# Patient Record
Sex: FEMALE | Race: WHITE | NOT HISPANIC OR LATINO | Employment: OTHER | URBAN - METROPOLITAN AREA
[De-identification: names, ages, dates, MRNs, and addresses within clinical notes are randomized per-mention and may not be internally consistent; named-entity substitution may affect disease eponyms.]

---

## 2017-01-04 ENCOUNTER — OUTPATIENT INFUSION SERVICES (OUTPATIENT)
Dept: ONCOLOGY | Facility: MEDICAL CENTER | Age: 77
End: 2017-01-04
Attending: INTERNAL MEDICINE
Payer: MEDICARE

## 2017-01-04 VITALS
DIASTOLIC BLOOD PRESSURE: 99 MMHG | OXYGEN SATURATION: 90 % | HEART RATE: 69 BPM | BODY MASS INDEX: 27.6 KG/M2 | WEIGHT: 171.74 LBS | SYSTOLIC BLOOD PRESSURE: 141 MMHG | HEIGHT: 66 IN | RESPIRATION RATE: 18 BRPM | TEMPERATURE: 97.8 F

## 2017-01-04 DIAGNOSIS — M05.742 RHEUMATOID ARTHRITIS INVOLVING BOTH HANDS WITH POSITIVE RHEUMATOID FACTOR (HCC): ICD-10-CM

## 2017-01-04 DIAGNOSIS — M05.741 RHEUMATOID ARTHRITIS INVOLVING BOTH HANDS WITH POSITIVE RHEUMATOID FACTOR (HCC): ICD-10-CM

## 2017-01-04 LAB
ALBUMIN SERPL BCP-MCNC: 4 G/DL (ref 3.2–4.9)
ALBUMIN/GLOB SERPL: 1.7 G/DL
ALP SERPL-CCNC: 130 U/L (ref 30–99)
ALT SERPL-CCNC: 26 U/L (ref 2–50)
ANION GAP SERPL CALC-SCNC: 8 MMOL/L (ref 0–11.9)
AST SERPL-CCNC: 22 U/L (ref 12–45)
BASOPHILS # BLD AUTO: 0.1 % (ref 0–1.8)
BASOPHILS # BLD: 0.01 K/UL (ref 0–0.12)
BILIRUB SERPL-MCNC: 0.5 MG/DL (ref 0.1–1.5)
BUN SERPL-MCNC: 14 MG/DL (ref 8–22)
CALCIUM SERPL-MCNC: 9.3 MG/DL (ref 8.5–10.5)
CHLORIDE SERPL-SCNC: 107 MMOL/L (ref 96–112)
CO2 SERPL-SCNC: 23 MMOL/L (ref 20–33)
CREAT SERPL-MCNC: 0.59 MG/DL (ref 0.5–1.4)
EOSINOPHIL # BLD AUTO: 0.11 K/UL (ref 0–0.51)
EOSINOPHIL NFR BLD: 1.6 % (ref 0–6.9)
ERYTHROCYTE [DISTWIDTH] IN BLOOD BY AUTOMATED COUNT: 49.1 FL (ref 35.9–50)
GFR SERPL CREATININE-BSD FRML MDRD: >60 ML/MIN/1.73 M 2
GLOBULIN SER CALC-MCNC: 2.3 G/DL (ref 1.9–3.5)
GLUCOSE SERPL-MCNC: 103 MG/DL (ref 65–99)
HCT VFR BLD AUTO: 45.6 % (ref 37–47)
HGB BLD-MCNC: 14.9 G/DL (ref 12–16)
LYMPHOCYTES # BLD AUTO: 1.33 K/UL (ref 1–4.8)
LYMPHOCYTES NFR BLD: 19.3 % (ref 22–41)
MCH RBC QN AUTO: 29.7 PG (ref 27–33)
MCHC RBC AUTO-ENTMCNC: 32.7 G/DL (ref 33.6–35)
MCV RBC AUTO: 90.8 FL (ref 81.4–97.8)
MONOCYTES # BLD AUTO: 0.61 K/UL (ref 0–0.85)
MONOCYTES NFR BLD AUTO: 8.8 % (ref 0–13.4)
NEUTROPHILS # BLD AUTO: 4.84 K/UL (ref 2–7.15)
NEUTROPHILS NFR BLD: 70.2 % (ref 44–72)
PLATELET # BLD AUTO: 330 K/UL (ref 164–446)
PMV BLD AUTO: 10.2 FL (ref 9–12.9)
POTASSIUM SERPL-SCNC: 3.9 MMOL/L (ref 3.6–5.5)
PROT SERPL-MCNC: 6.3 G/DL (ref 6–8.2)
RBC # BLD AUTO: 5.02 M/UL (ref 4.2–5.4)
SODIUM SERPL-SCNC: 138 MMOL/L (ref 135–145)
WBC # BLD AUTO: 6.9 K/UL (ref 4.8–10.8)

## 2017-01-04 PROCEDURE — 85025 COMPLETE CBC W/AUTO DIFF WBC: CPT

## 2017-01-04 PROCEDURE — 96413 CHEMO IV INFUSION 1 HR: CPT

## 2017-01-04 PROCEDURE — 700102 HCHG RX REV CODE 250 W/ 637 OVERRIDE(OP): Performed by: INTERNAL MEDICINE

## 2017-01-04 PROCEDURE — 700105 HCHG RX REV CODE 258: Performed by: INTERNAL MEDICINE

## 2017-01-04 PROCEDURE — 80053 COMPREHEN METABOLIC PANEL: CPT

## 2017-01-04 PROCEDURE — 96375 TX/PRO/DX INJ NEW DRUG ADDON: CPT

## 2017-01-04 PROCEDURE — A9270 NON-COVERED ITEM OR SERVICE: HCPCS | Performed by: INTERNAL MEDICINE

## 2017-01-04 PROCEDURE — 96415 CHEMO IV INFUSION ADDL HR: CPT

## 2017-01-04 PROCEDURE — 700111 HCHG RX REV CODE 636 W/ 250 OVERRIDE (IP): Performed by: INTERNAL MEDICINE

## 2017-01-04 RX ORDER — DIPHENHYDRAMINE HCL 25 MG
25 TABLET ORAL ONCE
Status: COMPLETED | OUTPATIENT
Start: 2017-01-04 | End: 2017-01-04

## 2017-01-04 RX ORDER — METHYLPREDNISOLONE SODIUM SUCCINATE 125 MG/2ML
100 INJECTION, POWDER, LYOPHILIZED, FOR SOLUTION INTRAMUSCULAR; INTRAVENOUS ONCE
Status: COMPLETED | OUTPATIENT
Start: 2017-01-04 | End: 2017-01-04

## 2017-01-04 RX ORDER — CHLORAL HYDRATE 500 MG
1000 CAPSULE ORAL
COMMUNITY
End: 2021-12-06

## 2017-01-04 RX ORDER — FOLIC ACID 1 MG/1
1 TABLET ORAL DAILY
COMMUNITY
End: 2017-03-28 | Stop reason: SDUPTHER

## 2017-01-04 RX ORDER — ACETAMINOPHEN 325 MG/1
650 TABLET ORAL ONCE
Status: COMPLETED | OUTPATIENT
Start: 2017-01-04 | End: 2017-01-04

## 2017-01-04 RX ADMIN — ACETAMINOPHEN 650 MG: 325 TABLET, FILM COATED ORAL at 09:14

## 2017-01-04 RX ADMIN — RITUXIMAB 1000 MG: 10 INJECTION, SOLUTION INTRAVENOUS at 09:50

## 2017-01-04 RX ADMIN — DIPHENHYDRAMINE HCL 25 MG: 25 TABLET ORAL at 09:14

## 2017-01-04 RX ADMIN — METHYLPREDNISOLONE SODIUM SUCCINATE 100 MG: 125 INJECTION, POWDER, FOR SOLUTION INTRAMUSCULAR; INTRAVENOUS at 09:14

## 2017-01-04 NOTE — PROGRESS NOTES
Chemotherapy Verification - SECONDARY RN       Height = 166.4 cm  Weight = 77.9 kg  BSA = 1.9 m2       Medication: Rituxan  Dose: set dose for RA  Calculated Dose: 1000 mg                             (In mg/m2, AUC, mg/kg)       I confirm that this process was performed independently.

## 2017-01-04 NOTE — PROGRESS NOTES
"Pharmacy chemotherapy Verification:    Gaye Lizabethzachary Antoine    Protocol: Rituximab     Rituximab 1000 mg IV Days 1 and 15  [id number]  Jag HITCHCOCK, et al. N Engl J Med 2004; 350:5019-7282. Efficacy of B-Cell-Targeted Therapy with Rituximab in Patients with Rheumatoid Arthritis    Dx: RA       Allergies: Sulfa drugs      /99 mmHg  Pulse 69  Temp(Src) 36.6 °C (97.8 °F)  Resp 18  Ht 1.664 m (5' 5.5\")  Wt 77.9 kg (171 lb 11.8 oz)  BMI 28.13 kg/m2  SpO2 90%  LMP 03/01/2005    Body surface area is 1.90 meters squared.    Labs 1/4/17  ANC~ 4840 Plt = 330 k Hgb = 14.9   Labs 12/2/16  SCr = 0.71 mg/dL CrCl ~83 ml/min     LFTs/Tbili = WNL    9/28/16 Quantiferon Gold: negative  12/2/16 Hep B: negative     Drug Order   (Drug name, dose, route, IV Fluid & volume, frequency, number of doses) Cycle: 1      Previous treatment: n/a     Medication = rituximab  Base Dose= 1000mg  Calc Dose: n/a  Final Dose = 1000mg  Route = IV  Fluid & Volume =  mL  Admin Duration = per rate sheet          Fixed dose. No calulation required.  ok to treat with final dose       By my signature below, I confirm this process was performed independently with the BSA and all final chemotherapy dosing calculations congruent. I have reviewed the above chemotherapy order and that my calculation of the final dose and BSA (when applicable) corroborate those calculations of the  pharmacist. Discrepancies of 5% or greater in the written dose have been addressed and documented within the [facility] Progress notes.    Signature: Power Grajeda, AkiraD        "

## 2017-01-04 NOTE — PROGRESS NOTES
Pt arrived to IS ambulatory, here for first time in [facility] for Rituxan for RA. Pt states she has been doing Rituxan for many years both at [place] and [facility] office. IV access established. Labs drawn per protocol. Results reviewed and pt appropriate for Rituxan today. Premeds given and Rituxan infused using slow-rate titration. Pt verbalizing frustration that her Rituxan cannot be infused faster. Educated pt that for safety, IS infuses Rituxan according to the titration sheet provided by the pharmacy. Pt verbalized understanding. Pt stated she was also due for her Prolia today. Informed pt that there were no orders in place. Call placed to [facility] office and requested orders for Prolia. Per [facility] office, they would prefer to see pt there to administer the medication. Faxed lab results for pt's convenience at [facility] office so labs would not have to be redrawn. Rituxan completed without event. Line flushed clear. IV flushed and removed. Printout of next appt provided. Pt states she would prefer to get her Prolia here. Encouraged pt to discuss with her [facility] office as they would be calling her tonight. Pt discharged home under self care in no apparent distress. Returns in 2 weeks.

## 2017-01-04 NOTE — PROGRESS NOTES
Chemotherapy Verification - PRIMARY RN      Height = 166.4 cm  Weight = 77.9 kg  BSA = 1.9 m2       Medication: Rituximab  Dose: 1000 mg SET DOSE  Calculated Dose: 1000 mg SET DOSE FOR RA                             (In mg/m2, AUC, mg/kg)       I confirm this process was performed independently with the BSA and all final chemotherapy dosing calculations congruent.  Any discrepancies of 5% or greater have been addressed with the chemotherapy pharmacist. The resolution of the discrepancy has been documented in the EPIC progress notes.

## 2017-01-04 NOTE — PROGRESS NOTES
"Pharmacy Chemotherapy Verification  Patient Name: Gaye Antoine  Dx: Rheumatoid arthritis  Cycle: 1 @ [facility] (Previous treatment = Rituxan ~ 6 months ago)  Regimen and Dosing Reference  Rituximab 1000 mg IV Days 1 and 15  Q6 months  Jag HITCHCOCK, et al. N Engl J Med 2004; 350:9270-4244. Efficacy of B-Cell-Targeted Therapy with Rituximab in Patients with Rheumatoid Arthritis    Allergies: Sulfa drugs  /99 mmHg  Pulse 69  Temp(Src) 36.6 °C (97.8 °F)  Resp 18  Ht 1.664 m (5' 5.5\")  Wt 77.9 kg (171 lb 11.8 oz)  BMI 28.13 kg/m2  SpO2 90%  LMP 03/01/2005\  Body surface area is 1.90 meters squared.    Labs 1/4/17  ANC ~ 4840     Plt = 330 k    Hgb = 14.9   SCr =  0.59      CrCl ~99.7 ml/min    AST/ALT/AP/TBili = 22/26/130/0.5    Rituximab 1000 mg IV fixed dose   <5% difference, OK to treat with final dose = 1000 mg IV    Renae Hayward, PharmD      "

## 2017-01-05 ENCOUNTER — TELEPHONE (OUTPATIENT)
Dept: HEMATOLOGY ONCOLOGY | Facility: MEDICAL CENTER | Age: 77
End: 2017-01-05

## 2017-01-05 ENCOUNTER — TELEPHONE (OUTPATIENT)
Dept: RHEUMATOLOGY | Facility: PHYSICIAN GROUP | Age: 77
End: 2017-01-05

## 2017-01-05 NOTE — TELEPHONE ENCOUNTER
Pt wants to have the Prolia done on the same day as her Rituxan 1/18/2017. She gets hers done at the infusion center. Please just write an infusion order for Prolia and we will fax it over to infusion center.

## 2017-01-05 NOTE — TELEPHONE ENCOUNTER
Nutrition Services:    Pt receiving infusion for rheumatoid arthritis treatment. Pt with good appetite, BMI WNL and no c/o GI distress at this time. No nutrition intervention warranted at this time. RD available PRN.

## 2017-01-18 ENCOUNTER — OUTPATIENT INFUSION SERVICES (OUTPATIENT)
Dept: ONCOLOGY | Facility: MEDICAL CENTER | Age: 77
End: 2017-01-18
Attending: INTERNAL MEDICINE
Payer: MEDICARE

## 2017-01-18 VITALS
WEIGHT: 174.82 LBS | HEIGHT: 66 IN | OXYGEN SATURATION: 91 % | BODY MASS INDEX: 28.1 KG/M2 | HEART RATE: 75 BPM | TEMPERATURE: 98.6 F | SYSTOLIC BLOOD PRESSURE: 137 MMHG | DIASTOLIC BLOOD PRESSURE: 90 MMHG | RESPIRATION RATE: 18 BRPM

## 2017-01-18 LAB
CA-I BLD ISE-SCNC: 1.14 MMOL/L (ref 1.1–1.3)
CREAT BLD-MCNC: 0.6 MG/DL (ref 0.5–1.4)

## 2017-01-18 PROCEDURE — 82330 ASSAY OF CALCIUM: CPT

## 2017-01-18 PROCEDURE — 700105 HCHG RX REV CODE 258: Performed by: INTERNAL MEDICINE

## 2017-01-18 PROCEDURE — 700111 HCHG RX REV CODE 636 W/ 250 OVERRIDE (IP): Performed by: INTERNAL MEDICINE

## 2017-01-18 PROCEDURE — 82565 ASSAY OF CREATININE: CPT

## 2017-01-18 PROCEDURE — 700102 HCHG RX REV CODE 250 W/ 637 OVERRIDE(OP): Performed by: INTERNAL MEDICINE

## 2017-01-18 PROCEDURE — 96415 CHEMO IV INFUSION ADDL HR: CPT

## 2017-01-18 PROCEDURE — 96375 TX/PRO/DX INJ NEW DRUG ADDON: CPT

## 2017-01-18 PROCEDURE — A9270 NON-COVERED ITEM OR SERVICE: HCPCS | Performed by: INTERNAL MEDICINE

## 2017-01-18 PROCEDURE — 96413 CHEMO IV INFUSION 1 HR: CPT

## 2017-01-18 RX ORDER — DIPHENHYDRAMINE HCL 25 MG
25 TABLET ORAL ONCE
Status: COMPLETED | OUTPATIENT
Start: 2017-01-18 | End: 2017-01-18

## 2017-01-18 RX ORDER — METHYLPREDNISOLONE SODIUM SUCCINATE 125 MG/2ML
100 INJECTION, POWDER, LYOPHILIZED, FOR SOLUTION INTRAMUSCULAR; INTRAVENOUS ONCE
Status: COMPLETED | OUTPATIENT
Start: 2017-01-18 | End: 2017-01-18

## 2017-01-18 RX ORDER — ACETAMINOPHEN 325 MG/1
650 TABLET ORAL ONCE
Status: COMPLETED | OUTPATIENT
Start: 2017-01-18 | End: 2017-01-18

## 2017-01-18 RX ADMIN — DIPHENHYDRAMINE HCL 25 MG: 25 TABLET ORAL at 09:16

## 2017-01-18 RX ADMIN — RITUXIMAB 1000 MG: 10 INJECTION, SOLUTION INTRAVENOUS at 09:35

## 2017-01-18 RX ADMIN — METHYLPREDNISOLONE SODIUM SUCCINATE 100 MG: 125 INJECTION, POWDER, FOR SOLUTION INTRAMUSCULAR; INTRAVENOUS at 09:16

## 2017-01-18 RX ADMIN — DENOSUMAB 60 MG: 60 INJECTION SUBCUTANEOUS at 13:01

## 2017-01-18 RX ADMIN — ACETAMINOPHEN 650 MG: 325 TABLET, FILM COATED ORAL at 09:16

## 2017-01-18 NOTE — PROGRESS NOTES
"Pharmacy chemotherapy Verification:    Gaye Merazley    Protocol: Rituximab     Rituximab 1000 mg IV Days 1 and 15  [id number]  Jag HITCHCOCK, et al. N Engl J Med 2004; 350:4215-0318. Efficacy of B-Cell-Targeted Therapy with Rituximab in Patients with Rheumatoid Arthritis    Dx: RA       Allergies: Sulfa drugs      /90 mmHg  Pulse 75  Temp(Src) 37 °C (98.6 °F)  Resp 18  Ht 1.664 m (5' 5.51\")  Wt 79.3 kg (174 lb 13.2 oz)  BMI 28.64 kg/m2  SpO2 91%  LMP 03/01/2005    Body surface area is 1.91 meters squared.    Labs 1/4/17  ANC~ 4840 Plt = 330 k Hgb = 14.9   SCr = 0.59 mg/dL CrCl ~80 ml/min     LFTs/Tbili = WNL    9/28/16 Quantiferon Gold: negative  12/2/16 Hep B: negative     Drug Order   (Drug name, dose, route, IV Fluid & volume, frequency, number of doses) Cycle:       Previous treatment: 1/4/17     Medication = Rituximab  Base Dose= 1000mg  Calc Dose: n/a  Final Dose = 1000mg  Route = IV  Fluid & Volume =  mL  Admin Duration = per rate sheet          Fixed dose. No calulation required.  ok to treat with final dose       By my signature below, I confirm this process was performed independently with the BSA and all final chemotherapy dosing calculations congruent. I have reviewed the above chemotherapy order and that my calculation of the final dose and BSA (when applicable) corroborate those calculations of the  pharmacist. Discrepancies of 5% or greater in the written dose have been addressed and documented within the [facility] Progress notes.    Signature: Power Grajeda, PharmD        "

## 2017-01-18 NOTE — PROGRESS NOTES
Chemotherapy Verification - SECONDARY RN       Height = 166.4 cm  Weight = 79.3 kg  BSA = 1.91 m2       Medication: Rituxan  Dose: 1,000 mg set dose  Calculated Dose: 1,000 mg set dose                             (In mg/m2, AUC, mg/kg)       I confirm that this process was performed independently.

## 2017-01-18 NOTE — PROGRESS NOTES
Chemotherapy Verification - PRIMARY RN      Height = 166.4 cm  Weight = 79.3 kg  BSA = 1.91 m2       Medication: Rituxan  Dose: 1000 mg (set dose for RA)  Calculated Dose: 1000 mg                             (In mg/m2, AUC, mg/kg)       I confirm this process was performed independently with the BSA and all final chemotherapy dosing calculations congruent.  Any discrepancies of 5% or greater have been addressed with the chemotherapy pharmacist. The resolution of the discrepancy has been documented in the EPIC progress notes.

## 2017-01-18 NOTE — PROGRESS NOTES
Pt arrived to IS, ambulatory, for D15 Rituxan for RA. Pt states that she is also supposed to receive prolia today. Prolia orders obtained. 24g PIV established in the L-FA, positive blood return noted. Pre-medications given with no s/sx of adverse reaction. Rituxan infused with no s/sx of adverse reaction. Prolia injected into the L-upper arm with no s/sx of adverse reaction. Pt left IS with no s/sx of distress. Follow up appointment not scheduled at this time, pt to see MD first.

## 2017-01-18 NOTE — PROGRESS NOTES
"Pharmacy Chemotherapy Verification    Patient Name: Gaye Antoine  Dx: Rheumatoid arthritis  Cycle: 2  Previous treatment = 1/4/17    Regimen and Dosing Reference  Rituximab 1000 mg IV Days 1 and 15  Q6 months  Jag HITCHCOCK, et al. N Engl J Med 2004; 350:2609-1024. Efficacy of B-Cell-Targeted Therapy with Rituximab in Patients with Rheumatoid Arthritis    Allergies: Sulfa drugs  /90 mmHg  Pulse 75  Temp(Src) 37 °C (98.6 °F)  Resp 18  Ht 1.664 m (5' 5.51\")  Wt 79.3 kg (174 lb 13.2 oz)  BMI 28.64 kg/m2  SpO2 91%  LMP 03/01/2005\  Body surface area is 1.91 meters squared.    Labs 1/4/17- no new labs required for D15  ANC ~ 4840     Plt = 330 k    Hgb = 14.9    Calcium 9.3  SCr =  0.59      CrCl ~99.7 ml/min    AST/ALT/AP/TBili = 22/26/130/0.5    Rituximab 1000 mg IV fixed dose   No calculation required   OK to treat with final dose = 1000 mg IV    OK for prolia today as ordered     JENNIFER Sewell, Pharm.D.        "

## 2017-03-21 ENCOUNTER — HOSPITAL ENCOUNTER (OUTPATIENT)
Dept: LAB | Facility: MEDICAL CENTER | Age: 77
End: 2017-03-21
Attending: INTERNAL MEDICINE
Payer: MEDICARE

## 2017-03-21 DIAGNOSIS — M05.741 RHEUMATOID ARTHRITIS INVOLVING BOTH HANDS WITH POSITIVE RHEUMATOID FACTOR (HCC): ICD-10-CM

## 2017-03-21 DIAGNOSIS — M05.742 RHEUMATOID ARTHRITIS INVOLVING BOTH HANDS WITH POSITIVE RHEUMATOID FACTOR (HCC): ICD-10-CM

## 2017-03-21 LAB
ALBUMIN SERPL BCP-MCNC: 4.1 G/DL (ref 3.2–4.9)
ALP SERPL-CCNC: 82 U/L (ref 30–99)
ALT SERPL-CCNC: 24 U/L (ref 2–50)
AST SERPL-CCNC: 18 U/L (ref 12–45)
BASOPHILS # BLD AUTO: 0.02 K/UL (ref 0–0.12)
BASOPHILS NFR BLD AUTO: 0.3 % (ref 0–1.8)
BILIRUB CONJ SERPL-MCNC: <0.1 MG/DL (ref 0.1–0.5)
BILIRUB INDIRECT SERPL-MCNC: NORMAL MG/DL (ref 0–1)
BILIRUB SERPL-MCNC: 0.6 MG/DL (ref 0.1–1.5)
BUN SERPL-MCNC: 15 MG/DL (ref 8–22)
CREAT SERPL-MCNC: 0.74 MG/DL (ref 0.5–1.4)
CRP SERPL HS-MCNC: 0.7 MG/DL (ref 0–0.75)
EOSINOPHIL # BLD: 0.04 K/UL (ref 0–0.51)
EOSINOPHIL NFR BLD AUTO: 0.5 % (ref 0–6.9)
ERYTHROCYTE [DISTWIDTH] IN BLOOD BY AUTOMATED COUNT: 56.7 FL (ref 35.9–50)
ERYTHROCYTE [SEDIMENTATION RATE] IN BLOOD BY WESTERGREN METHOD: 16 MM/HOUR (ref 0–30)
HCT VFR BLD AUTO: 48.4 % (ref 37–47)
HGB BLD-MCNC: 15.4 G/DL (ref 12–16)
IMM GRANULOCYTES # BLD AUTO: 0.05 K/UL (ref 0–0.11)
IMM GRANULOCYTES NFR BLD AUTO: 0.7 % (ref 0–0.9)
LYMPHOCYTES # BLD: 1.52 K/UL (ref 1–4.8)
LYMPHOCYTES NFR BLD AUTO: 19.8 % (ref 22–41)
MCH RBC QN AUTO: 29.4 PG (ref 27–33)
MCHC RBC AUTO-ENTMCNC: 31.8 G/DL (ref 33.6–35)
MCV RBC AUTO: 92.5 FL (ref 81.4–97.8)
MONOCYTES # BLD: 0.7 K/UL (ref 0–0.85)
MONOCYTES NFR BLD AUTO: 9.1 % (ref 0–13.4)
NEUTROPHILS # BLD: 5.33 K/UL (ref 2–7.15)
NEUTROPHILS NFR BLD AUTO: 69.6 % (ref 44–72)
NRBC # BLD AUTO: 0 K/UL
NRBC BLD-RTO: 0 /100 WBC
PLATELET # BLD AUTO: 247 K/UL (ref 164–446)
PMV BLD AUTO: 10.8 FL (ref 9–12.9)
PROT SERPL-MCNC: 6.9 G/DL (ref 6–8.2)
RBC # BLD AUTO: 5.23 M/UL (ref 4.2–5.4)
WBC # BLD AUTO: 7.7 K/UL (ref 4.8–10.8)

## 2017-03-21 PROCEDURE — 36415 COLL VENOUS BLD VENIPUNCTURE: CPT

## 2017-03-21 PROCEDURE — 84520 ASSAY OF UREA NITROGEN: CPT

## 2017-03-21 PROCEDURE — 82565 ASSAY OF CREATININE: CPT

## 2017-03-21 PROCEDURE — 85025 COMPLETE CBC W/AUTO DIFF WBC: CPT

## 2017-03-21 PROCEDURE — 85652 RBC SED RATE AUTOMATED: CPT

## 2017-03-21 PROCEDURE — 86140 C-REACTIVE PROTEIN: CPT

## 2017-03-21 PROCEDURE — 80076 HEPATIC FUNCTION PANEL: CPT

## 2017-03-28 ENCOUNTER — OFFICE VISIT (OUTPATIENT)
Dept: RHEUMATOLOGY | Facility: PHYSICIAN GROUP | Age: 77
End: 2017-03-28
Payer: MEDICARE

## 2017-03-28 VITALS
TEMPERATURE: 99.1 F | DIASTOLIC BLOOD PRESSURE: 90 MMHG | HEART RATE: 68 BPM | WEIGHT: 178 LBS | SYSTOLIC BLOOD PRESSURE: 140 MMHG | BODY MASS INDEX: 29.16 KG/M2 | RESPIRATION RATE: 16 BRPM | OXYGEN SATURATION: 88 %

## 2017-03-28 DIAGNOSIS — M85.80 OSTEOPENIA: ICD-10-CM

## 2017-03-28 DIAGNOSIS — M05.742 RHEUMATOID ARTHRITIS INVOLVING BOTH HANDS WITH POSITIVE RHEUMATOID FACTOR (HCC): ICD-10-CM

## 2017-03-28 DIAGNOSIS — M05.741 RHEUMATOID ARTHRITIS INVOLVING BOTH HANDS WITH POSITIVE RHEUMATOID FACTOR (HCC): ICD-10-CM

## 2017-03-28 DIAGNOSIS — Z79.899 ENCOUNTER FOR LONG-TERM (CURRENT) USE OF HIGH-RISK MEDICATION: ICD-10-CM

## 2017-03-28 PROCEDURE — 3288F FALL RISK ASSESSMENT DOCD: CPT | Mod: 8P | Performed by: INTERNAL MEDICINE

## 2017-03-28 PROCEDURE — 99214 OFFICE O/P EST MOD 30 MIN: CPT | Performed by: INTERNAL MEDICINE

## 2017-03-28 PROCEDURE — G8419 CALC BMI OUT NRM PARAM NOF/U: HCPCS | Performed by: INTERNAL MEDICINE

## 2017-03-28 PROCEDURE — 4040F PNEUMOC VAC/ADMIN/RCVD: CPT | Performed by: INTERNAL MEDICINE

## 2017-03-28 PROCEDURE — G8432 DEP SCR NOT DOC, RNG: HCPCS | Performed by: INTERNAL MEDICINE

## 2017-03-28 PROCEDURE — 4004F PT TOBACCO SCREEN RCVD TLK: CPT | Performed by: INTERNAL MEDICINE

## 2017-03-28 PROCEDURE — G8482 FLU IMMUNIZE ORDER/ADMIN: HCPCS | Performed by: INTERNAL MEDICINE

## 2017-03-28 PROCEDURE — 0518F FALL PLAN OF CARE DOCD: CPT | Mod: 8P | Performed by: INTERNAL MEDICINE

## 2017-03-28 PROCEDURE — 1100F PTFALLS ASSESS-DOCD GE2>/YR: CPT | Performed by: INTERNAL MEDICINE

## 2017-03-28 RX ORDER — FOLIC ACID 1 MG/1
1 TABLET ORAL DAILY
Qty: 30 TAB | Refills: 11 | Status: SHIPPED | OUTPATIENT
Start: 2017-03-28 | End: 2019-01-30 | Stop reason: SDUPTHER

## 2017-03-28 ASSESSMENT — ENCOUNTER SYMPTOMS
BACK PAIN: 0
CHILLS: 0
FALLS: 0
FEVER: 0

## 2017-03-28 NOTE — PROGRESS NOTES
Subjective:   Date of Consultation:3/28/2017  1:23 PM  Primary care physician:Sada Guillory M.D.      Reason for Consultation:  Ms. Antoine  is a pleasant 76 y.o. year old female who presented with  Follow-up for Rheumatoid Arthritis    Rheumatoid Arthritis  She reports that she has been controlled since being on rituximab. She denies any joint swelling morning stiffness or flares. She is tolerating her medications well with no hair loss or mouth ulcers.    Current Medications  Methotrexate 6 tabs weekly q Wednesday  Folic acid  rituxan infusion 1/4/2017 and 1/18/2017  Prednisone 1 mg daily      RHEUM HISTORY  She was diagnosed in 1984 with presenting symptoms of burning feet.  At the time she denied rash.  EMG was not completed.  She states the burning of her feet were the only symptoms.  She tried GOLD and then prednisone.  Ms. Gaye Antoine reports she has tried several medications and only rituxan works.  She reports being on methotrexate x 30 years. She cannot recall the names of the medications    Osteopenia  On prolia  Last injection was 1/2017  DEXA 10/23/2012 showed osteopenia with FRAX scores that are elevated > 20% and > 3 % for 10 year risk of major fracture and hip fracture respectively  Taking calcium C and vitmain D ( takes 3 pills daily)        Past Medical History   Diagnosis Date   • Hypothyroidism, postablative 1986   • CVA (cerebral infarction) 2008     L hemiparesis / ? 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 ORS   • Hip cannulated screw  6/15/2009     Performed by DANIELLE LANG at SURGERY [facility] ORS   • Other  2008     submaxillary gland infection   • Appendectomy  age 7   • Cholecystectomy     • Parathyroid exploration  2/22/2012     [facility] CURTIS DUPONT [facility]   • Rhytidectomy     • Mammoplasty reduction  2006     complicated by abscess needing I&D     Allergies   Allergen Reactions   • Sulfa Drugs      Not sure     Outpatient Encounter Prescriptions as of 3/28/2017   Medication Sig Dispense Refill   • folic acid (FOLVITE) 1 MG Tab Take 1 Tab by mouth every day. 30 Tab 11   • methotrexate 2.5 MG Tab Take 6 Tabs by mouth every 7 days. 6 tabs once a week 24 Tab 2   • Calcium Citrate-Vitamin D (CALCIUM + D PO) Take  by mouth.     • Omega-3 Fatty Acids (FISH OIL) 1000 MG Cap capsule Take 1,000 mg by mouth 3 times a day, with meals.     • rosuvastatin (CRESTOR) 10 MG Tab TAKE 1 TABLET BY MOUTH DAILY 90 Tab 2   • SYNTHROID 175 MCG Tab Take 1 Tab by mouth Every morning on an empty stomach. Synthroid brand only 90 Tab 2   • citalopram (CELEXA) 10 MG tablet Take 1 Tab by mouth every day. 90 Tab 2   • PREDNISONE 1 MG PO TABS Take 1 mg by mouth every day.     • [DISCONTINUED] folic acid (FOLVITE) 1 MG Tab Take 1 mg by mouth every day.     • Denosumab (PROLIA SC) Inject  as instructed every 6 months.     • riTUXimab (RITUXAN) 10 MG/ML CONC 1,000 mg by Intravenous route.         No facility-administered encounter medications on file as of 3/28/2017.     Social History     Social History   • Marital Status:      Spouse Name: N/A   • Number of Children: 1   • Years of Education: some colle     Occupational History   • Ret [employer]      Social History Main Topics   • Smoking status: Current Some Day  Smoker -- 0.50 packs/day for 50 years     Types: Cigarettes   • Smokeless tobacco: Never Used      Comment: Previous heavy 1 ppd smoker.    • Alcohol Use: 0.0 oz/week     0 Standard drinks or equivalent per week      Comment: rare   • Drug Use: No   • Sexual Activity: Not Currently      Comment:       Other Topics Concern   • Not on file     Social History Narrative    Retired  for [employer].         49 yo son.    No grandchildren.    Sister with PCKD may have been 1/2 sister (family secret)      History   Smoking status   • Current Some Day Smoker -- 0.50 packs/day for 50 years   • Types: Cigarettes   Smokeless tobacco   • Never Used     Comment: Previous heavy 1 ppd smoker.      History   Alcohol Use   • 0.0 oz/week   • 0 Standard drinks or equivalent per week     Comment: rare     History   Drug Use No      OB History    Para Term  AB SAB TAB Ectopic Multiple Living   1 1              # Outcome Date GA Lbr Khanh/2nd Weight Sex Delivery Anes PTL Lv   1 Para                  Patient's last menstrual period was 2005.    G P A L     Family History   Problem Relation Age of Onset   • Genitourinary () Sister      polycystic kidneys   • Cancer Mother      breast   • Lung Disease Mother      TB   • Heart Disease Father      aneurysm       Review of Systems   Constitutional: Negative for fever and chills.   Musculoskeletal: Negative for back pain, joint pain and falls.        Objective:   /90 mmHg  Pulse 68  Temp(Src) 37.3 °C (99.1 °F)  Resp 16  Wt 80.74 kg (178 lb)  SpO2 88%  LMP 2005  Breastfeeding? No    Physical Exam   Constitutional: She is oriented to person, place, and time. She appears well-developed and well-nourished. No distress.   HENT:   Head: Normocephalic and atraumatic.   Right Ear: External ear normal.   Left Ear: External ear normal.   Eyes: Conjunctivae are normal. Right eye exhibits no discharge. Left eye exhibits no  discharge. No scleral icterus.   Pulmonary/Chest: No stridor. No respiratory distress.   Abdominal: Soft. She exhibits no distension.   No hepatosplenomegaly   Musculoskeletal: She exhibits no edema.   Left ankle swelling which she states is chronic. No synovitis appreciated in MCPs, PIPs or DIPs. No synovitis she and the wrists bilateral.   Neurological: She is alert and oriented to person, place, and time.   Skin: She is not diaphoretic.   Over the area of the right press there is an mild area of erythema with no fluctuation or induration appreciated   Psychiatric: She has a normal mood and affect. Her behavior is normal. Judgment and thought content normal.       Assessment:     1. Encounter for long-term (current) use of high-risk medication  folic acid (FOLVITE) 1 MG Tab   2. Rheumatoid arthritis involving both hands with positive rheumatoid factor (CMS-HCC)     3. Osteopenia       Labs:  Scooby last injection  Lab Results   Component Value Date/Time    QUANTIFERON TB GOLD Negative 09/28/2016 12:19 PM     Lab Results   Component Value Date/Time    HEPATITIS B CCORE AB, TOTAL Negative 12/02/2016 01:48 PM    HEPATITIS B SURFACE ANTIGEN Negative 12/02/2016 01:48 PM     Lab Results   Component Value Date/Time    HEPATITIS C ANTIBODY Negative 12/02/2016 01:48 PM     Lab Results   Component Value Date/Time    SODIUM 138 01/04/2017 08:48 AM    POTASSIUM 3.9 01/04/2017 08:48 AM    CHLORIDE 107 01/04/2017 08:48 AM    CO2 23 01/04/2017 08:48 AM    GLUCOSE 103* 01/04/2017 08:48 AM    BUN 15 03/21/2017 01:06 PM    CREATININE 0.74 03/21/2017 01:06 PM      Lab Results   Component Value Date/Time    WBC 7.7 03/21/2017 01:06 PM    RBC 5.23 03/21/2017 01:06 PM    HEMOGLOBIN 15.4 03/21/2017 01:06 PM    HEMATOCRIT 48.4* 03/21/2017 01:06 PM    MCV 92.5 03/21/2017 01:06 PM    MCH 29.4 03/21/2017 01:06 PM    MCHC 31.8* 03/21/2017 01:06 PM    MPV 10.8 03/21/2017 01:06 PM    NEUTROPHILS-POLYS 69.60 03/21/2017 01:06 PM    LYMPHOCYTES  19.80* 03/21/2017 01:06 PM    MONOCYTES 9.10 03/21/2017 01:06 PM    EOSINOPHILS 0.50 03/21/2017 01:06 PM    BASOPHILS 0.30 03/21/2017 01:06 PM    HYPOCHROMIA 1+ 05/09/2014 12:01 PM    ANISOCYTOSIS 1+ 06/10/2008 05:00 AM      Lab Results   Component Value Date/Time    CALCIUM 9.3 01/04/2017 08:48 AM    AST(SGOT) 18 03/21/2017 01:06 PM    ALT(SGPT) 24 03/21/2017 01:06 PM    ALKALINE PHOSPHATASE 82 03/21/2017 01:06 PM    TOTAL BILIRUBIN 0.6 03/21/2017 01:06 PM    ALBUMIN 4.1 03/21/2017 01:06 PM    TOTAL PROTEIN 6.9 03/21/2017 01:06 PM     Lab Results   Component Value Date/Time    RHEUMATOID FACTOR -NEPH- 46804.0* 05/08/2006 01:28 PM    CCP ANTIBODIES 106* 09/28/2016 12:19 PM    ANTINUCLEAR ANTIBODY NONE DET 06/12/2008 06:15 AM     Lab Results   Component Value Date/Time    SED RATE WESTERGREN 16 03/21/2017 01:06 PM    STAT C-REACTIVE PROTEIN 0.70 03/21/2017 01:06 PM     No results found for: RUSSELVIPER, DRVVTINTERP  No results found for: T0DMWHEHBJB, Z7BZOXGEOCZ, IGGCARDIOLI, IGMCARDIOLI, IGACARDIOLI, CRYOGLOBULIN  Lab Results   Component Value Date/Time    ANTI-DNA -DS None Detected 09/28/2016 12:19 PM    RNP ANTIBODIES 0 09/28/2016 12:19 PM    SMITH ANTIBODIES 0 09/28/2016 12:19 PM    ANTI-SCL-70 0 09/28/2016 12:19 PM    ANTI-CENTROMERE B AB 2 09/28/2016 12:19 PM     Lab Results   Component Value Date/Time    ANTI-DNA -DS None Detected 09/28/2016 12:19 PM    RNP ANTIBODIES 0 09/28/2016 12:19 PM    SJOGREN'S ANTI-SS-B 0 09/28/2016 12:19 PM     Lab Results   Component Value Date/Time    COLOR Lt. Yellow 05/19/2015 02:30 PM    SPECIFIC GRAVITY 1.015 05/19/2015 02:30 PM    PH 5.5 05/19/2015 02:30 PM    GLUCOSE Negative 05/19/2015 02:30 PM    KETONES Negative 05/19/2015 02:30 PM    PROTEIN Negative 05/19/2015 02:30 PM     Lab Results   Component Value Date/Time    TOTAL VOLUME 1500 12/21/2011 12:00 AM     Lab Results   Component Value Date/Time    SSA 60 (R0)(DENILSON) AB, IGG 1 09/28/2016 12:19 PM    SSA 52 (R0)(DENILSON) AB,  IGG 4 09/28/2016 12:19 PM     Lab Results   Component Value Date/Time    GLYCOHEMOGLOBIN 5.5 05/09/2014 11:59 AM     Lab Results   Component Value Date/Time    CPK TOTAL 69 09/28/2016 12:19 PM     No results found for: G6PD  No results found for: [id number]  No results found for: ACESERUM  Lab Results   Component Value Date/Time    25-HYDROXY   VITAMIN D 25 25* 05/09/2014 11:59 AM     No results found for: TSH, FREEDIR  Lab Results   Component Value Date/Time    TSH 1.630 03/11/2016 03:07 PM    FREE T-4 1.60* 03/11/2016 03:07 PM     No results found for: MICROSOMALA, ANTITHYROGL  No results found for: IGGLYMEABS  No results found for: ANTIMITOCHO, FACTIN  No results found for: IGA, TTRANSIGA, ENDOIGA  No results found for: FLTYPE, CRYSTALSBDF  Lab Results   Component Value Date/Time    ISTAT IONIZED CALCIUM 1.14 01/18/2017 09:14 AM     Lab Results   Component Value Date/Time    ISTAT CREATININE 0.6 01/18/2017 09:14 AM     No results found for: CTELOPEP  No results found for: GBMABG  Lab Results   Component Value Date/Time    PTH, INTACT 24.6 08/06/2013 04:16 PM         Medical Decision Making:  Today's Assessment / Status / Plan:     Rheumatoid Arthritis  *Controlled on methotrexate, rituximab, and prednisone 1 mg. We will continue this regimen. I did discuss with the patient today the side effects of methotrexate including but not limited to hepatotoxicity and bone marrow suppression. I also discussed the importance of close monitoring of her labs every 3 months.    Osteopenia  Continue probably injections.  Consider DEXA next visit    1. Encounter for long-term (current) use of high-risk medication  folic acid (FOLVITE) 1 MG Tab   2. Rheumatoid arthritis involving both hands with positive rheumatoid factor (CMS-HCC)     3. Osteopenia         Return in about 3 months (around 6/28/2017).      I have spent greater than 50% of this 30 minute visit in counseling/coordination of care with the patient regarding her  symptoms as well as importance of close drug monitoring with regards to her methotrexate as well as the side effects.    She agreed and verbalized her agreement and understanding with the current plan. I answered all questions and concerns she has at this time and advised her to call at any time in there interim with questions or concerns in regards to her care.    Thank you for allowing me to participate in her care, I will continue to follow closely.     Please note that this dictation was created using voice recognition software. I have made every reasonable attempt to correct obvious errors, but I expect that there are errors of grammar and possibly content that I did not discover before finalizing the note.

## 2017-03-28 NOTE — Clinical Note
[facility]   [address], [address]  [place] [address]  Phone: [phone]  Fax: [phone]              Encounter Date: 3/28/2017    Dear Dr. Guillory,    It was a pleasure seeing your patient, Gaye Antoine, on 3/28/2017. Diagnoses of Encounter for long-term (current) use of high-risk medication, Rheumatoid arthritis involving both hands with positive rheumatoid factor (CMS-[facility]), and Osteopenia were pertinent to this visit.     Please find attached progress note which includes the history I obtained from Ms. Antoine, my physical examination findings, my impression and recommendations.      Once again, it was a pleasure participating in your patient's care.  Please feel free to contact me if you have any questions or if I can be of any further assistance to your patients.      Sincerely,    Sophie Islas M.D.  Electronically Signed          PROGRESS NOTE:  Subjective:   Date of Consultation:3/28/2017  1:23 PM  Primary care physician:Sada Guillory M.D.      Reason for Consultation:  Ms. Antoine  is a pleasant 76 y.o. year old female who presented with  Follow-up for Rheumatoid Arthritis    Rheumatoid Arthritis  She reports that she has been controlled since being on rituximab. She denies any joint swelling morning stiffness or flares. She is tolerating her medications well with no hair loss or mouth ulcers.    Current Medications  Methotrexate 6 tabs weekly q Wednesday  Folic acid  rituxan infusion 1/4/2017 and 1/18/2017  Prednisone 1 mg daily      RHEUM HISTORY  She was diagnosed in 1984 with presenting symptoms of burning feet.  At the time she denied rash.  EMG was not completed.  She states the burning of her feet were the only symptoms.  She tried GOLD and then prednisone.  Ms. Gaye Antoine reports she has tried several medications and only rituxan works.  She reports being on methotrexate x 30 years. She cannot recall the names of the medications    Osteopenia  On  prolia  Last injection was 1/2017  DEXA 10/23/2012 showed osteopenia with FRAX scores that are elevated > 20% and > 3 % for 10 year risk of major fracture and hip fracture respectively  Taking calcium C and vitmain D ( takes 3 pills daily)        Past Medical History   Diagnosis Date   • Hypothyroidism, postablative 1986   • CVA (cerebral infarction) 2008     L hemiparesis / ? 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, late effect of cerebrovascular disease (CMS-HCC) 1/19/2013   • Chronic constipation      Past Surgical History   Procedure Laterality Date   • Ankle fusion  1/8/2009     Performed by WM GONSALO HERRERA at SURGERY [facility]S   • Hip cannulated screw  6/15/2009     Performed by DANIELLE LANG at SURGERY [facility]S   • Other  2008     submaxillary gland infection   • Appendectomy  age 7   • Cholecystectomy     • Parathyroid exploration  2/22/2012     Performed by CURTIS DUPONT at SURGERY SAME DAY [facility] ORS   • Rhytidectomy     • Mammoplasty reduction  2006     complicated by abscess needing I&D     Allergies   Allergen Reactions   • Sulfa Drugs      Not sure     Outpatient Encounter Prescriptions as of 3/28/2017   Medication Sig Dispense Refill   • folic acid (FOLVITE) 1 MG Tab Take 1 Tab by mouth every day. 30 Tab 11   • methotrexate 2.5 MG Tab Take 6 Tabs by mouth every 7 days. 6 tabs once a week 24 Tab 2   • Calcium Citrate-Vitamin D (CALCIUM + D PO) Take  by mouth.     • Omega-3 Fatty Acids (FISH OIL) 1000 MG Cap capsule Take 1,000 mg by mouth 3 times a day, with meals.      • rosuvastatin (CRESTOR) 10 MG Tab TAKE 1 TABLET BY MOUTH DAILY 90 Tab 2   • SYNTHROID 175 MCG Tab Take 1 Tab by mouth Every morning on an empty stomach. Synthroid brand only 90 Tab 2   • citalopram (CELEXA) 10 MG tablet Take 1 Tab by mouth every day. 90 Tab 2   • PREDNISONE 1 MG PO TABS Take 1 mg by mouth every day.     • [DISCONTINUED] folic acid (FOLVITE) 1 MG Tab Take 1 mg by mouth every day.     • Denosumab (PROLIA SC) Inject  as instructed every 6 months.     • riTUXimab (RITUXAN) 10 MG/ML CONC 1,000 mg by Intravenous route.         No facility-administered encounter medications on file as of 3/28/2017.     Social History     Social History   • Marital Status:      Spouse Name: N/A   • Number of Children: 1   • Years of Education: some colle     Occupational History   • [employer]      Social History Main Topics   • Smoking status: Current Some Day Smoker -- 0.50 packs/day for 50 years     Types: Cigarettes   • Smokeless tobacco: Never Used      Comment: Previous heavy 1 ppd smoker.    • Alcohol Use: 0.0 oz/week     0 Standard drinks or equivalent per week      Comment: rare   • Drug Use: No   • Sexual Activity: Not Currently      Comment:       Other Topics Concern   • Not on file     Social History Narrative    Retired  for [employer].         49 yo son.    No grandchildren.    Sister with PCKD may have been 1/2 sister (family secret)      History   Smoking status   • Current Some Day Smoker -- 0.50 packs/day for 50 years   • Types: Cigarettes   Smokeless tobacco   • Never Used     Comment: Previous heavy 1 ppd smoker.      History   Alcohol Use   • 0.0 oz/week   • 0 Standard drinks or equivalent per week     Comment: rare     History   Drug Use No      OB History    Para Term  AB SAB TAB Ectopic Multiple Living   1 1              # Outcome Date GA Lbr Khanh/2nd Weight Sex Delivery Anes PTL Lv   1 Para                     Patient's last menstrual period was 03/01/2005.    NIYA P A DARION     Family History   Problem Relation Age of Onset   • Genitourinary () Sister      polycystic kidneys   • Cancer Mother      breast   • Lung Disease Mother      TB   • Heart Disease Father      aneurysm       Review of Systems   Constitutional: Negative for fever and chills.   Musculoskeletal: Negative for back pain, joint pain and falls.        Objective:   /90 mmHg  Pulse 68  Temp(Src) 37.3 °C (99.1 °F)  Resp 16  Wt 80.74 kg (178 lb)  SpO2 88%  LMP 03/01/2005  Breastfeeding? No    Physical Exam   Constitutional: She is oriented to person, place, and time. She appears well-developed and well-nourished. No distress.   HENT:   Head: Normocephalic and atraumatic.   Right Ear: External ear normal.   Left Ear: External ear normal.   Eyes: Conjunctivae are normal. Right eye exhibits no discharge. Left eye exhibits no discharge. No scleral icterus.   Pulmonary/Chest: No stridor. No respiratory distress.   Abdominal: Soft. She exhibits no distension.   No hepatosplenomegaly   Musculoskeletal: She exhibits no edema.   Left ankle swelling which she states is chronic. No synovitis appreciated in MCPs, PIPs or DIPs. No synovitis she and the wrists bilateral.   Neurological: She is alert and oriented to person, place, and time.   Skin: She is not diaphoretic.   Over the area of the right press there is an mild area of erythema with no fluctuation or induration appreciated   Psychiatric: She has a normal mood and affect. Her behavior is normal. Judgment and thought content normal.       Assessment:     1. Encounter for long-term (current) use of high-risk medication  folic acid (FOLVITE) 1 MG Tab   2. Rheumatoid arthritis involving both hands with positive rheumatoid factor (CMS-HCC)     3. Osteopenia       Labs:  Scooby last injection  Lab Results   Component Value Date/Time    QUANTIFERON TB GOLD Negative 09/28/2016 12:19 PM     Lab Results   Component  Value Date/Time    HEPATITIS B CCORE AB, TOTAL Negative 12/02/2016 01:48 PM    HEPATITIS B SURFACE ANTIGEN Negative 12/02/2016 01:48 PM     Lab Results   Component Value Date/Time    HEPATITIS C ANTIBODY Negative 12/02/2016 01:48 PM     Lab Results   Component Value Date/Time    SODIUM 138 01/04/2017 08:48 AM    POTASSIUM 3.9 01/04/2017 08:48 AM    CHLORIDE 107 01/04/2017 08:48 AM    CO2 23 01/04/2017 08:48 AM    GLUCOSE 103* 01/04/2017 08:48 AM    BUN 15 03/21/2017 01:06 PM    CREATININE 0.74 03/21/2017 01:06 PM      Lab Results   Component Value Date/Time    WBC 7.7 03/21/2017 01:06 PM    RBC 5.23 03/21/2017 01:06 PM    HEMOGLOBIN 15.4 03/21/2017 01:06 PM    HEMATOCRIT 48.4* 03/21/2017 01:06 PM    MCV 92.5 03/21/2017 01:06 PM    MCH 29.4 03/21/2017 01:06 PM    MCHC 31.8* 03/21/2017 01:06 PM    MPV 10.8 03/21/2017 01:06 PM    NEUTROPHILS-POLYS 69.60 03/21/2017 01:06 PM    LYMPHOCYTES 19.80* 03/21/2017 01:06 PM    MONOCYTES 9.10 03/21/2017 01:06 PM    EOSINOPHILS 0.50 03/21/2017 01:06 PM    BASOPHILS 0.30 03/21/2017 01:06 PM    HYPOCHROMIA 1+ 05/09/2014 12:01 PM    ANISOCYTOSIS 1+ 06/10/2008 05:00 AM      Lab Results   Component Value Date/Time    CALCIUM 9.3 01/04/2017 08:48 AM    AST(SGOT) 18 03/21/2017 01:06 PM    ALT(SGPT) 24 03/21/2017 01:06 PM    ALKALINE PHOSPHATASE 82 03/21/2017 01:06 PM    TOTAL BILIRUBIN 0.6 03/21/2017 01:06 PM    ALBUMIN 4.1 03/21/2017 01:06 PM    TOTAL PROTEIN 6.9 03/21/2017 01:06 PM     Lab Results   Component Value Date/Time    RHEUMATOID FACTOR -NEPH- 64986.0* 05/08/2006 01:28 PM    CCP ANTIBODIES 106* 09/28/2016 12:19 PM    ANTINUCLEAR ANTIBODY NONE DET 06/12/2008 06:15 AM     Lab Results   Component Value Date/Time    SED RATE ALEJANDRA 16 03/21/2017 01:06 PM    STAT C-REACTIVE PROTEIN 0.70 03/21/2017 01:06 PM     No results found for: RUSSELVIPER, DRVVTINTERP  No results found for: U7SCKYMOSML, N6VMYTKYLAR, IGGCARDIOLI, IGMCARDIOLI, IGACARDIOLI, CRYOGLOBULIN  Lab Results      Component Value Date/Time    ANTI-DNA -DS None Detected 09/28/2016 12:19 PM    RNP ANTIBODIES 0 09/28/2016 12:19 PM    SMITH ANTIBODIES 0 09/28/2016 12:19 PM    ANTI-SCL-70 0 09/28/2016 12:19 PM    ANTI-CENTROMERE B AB 2 09/28/2016 12:19 PM     Lab Results   Component Value Date/Time    ANTI-DNA -DS None Detected 09/28/2016 12:19 PM    RNP ANTIBODIES 0 09/28/2016 12:19 PM    SJOGREN'S ANTI-SS-B 0 09/28/2016 12:19 PM     Lab Results   Component Value Date/Time    COLOR Lt. Yellow 05/19/2015 02:30 PM    SPECIFIC GRAVITY 1.015 05/19/2015 02:30 PM    PH 5.5 05/19/2015 02:30 PM    GLUCOSE Negative 05/19/2015 02:30 PM    KETONES Negative 05/19/2015 02:30 PM    PROTEIN Negative 05/19/2015 02:30 PM     Lab Results   Component Value Date/Time    TOTAL VOLUME 1500 12/21/2011 12:00 AM     Lab Results   Component Value Date/Time    SSA 60 (R0)(DENILSON) AB, IGG 1 09/28/2016 12:19 PM    SSA 52 (R0)(DENILSON) AB, IGG 4 09/28/2016 12:19 PM     Lab Results   Component Value Date/Time    GLYCOHEMOGLOBIN 5.5 05/09/2014 11:59 AM     Lab Results   Component Value Date/Time    CPK TOTAL 69 09/28/2016 12:19 PM     No results found for: G6PD  No results found for: CLSA09AUYW  No results found for: ACESERUM  Lab Results   Component Value Date/Time    25-HYDROXY   VITAMIN D 25 25* 05/09/2014 11:59 AM     No results found for: TSH, FREEDIR  Lab Results   Component Value Date/Time    TSH 1.630 03/11/2016 03:07 PM    FREE T-4 1.60* 03/11/2016 03:07 PM     No results found for: MICROSOMALA, ANTITHYROGL  No results found for: IGGLYMEABS  No results found for: ANTIMITOCHO, FACTIN  No results found for: IGA, TTRANSIGA, ENDOIGA  No results found for: FLTYPE, CRYSTALSBDF  Lab Results   Component Value Date/Time    ISTAT IONIZED CALCIUM 1.14 01/18/2017 09:14 AM     Lab Results   Component Value Date/Time    ISTAT CREATININE 0.6 01/18/2017 09:14 AM     No results found for: CTELOPEP  No results found for: GBMABG  Lab Results   Component Value Date/Time     PTH, INTACT 24.6 08/06/2013 04:16 PM         Medical Decision Making:  Today's Assessment / Status / Plan:     Rheumatoid Arthritis  *Controlled on methotrexate, rituximab, and prednisone 1 mg. We will continue this regimen. I did discuss with the patient today the side effects of methotrexate including but not limited to hepatotoxicity and bone marrow suppression. I also discussed the importance of close monitoring of her labs every 3 months.    Osteopenia  Continue probably injections.  Consider DEXA next visit    1. Encounter for long-term (current) use of high-risk medication  folic acid (FOLVITE) 1 MG Tab   2. Rheumatoid arthritis involving both hands with positive rheumatoid factor (CMS-[facility])     3. Osteopenia         Return in about 3 months (around 6/28/2017).      I have spent greater than 50% of this 30 minute visit in counseling/coordination of care with the patient regarding her symptoms as well as importance of close drug monitoring with regards to her methotrexate as well as the side effects.    She agreed and verbalized her agreement and understanding with the current plan. I answered all questions and concerns she has at this time and advised her to call at any time in there interim with questions or concerns in regards to her care.    Thank you for allowing me to participate in her care, I will continue to follow closely.     Please note that this dictation was created using voice recognition software. I have made every reasonable attempt to correct obvious errors, but I expect that there are errors of grammar and possibly content that I did not discover before finalizing the note.

## 2017-03-28 NOTE — MR AVS SNAPSHOT
Gaye Antoine   3/28/2017 1:30 PM   Office Visit   MRN: [id number]    Department:  [facility]   Dept Phone:  [phone]    Description:  Female : 1940   Provider:  Sophie Islas M.D.           Reason for Visit     Follow-Up follow up      Allergies as of 3/28/2017     Allergen Noted Reactions    Sulfa Drugs 2008       Not sure      You were diagnosed with     Encounter for long-term (current) use of high-risk medication   [[id number]]         Vital Signs     Blood Pressure Pulse Temperature Respirations Weight Oxygen Saturation    140/90 mmHg 68 37.3 °C (99.1 °F) 16 80.74 kg (178 lb) 88%    Last Menstrual Period Breastfeeding? Smoking Status             2005 No Current Some Day Smoker         Basic Information     Date Of Birth Sex Race Ethnicity Preferred Language    1940 Female White Non- English      Your appointments     May 19, 2017  2:50 PM   Established Patient with Sada Guillory M.D.   [address] [id number] [phone]           You will be receiving a confirmation call a few days before your appointment from our automated call confirmation system.            2017  9:30 AM   Est Chemo 4 with RN 3   Infusion Services ([facility])    [address] [place] [id number] [phone]              Problem List              ICD-10-CM Priority Class Noted - Resolved    Hypothyroidism, postablative E89.0   Unknown - Present    Renal cyst N28.1   Unknown - Present    Rheumatoid arthritis (CMS-HCC) M06.9   Unknown - Present    Hyperparathyroidism, primary (CMS-HCC) E21.0   2012 - Present    S/P parathyroidectomy (CMS-HCC) E89.2   Unknown - Present    Post-menopausal osteoporosis M81.0   1/10/2013 - Present    Chronic depressive disorder F32.9   2013 - Present    Tobacco use Z72.0   2013 - Present    Chronic use of steroids [id number]   2013 - Present    Cerebrovascular  disease I67.9   Unknown - Present    Hyperlipemia E78.5   2/18/2016 - Present      Health Maintenance        Date Due Completion Dates    IMM DTaP/Tdap/Td Vaccine (1 - Tdap) 5/11/1959 ---    IMM ZOSTER VACCINE 5/11/2000 ---    MAMMOGRAM 1/5/2011 1/5/2010 (Done), 4/4/2005, 2/13/2004    Override on 1/5/2010: Done    BONE DENSITY 10/23/2017 10/23/2012 (Done)    Override on 10/23/2012: Done    IMM PNEUMOCOCCAL 65+ (ADULT) LOW/MEDIUM RISK SERIES (2 of 2 - PPSV23) 11/18/2017 11/18/2016    COLONOSCOPY 5/30/2022 5/30/2012            Current Immunizations     13-VALENT PCV PREVNAR 11/18/2016    Influenza TIV (IM) 2/22/2012 10:28 PM    Influenza Vaccine Adult HD 11/18/2016, 11/6/2015  3:19 PM    Influenza Vaccine Quad Inj (Pf) 10/9/2014, 11/16/2010    Influenza Vaccine Quad Inj (Preserved) 9/19/2012, 2/22/2012    Pneumococcal Vaccine (UF)Historical Data 2/23/2007      Below and/or attached are the medications your provider expects you to take. Review all of your home medications and newly ordered medications with your provider and/or pharmacist. Follow medication instructions as directed by your provider and/or pharmacist. Please keep your medication list with you and share with your provider. Update the information when medications are discontinued, doses are changed, or new medications (including over-the-counter products) are added; and carry medication information at all times in the event of emergency situations     Allergies:  SULFA DRUGS - (reactions not documented)               Medications  Valid as of: March 28, 2017 -  2:19 PM    Generic Name Brand Name Tablet Size Instructions for use    Calcium Citrate-Vitamin D   Take  by mouth.        Citalopram Hydrobromide (Tab) CELEXA 10 MG Take 1 Tab by mouth every day.        Denosumab   Inject  as instructed every 6 months.        Folic Acid (Tab) FOLVITE 1 MG Take 1 Tab by mouth every day.        Levothyroxine Sodium (Tab) SYNTHROID 175 MCG Take 1 Tab by mouth Every  morning on an empty stomach. Synthroid brand only        Methotrexate Sodium (Tab) methotrexate 2.5 MG Take 6 Tabs by mouth every 7 days. 6 tabs once a week        Omega-3 Fatty Acids (Cap) fish oil 1000 MG Take 1,000 mg by mouth 3 times a day, with meals.        PredniSONE (Tab) DELTASONE 1 MG Take 1 mg by mouth every day.        RiTUXimab (Conc) RITUXAN 10 MG/ML 1,000 mg by Intravenous route.          Rosuvastatin Calcium (Tab) CRESTOR 10 MG TAKE 1 TABLET BY MOUTH DAILY        .                 Medicines prescribed today were sent to:     [facility] - [address] [facility] [address]    Phone: [phone] Fax: [phone]    Open 24 Hours?: No      Medication refill instructions:       If your prescription bottle indicates you have medication refills left, it is not necessary to call your provider’s office. Please contact your pharmacy and they will refill your medication.    If your prescription bottle indicates you do not have any refills left, you may request refills at any time through one of the following ways: The online [employer] system (except Urgent Care), by calling your provider’s office, or by asking your pharmacy to contact your provider’s office with a refill request. Medication refills are processed only during regular business hours and may not be available until the next business day. Your provider may request additional information or to have a follow-up visit with you prior to refilling your medication.   *Please Note: Medication refills are assigned a new Rx number when refilled electronically. Your pharmacy may indicate that no refills were authorized even though a new prescription for the same medication is available at the pharmacy. Please request the medicine by name with the pharmacy before contacting your provider for a refill.           MyChart Status: Patient Declined        Quit Tobacco Information     Do you want to  quit using tobacco?    Quitting tobacco decreases risks of cancer, heart and lung disease, increases life expectancy, improves sense of taste and smell, and increases spending money, among other benefits.    If you are thinking about quitting, we can help.  • [facility] Quit Tobacco Program: [phone]  o Program occurs weekly for four weeks and includes pharmacist consultation on products to support quitting smoking or chewing tobacco. A provider referral is needed for pharmacist consultation.  • Tobacco Users Help Hotline: 6-807-QUIT-NOW (583-0208) or https://[place].quitlogix.org/  o Free, confidential telephone and online coaching for [place] residents. Sessions are designed on a schedule that is convenient for you. Eligible clients receive free nicotine replacement therapy.  • Nationally: www.smokefree.gov  o Information and professional assistance to support both immediate and long-term needs as you become, and remain, a non-smoker. Smokefree.gov allows you to choose the help that best fits your needs.

## 2017-05-04 ENCOUNTER — PATIENT OUTREACH (OUTPATIENT)
Dept: HEALTH INFORMATION MANAGEMENT | Facility: OTHER | Age: 77
End: 2017-05-04

## 2017-05-04 NOTE — PROGRESS NOTES
5/4/17  -  Attempt #:1    WebIZ Checked & Epic Updated: yes  HealthConnect Verified: no (MCR)  Verify PCP: yes    Communication Preference Obtained: yes     Review Care Team: yes    Annual Wellness Visit Scheduling  1. Scheduling Status:Scheduled     Care Gap Scheduling (Attempt to Schedule EACH Overdue Care Gap!)     Health Maintenance Due   Topic Date Due   • Annual Wellness Visit  Declined   • IMM DTaP/Tdap/Td Vaccine (1 - Tdap) Declined Immunizations   • IMM ZOSTER VACCINE     • MAMMOGRAM  Scheduled         MyChart Activation: declined

## 2017-05-24 ENCOUNTER — HOSPITAL ENCOUNTER (OUTPATIENT)
Dept: RADIOLOGY | Facility: MEDICAL CENTER | Age: 77
End: 2017-05-24
Attending: FAMILY MEDICINE
Payer: MEDICARE

## 2017-05-24 DIAGNOSIS — Z12.31 SCREENING MAMMOGRAM, ENCOUNTER FOR: ICD-10-CM

## 2017-05-24 PROCEDURE — 77063 BREAST TOMOSYNTHESIS BI: CPT

## 2017-05-25 ENCOUNTER — TELEPHONE (OUTPATIENT)
Dept: MEDICAL GROUP | Facility: PHYSICIAN GROUP | Age: 77
End: 2017-05-25

## 2017-05-25 NOTE — TELEPHONE ENCOUNTER
----- Message from Christine Escobedo D.O. sent at 5/24/2017  6:01 PM PDT -----  Please call pt to inform her Mammogram shows dense breasts without signs of cancer or mass. Recommend routine follow-up in one year.      -Dr. Escobedo

## 2017-06-19 ENCOUNTER — OUTPATIENT INFUSION SERVICES (OUTPATIENT)
Dept: ONCOLOGY | Facility: MEDICAL CENTER | Age: 77
End: 2017-06-19
Attending: INTERNAL MEDICINE
Payer: MEDICARE

## 2017-06-19 DIAGNOSIS — M06.9 RHEUMATOID ARTHRITIS, UNSPECIFIED (HCC): ICD-10-CM

## 2017-06-19 NOTE — PROGRESS NOTES
Patient's appointment cancelled today - too early for her treatment. Will return in two weeks when her Rituximab is due per MD Pop.

## 2017-07-03 ENCOUNTER — OUTPATIENT INFUSION SERVICES (OUTPATIENT)
Dept: ONCOLOGY | Facility: MEDICAL CENTER | Age: 77
End: 2017-07-03
Attending: INTERNAL MEDICINE
Payer: MEDICARE

## 2017-07-03 VITALS
BODY MASS INDEX: 29.61 KG/M2 | WEIGHT: 177.69 LBS | HEIGHT: 65 IN | OXYGEN SATURATION: 91 % | TEMPERATURE: 98.8 F | HEART RATE: 76 BPM | SYSTOLIC BLOOD PRESSURE: 147 MMHG | RESPIRATION RATE: 18 BRPM | DIASTOLIC BLOOD PRESSURE: 96 MMHG

## 2017-07-03 DIAGNOSIS — M06.9 RHEUMATOID ARTHRITIS, INVOLVING UNSPECIFIED SITE, UNSPECIFIED RHEUMATOID FACTOR PRESENCE: ICD-10-CM

## 2017-07-03 LAB
ALBUMIN SERPL BCP-MCNC: 4 G/DL (ref 3.2–4.9)
ALBUMIN/GLOB SERPL: 1.7 G/DL
ALP SERPL-CCNC: 80 U/L (ref 30–99)
ALT SERPL-CCNC: 27 U/L (ref 2–50)
ANION GAP SERPL CALC-SCNC: 8 MMOL/L (ref 0–11.9)
AST SERPL-CCNC: 19 U/L (ref 12–45)
BASOPHILS # BLD AUTO: 0.3 % (ref 0–1.8)
BASOPHILS # BLD: 0.02 K/UL (ref 0–0.12)
BILIRUB SERPL-MCNC: 0.7 MG/DL (ref 0.1–1.5)
BUN SERPL-MCNC: 14 MG/DL (ref 8–22)
CALCIUM SERPL-MCNC: 8.7 MG/DL (ref 8.5–10.5)
CHLORIDE SERPL-SCNC: 109 MMOL/L (ref 96–112)
CO2 SERPL-SCNC: 23 MMOL/L (ref 20–33)
CREAT SERPL-MCNC: 0.61 MG/DL (ref 0.5–1.4)
EOSINOPHIL # BLD AUTO: 0.07 K/UL (ref 0–0.51)
EOSINOPHIL NFR BLD: 0.9 % (ref 0–6.9)
ERYTHROCYTE [DISTWIDTH] IN BLOOD BY AUTOMATED COUNT: 51.4 FL (ref 35.9–50)
GFR SERPL CREATININE-BSD FRML MDRD: >60 ML/MIN/1.73 M 2
GLOBULIN SER CALC-MCNC: 2.4 G/DL (ref 1.9–3.5)
GLUCOSE SERPL-MCNC: 104 MG/DL (ref 65–99)
HCT VFR BLD AUTO: 47.5 % (ref 37–47)
HGB BLD-MCNC: 15.6 G/DL (ref 12–16)
IMM GRANULOCYTES # BLD AUTO: 0.02 K/UL (ref 0–0.11)
IMM GRANULOCYTES NFR BLD AUTO: 0.3 % (ref 0–0.9)
LYMPHOCYTES # BLD AUTO: 1.07 K/UL (ref 1–4.8)
LYMPHOCYTES NFR BLD: 13.5 % (ref 22–41)
MCH RBC QN AUTO: 30.4 PG (ref 27–33)
MCHC RBC AUTO-ENTMCNC: 32.8 G/DL (ref 33.6–35)
MCV RBC AUTO: 92.4 FL (ref 81.4–97.8)
MONOCYTES # BLD AUTO: 0.55 K/UL (ref 0–0.85)
MONOCYTES NFR BLD AUTO: 6.9 % (ref 0–13.4)
NEUTROPHILS # BLD AUTO: 6.22 K/UL (ref 2–7.15)
NEUTROPHILS NFR BLD: 78.1 % (ref 44–72)
NRBC # BLD AUTO: 0 K/UL
NRBC BLD AUTO-RTO: 0 /100 WBC
PLATELET # BLD AUTO: 239 K/UL (ref 164–446)
PMV BLD AUTO: 10.2 FL (ref 9–12.9)
POTASSIUM SERPL-SCNC: 4 MMOL/L (ref 3.6–5.5)
PROT SERPL-MCNC: 6.4 G/DL (ref 6–8.2)
RBC # BLD AUTO: 5.14 M/UL (ref 4.2–5.4)
SODIUM SERPL-SCNC: 140 MMOL/L (ref 135–145)
WBC # BLD AUTO: 8 K/UL (ref 4.8–10.8)

## 2017-07-03 PROCEDURE — 96415 CHEMO IV INFUSION ADDL HR: CPT

## 2017-07-03 PROCEDURE — 80053 COMPREHEN METABOLIC PANEL: CPT

## 2017-07-03 PROCEDURE — A9270 NON-COVERED ITEM OR SERVICE: HCPCS | Performed by: INTERNAL MEDICINE

## 2017-07-03 PROCEDURE — 700105 HCHG RX REV CODE 258: Performed by: INTERNAL MEDICINE

## 2017-07-03 PROCEDURE — 700102 HCHG RX REV CODE 250 W/ 637 OVERRIDE(OP): Performed by: INTERNAL MEDICINE

## 2017-07-03 PROCEDURE — 96375 TX/PRO/DX INJ NEW DRUG ADDON: CPT

## 2017-07-03 PROCEDURE — 700111 HCHG RX REV CODE 636 W/ 250 OVERRIDE (IP): Performed by: INTERNAL MEDICINE

## 2017-07-03 PROCEDURE — 96413 CHEMO IV INFUSION 1 HR: CPT

## 2017-07-03 PROCEDURE — 85025 COMPLETE CBC W/AUTO DIFF WBC: CPT

## 2017-07-03 RX ORDER — METHYLPREDNISOLONE SODIUM SUCCINATE 125 MG/2ML
100 INJECTION, POWDER, LYOPHILIZED, FOR SOLUTION INTRAMUSCULAR; INTRAVENOUS ONCE
Status: COMPLETED | OUTPATIENT
Start: 2017-07-03 | End: 2017-07-03

## 2017-07-03 RX ORDER — ACETAMINOPHEN 325 MG/1
650 TABLET ORAL ONCE
Status: COMPLETED | OUTPATIENT
Start: 2017-07-03 | End: 2017-07-03

## 2017-07-03 RX ORDER — DIPHENHYDRAMINE HCL 25 MG
25 TABLET ORAL ONCE
Status: COMPLETED | OUTPATIENT
Start: 2017-07-03 | End: 2017-07-03

## 2017-07-03 RX ADMIN — ACETAMINOPHEN 650 MG: 325 TABLET, FILM COATED ORAL at 10:52

## 2017-07-03 RX ADMIN — METHYLPREDNISOLONE SODIUM SUCCINATE 100 MG: 125 INJECTION, POWDER, FOR SOLUTION INTRAMUSCULAR; INTRAVENOUS at 10:53

## 2017-07-03 RX ADMIN — RITUXIMAB 1000 MG: 10 INJECTION, SOLUTION INTRAVENOUS at 11:34

## 2017-07-03 RX ADMIN — DIPHENHYDRAMINE HCL 25 MG: 25 TABLET ORAL at 10:52

## 2017-07-03 ASSESSMENT — PAIN SCALES - GENERAL: PAINLEVEL: NO PAIN

## 2017-07-03 NOTE — PROGRESS NOTES
Pt arrived to [facility] ambulatory, here for D1 Rituxan. IV access established and labs drawn per pharmacy protocol. Lab results reviewed and WNL for chemo today. Premeds given and Rituxan infused with subsequent rate administration. Pt ophelia well. Pt knows to return in 2 weeks. Plans are for pt to receive Prolia next infusion as well. Will need orders. IV flushed and removed. Pressure dressing applied. Pt knows to return in 2 weeks.

## 2017-07-03 NOTE — PROGRESS NOTES
"Pharmacy chemotherapy Verification:    Gaye Antoine    Protocol: Rituximab     Rituximab 1000 mg IV Days 1 and 15  [id number]  Jag HITCHCOCK, et al. N Engl J Med 2004; [id number]. Efficacy of B-Cell-Targeted Therapy with Rituximab in Patients with Rheumatoid Arthritis    Dx: RA       Allergies: Sulfa drugs      /96 mmHg  Pulse 76  Temp(Src) 37.1 °C (98.8 °F)  Resp 18  Ht 1.66 m (5' 5.35\")  Wt 80.6 kg (177 lb 11.1 oz)  BMI 29.25 kg/m2  SpO2 91%  LMP 03/01/2005    Body surface area is 1.93 meters squared.    Labs 7/3/17:  ANC~ 6220 Plt = 239k   Hgb = 15.6   SCr = 0.61 mg/dL CrCl ~ 86 mL/min (min Scr 0.7 used) LFT's = WNL TBili = 0.7   K+ = 4    9/28/16 Quantiferon Gold: negative  12/2/16 Hep B: negative     Drug Order   (Drug name, dose, route, IV Fluid & volume, frequency, number of doses) Cycle:  2  Day 1   (at [facility])  Previous treatment: C1D15 = 1/18/17      Medication = Rituximab  Base Dose= 1000mg  Calc Dose: n/a  Final Dose = 1000mg  Route = IV  Fluid & Volume =  mL  Admin Duration = per rate sheet          Fixed dose. No calulation required.  ok to treat with final dose       By my signature below, I confirm this process was performed independently with the BSA and all final chemotherapy dosing calculations congruent. I have reviewed the above chemotherapy order and that my calculation of the final dose and BSA (when applicable) corroborate those calculations of the  pharmacist. Discrepancies of 5% or greater in the written dose have been addressed and documented within the [facility] Progress notes.    Signature: Milagors Kaplan, PharmD           "

## 2017-07-03 NOTE — PROGRESS NOTES
"Pharmacy Chemotherapy Verification    Patient Name: Gaye Antoine  Dx: Rheumatoid arthritis  Cycle: 2 at [facility], Day 1   Previous treatment = 1/18/17 = C1D15 at [facility]    Regimen and Dosing Reference  Rituximab 1000 mg IV Days 1 and 15  Q6 months  Jag HITCHCOCK, et al. N Engl J Med 2004; 350:9470-7528. Efficacy of B-Cell-Targeted Therapy with Rituximab in Patients with Rheumatoid Arthritis    Allergies: Sulfa drugs  /96 mmHg  Pulse 76  Temp(Src) 37.1 °C (98.8 °F)  Resp 18  Ht 1.66 m (5' 5.35\")  Wt 80.6 kg (177 lb 11.1 oz)  BMI 29.25 kg/m2  SpO2 91%  LMP 03/01/2005\  Body surface area is 1.93 meters squared.    Labs 7/3/17   ANC ~ 6220     Plt = 239 k    Hgb = 15.6      SCr =  0.61      CrCl ~86 ml/min (min Cr 0.7)    AST/ALT/AP/TBili = WNL/0.7    Rituximab 1000 mg IV fixed dose   No calculation required   OK to treat with final dose = 1000 mg IV    Donaldo Phillips, PharmD    "

## 2017-07-10 RX ORDER — LEVOTHYROXINE SODIUM 175 MCG
TABLET ORAL
Qty: 10 TAB | Refills: 0 | Status: SHIPPED | OUTPATIENT
Start: 2017-07-10 | End: 2018-06-25 | Stop reason: SDUPTHER

## 2017-07-10 NOTE — TELEPHONE ENCOUNTER
Call patient, she needs to have labs done as ordered by PCP in November.   #10 sent to pharmacy      Requested Prescriptions     Signed Prescriptions Disp Refills   • SYNTHROID 175 MCG Tab 10 Tab 0     Sig: TAKE 1 TABLET BY MOUTH  EVERY MORNING ON AN EMPTY  STOMACH.     Authorizing Provider: JOE RODNEY     Ordering User: FARA HIEN     RX sent to pharmacy.  JOSEPH Bingham covering for Joe Rodney M.D.

## 2017-07-17 ENCOUNTER — OUTPATIENT INFUSION SERVICES (OUTPATIENT)
Dept: ONCOLOGY | Facility: MEDICAL CENTER | Age: 77
End: 2017-07-17
Attending: INTERNAL MEDICINE
Payer: MEDICARE

## 2017-07-17 VITALS
TEMPERATURE: 98.6 F | BODY MASS INDEX: 29.46 KG/M2 | WEIGHT: 176.81 LBS | DIASTOLIC BLOOD PRESSURE: 86 MMHG | SYSTOLIC BLOOD PRESSURE: 133 MMHG | HEIGHT: 65 IN | RESPIRATION RATE: 16 BRPM | HEART RATE: 86 BPM | OXYGEN SATURATION: 98 %

## 2017-07-17 PROCEDURE — 96401 CHEMO ANTI-NEOPL SQ/IM: CPT

## 2017-07-17 PROCEDURE — 96413 CHEMO IV INFUSION 1 HR: CPT

## 2017-07-17 PROCEDURE — 700111 HCHG RX REV CODE 636 W/ 250 OVERRIDE (IP): Performed by: INTERNAL MEDICINE

## 2017-07-17 PROCEDURE — A9270 NON-COVERED ITEM OR SERVICE: HCPCS | Performed by: INTERNAL MEDICINE

## 2017-07-17 PROCEDURE — 700102 HCHG RX REV CODE 250 W/ 637 OVERRIDE(OP): Performed by: INTERNAL MEDICINE

## 2017-07-17 PROCEDURE — 96415 CHEMO IV INFUSION ADDL HR: CPT

## 2017-07-17 PROCEDURE — 96375 TX/PRO/DX INJ NEW DRUG ADDON: CPT

## 2017-07-17 PROCEDURE — 700105 HCHG RX REV CODE 258: Performed by: INTERNAL MEDICINE

## 2017-07-17 RX ORDER — METHYLPREDNISOLONE SODIUM SUCCINATE 125 MG/2ML
100 INJECTION, POWDER, LYOPHILIZED, FOR SOLUTION INTRAMUSCULAR; INTRAVENOUS ONCE
Status: COMPLETED | OUTPATIENT
Start: 2017-07-17 | End: 2017-07-17

## 2017-07-17 RX ORDER — DIPHENHYDRAMINE HCL 25 MG
25 TABLET ORAL ONCE
Status: COMPLETED | OUTPATIENT
Start: 2017-07-17 | End: 2017-07-17

## 2017-07-17 RX ORDER — ACETAMINOPHEN 325 MG/1
650 TABLET ORAL ONCE
Status: COMPLETED | OUTPATIENT
Start: 2017-07-17 | End: 2017-07-17

## 2017-07-17 RX ADMIN — METHYLPREDNISOLONE SODIUM SUCCINATE 100 MG: 125 INJECTION, POWDER, FOR SOLUTION INTRAMUSCULAR; INTRAVENOUS at 10:43

## 2017-07-17 RX ADMIN — DIPHENHYDRAMINE HCL 25 MG: 25 TABLET ORAL at 10:40

## 2017-07-17 RX ADMIN — RITUXIMAB 1000 MG: 10 INJECTION, SOLUTION INTRAVENOUS at 11:16

## 2017-07-17 RX ADMIN — ACETAMINOPHEN 650 MG: 325 TABLET, FILM COATED ORAL at 10:40

## 2017-07-17 RX ADMIN — DENOSUMAB 60 MG: 60 INJECTION SUBCUTANEOUS at 14:48

## 2017-07-17 ASSESSMENT — PAIN SCALES - GENERAL: PAINLEVEL: 1=MINIMAL PAIN

## 2017-07-17 NOTE — PROGRESS NOTES
"Pharmacy Chemotherapy Verification    Gaye Antoine  Dx: RA        Protocol: Rituximab     Rituximab 1000 mg IV Days 1 and 15  C0lokrwn  Jag HITCHCOCK, et al. N Engl J Med 2004; 350:9764-9732. Efficacy of B-Cell-Targeted Therapy with Rituximab in Patients with Rheumatoid Arthritis       Allergies: Sulfa drugs      /98 mmHg  Pulse 86  Temp(Src) 37 °C (98.6 °F)  Resp 18  Ht 1.66 m (5' 5.35\")  Wt 80.2 kg (176 lb 12.9 oz)  BMI 29.10 kg/m2  SpO2 91%  LMP 03/01/2005    Body surface area is 1.92 meters squared.    Labs 7/3/17  ANC~ 6220 Plt = 239k   Hgb = 15.6   SCr = 0.61 mg/dL CrCl ~ 86 mL/min (min Scr 0.7 used) LFT's = WNL TBili = 0.7   K+ = 4    9/28/16 Quantiferon Gold: negative  12/2/16 Hep B: negative     Drug Order   (Drug name, dose, route, IV Fluid & volume, frequency, number of doses) Cycle:  2  Day 15   (at [facility])  Previous treatment: C2D1 = 7/3/17      Medication = Rituximab  Base Dose= 1000 mg  Calc Dose: n/a  Final Dose = 1000 mg  Route = IV  Fluid & Volume =  mL  Admin Duration = per rate sheet          Fixed dose. No calulation required.  OK to treat with final dose.       By my signature below, I confirm this process was performed independently with the BSA and all final chemotherapy dosing calculations congruent. I have reviewed the above chemotherapy order and that my calculation of the final dose and BSA (when applicable) corroborate those calculations of the  pharmacist. Discrepancies of 5% or greater in the written dose have been addressed and documented within the [facility] Progress notes.    Power Grajeda, PharmD            "

## 2017-07-17 NOTE — PROGRESS NOTES
Chemotherapy Verification - PRIMARY RN      Height = 65.3in  Weight = 80.2kg  BSA = 1.91m2      Medication: Rituxan  Dose: 1000mg set dose  Calculated Dose: 1000mg                             (In mg/m2, AUC, mg/kg)     I confirm this process was performed independently with the BSA and all final chemotherapy dosing calculations congruent.  Any discrepancies of 5% or greater have been addressed with the chemotherapy pharmacist. The resolution of the discrepancy has been documented in the EPIC progress notes.

## 2017-07-17 NOTE — PROGRESS NOTES
Chemotherapy Verification - SECONDARY RN       Height = 65.35in  Weight = 80.2kg  BSA = 1.92m2       Medication: Rituxan  Dose: 1000mg  Calculated Dose: 1000mg SET DOSE for RA                             (In mg/m2, AUC, mg/kg)       TB negative  Hep B negative    I confirm that this process was performed independently.

## 2017-07-17 NOTE — PROGRESS NOTES
"Pharmacy Chemotherapy Verification    Patient Name: Gaye Antoine  Dx: Rheumatoid arthritis  Cycle: 2 at [facility], Day 15   Previous treatment = 7/3/17     Regimen and Dosing Reference  Rituximab 1000 mg IV Days 1 and 15  Q6 months  Jag HITCHCOCK, et al. N Engl J Med 2004; 350:[id number]. Efficacy of B-Cell-Targeted Therapy with Rituximab in Patients with Rheumatoid Arthritis    Allergies: Sulfa drugs  /98 mmHg  Pulse 86  Temp(Src) 37 °C (98.6 °F)  Resp 18  Ht 1.66 m (5' 5.35\")  Wt 80.2 kg (176 lb 12.9 oz)  BMI 29.10 kg/m2  SpO2 91%  LMP 03/01/2005\  Body surface area is 1.92 meters squared.    Labs 7/3/17   ANC ~ 6220     Plt = 239 k    Hgb = 15.6      SCr =  0.61      CrCl ~86 ml/min (min Cr 0.7)    AST/ALT/AP/TBili = WNL/0.7  Calcium = 8.7    Rituximab 1000 mg IV fixed dose   No calculation required   OK to treat with final dose = 1000 mg IV OK for 90 minute \"rapid infusion\"    Prolia  60 mg SQ given today    Akira DonaldD    "

## 2017-07-18 NOTE — PROGRESS NOTES
Patient arrived ambulatory to the [facility] for Rituxan and Prolia. Reviewed vital signs, MD orders, and labs, patient denies s/s of infection. Obtained IV access to left forearm with brisk blood return, administered pre-medications. Administered Rituxan per moderate titration scale, patient tolerated with no adverse reaction. Administered Prolia to left upper back arm , band aid placed. Flushed IV per protocol, removed IV, tip intact, placed gauze dressing with coband. Provided patient hand out of upcoming apt in 6 months. Patient left ambulatory in no signs of distress at 1455.

## 2017-11-28 ENCOUNTER — HOSPITAL ENCOUNTER (OUTPATIENT)
Dept: LAB | Facility: MEDICAL CENTER | Age: 77
End: 2017-11-28
Attending: INTERNAL MEDICINE
Payer: MEDICARE

## 2017-11-28 DIAGNOSIS — M05.742 RHEUMATOID ARTHRITIS INVOLVING BOTH HANDS WITH POSITIVE RHEUMATOID FACTOR (HCC): ICD-10-CM

## 2017-11-28 DIAGNOSIS — M05.741 RHEUMATOID ARTHRITIS INVOLVING BOTH HANDS WITH POSITIVE RHEUMATOID FACTOR (HCC): ICD-10-CM

## 2017-11-28 DIAGNOSIS — M06.9 RHEUMATOID ARTHRITIS INVOLVING MULTIPLE SITES, UNSPECIFIED RHEUMATOID FACTOR PRESENCE: ICD-10-CM

## 2017-11-28 DIAGNOSIS — Z79.899 ENCOUNTER FOR LONG-TERM (CURRENT) USE OF HIGH-RISK MEDICATION: ICD-10-CM

## 2017-11-28 LAB
ALBUMIN SERPL BCP-MCNC: 4.6 G/DL (ref 3.2–4.9)
ALP SERPL-CCNC: 92 U/L (ref 30–99)
ALT SERPL-CCNC: 37 U/L (ref 2–50)
AST SERPL-CCNC: 26 U/L (ref 12–45)
BASOPHILS # BLD AUTO: 0.5 % (ref 0–1.8)
BASOPHILS # BLD: 0.05 K/UL (ref 0–0.12)
BILIRUB CONJ SERPL-MCNC: 0.1 MG/DL (ref 0.1–0.5)
BILIRUB INDIRECT SERPL-MCNC: 0.4 MG/DL (ref 0–1)
BILIRUB SERPL-MCNC: 0.5 MG/DL (ref 0.1–1.5)
BUN SERPL-MCNC: 15 MG/DL (ref 8–22)
CREAT SERPL-MCNC: 0.72 MG/DL (ref 0.5–1.4)
CRP SERPL HS-MCNC: 0.34 MG/DL (ref 0–0.75)
EOSINOPHIL # BLD AUTO: 0.03 K/UL (ref 0–0.51)
EOSINOPHIL NFR BLD: 0.3 % (ref 0–6.9)
ERYTHROCYTE [DISTWIDTH] IN BLOOD BY AUTOMATED COUNT: 54.9 FL (ref 35.9–50)
ERYTHROCYTE [SEDIMENTATION RATE] IN BLOOD BY WESTERGREN METHOD: 6 MM/HOUR (ref 0–30)
GFR SERPL CREATININE-BSD FRML MDRD: >60 ML/MIN/1.73 M 2
HCT VFR BLD AUTO: 50 % (ref 37–47)
HGB BLD-MCNC: 16.3 G/DL (ref 12–16)
IMM GRANULOCYTES # BLD AUTO: 0.05 K/UL (ref 0–0.11)
IMM GRANULOCYTES NFR BLD AUTO: 0.5 % (ref 0–0.9)
LYMPHOCYTES # BLD AUTO: 1.34 K/UL (ref 1–4.8)
LYMPHOCYTES NFR BLD: 14.4 % (ref 22–41)
MCH RBC QN AUTO: 30.2 PG (ref 27–33)
MCHC RBC AUTO-ENTMCNC: 32.6 G/DL (ref 33.6–35)
MCV RBC AUTO: 92.8 FL (ref 81.4–97.8)
MONOCYTES # BLD AUTO: 0.62 K/UL (ref 0–0.85)
MONOCYTES NFR BLD AUTO: 6.6 % (ref 0–13.4)
NEUTROPHILS # BLD AUTO: 7.24 K/UL (ref 2–7.15)
NEUTROPHILS NFR BLD: 77.7 % (ref 44–72)
NRBC # BLD AUTO: 0 K/UL
NRBC BLD AUTO-RTO: 0 /100 WBC
PLATELET # BLD AUTO: 261 K/UL (ref 164–446)
PMV BLD AUTO: 10.8 FL (ref 9–12.9)
PROT SERPL-MCNC: 7 G/DL (ref 6–8.2)
RBC # BLD AUTO: 5.39 M/UL (ref 4.2–5.4)
RHEUMATOID FACT SER IA-ACNC: >360 IU/ML (ref 0–14)
WBC # BLD AUTO: 9.3 K/UL (ref 4.8–10.8)

## 2017-11-28 PROCEDURE — 86431 RHEUMATOID FACTOR QUANT: CPT

## 2017-11-28 PROCEDURE — 86140 C-REACTIVE PROTEIN: CPT

## 2017-11-28 PROCEDURE — 84520 ASSAY OF UREA NITROGEN: CPT

## 2017-11-28 PROCEDURE — 36415 COLL VENOUS BLD VENIPUNCTURE: CPT

## 2017-11-28 PROCEDURE — 85652 RBC SED RATE AUTOMATED: CPT

## 2017-11-28 PROCEDURE — 80076 HEPATIC FUNCTION PANEL: CPT

## 2017-11-28 PROCEDURE — 82565 ASSAY OF CREATININE: CPT

## 2017-11-28 PROCEDURE — 85025 COMPLETE CBC W/AUTO DIFF WBC: CPT

## 2017-11-29 DIAGNOSIS — M05.742 RHEUMATOID ARTHRITIS INVOLVING BOTH HANDS WITH POSITIVE RHEUMATOID FACTOR (HCC): ICD-10-CM

## 2017-11-29 DIAGNOSIS — M05.741 RHEUMATOID ARTHRITIS INVOLVING BOTH HANDS WITH POSITIVE RHEUMATOID FACTOR (HCC): ICD-10-CM

## 2018-01-04 ENCOUNTER — TELEPHONE (OUTPATIENT)
Dept: RHEUMATOLOGY | Facility: PHYSICIAN GROUP | Age: 78
End: 2018-01-04

## 2018-01-04 NOTE — TELEPHONE ENCOUNTER
Sonia from Infusion center called to let you know that pt has appt 1/8/18 for Rituxan, they need a new order sent over ASAP.

## 2018-01-08 ENCOUNTER — OUTPATIENT INFUSION SERVICES (OUTPATIENT)
Dept: ONCOLOGY | Facility: MEDICAL CENTER | Age: 78
End: 2018-01-08
Attending: INTERNAL MEDICINE
Payer: MEDICARE

## 2018-01-08 VITALS
HEART RATE: 87 BPM | RESPIRATION RATE: 18 BRPM | TEMPERATURE: 97.6 F | DIASTOLIC BLOOD PRESSURE: 91 MMHG | BODY MASS INDEX: 29.61 KG/M2 | OXYGEN SATURATION: 90 % | SYSTOLIC BLOOD PRESSURE: 140 MMHG | HEIGHT: 65 IN | WEIGHT: 177.69 LBS

## 2018-01-08 PROCEDURE — 306780 HCHG STAT FOR TRANSFUSION PER CASE

## 2018-01-08 NOTE — PROGRESS NOTES
"Patient presents for Rituxan and Prolia. Patient with active \"cold\" that she \"has had for three weeks\". Patient with cough and large amount of mucus. Call placed to Dr. Islas. Per MD patient deferred until her symptoms are resolved. Informed patient and she verbalizes understanding. Patient re-scheduled for next week and instructed to call if she is still ill. Patient released in no acute distress.  "

## 2018-01-15 ASSESSMENT — ENCOUNTER SYMPTOMS
BACK PAIN: 0
CHILLS: 0
FEVER: 0
FALLS: 0

## 2018-01-15 NOTE — PROGRESS NOTES
Subjective:   Date of Consultation:   Primary care physician:Sada Guillory M.D.      Reason for Consultation:  Ms. Antoine  is a pleasant 76 y.o. year old female who presented with  Follow-up for Rheumatoid Arthritis    Rheumatoid Arthritis  *** At last visit in March 2018 she was doing well. ***She reports that she has been controlled since being on rituximab. She denies any joint swelling morning stiffness or flares. She is tolerating her medications well with no hair loss or mouth ulcers.    Current Medications  Methotrexate 6 tabs weekly q Wednesday  Folic acid  rituxan infusion 1/4/2017 and 1/18/2017  Prednisone 1 mg daily      RHEUM HISTORY  She was diagnosed in 1984 with presenting symptoms of burning feet.  At the time she denied rash.  EMG was not completed.  She states the burning of her feet were the only symptoms.  She tried GOLD and then prednisone.  Ms. Gaye Antoine reports she has tried several medications and only rituxan works.  She reports being on methotrexate x 30 years. She cannot recall the names of the medications    Osteopenia  On prolia  Last injection was 1/2017  DEXA 10/23/2012 showed osteopenia with FRAX scores that are elevated > 20% and > 3 % for 10 year risk of major fracture and hip fracture respectively  Taking calcium C and vitmain D ( takes 3 pills daily)        Past Medical History:   Diagnosis Date   • Cholelithiasis 2010   • Chronic constipation    • Chronic depressive disorder 1/19/2013   • Chronic use of steroids 1/19/2013   • CVA (cerebral infarction) 2008    L hemiparesis / ? 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, late effect of cerebrovascular disease (CMS-[facility]) 1/19/2013   • Renal cyst 2010    R kidney   • Rheumatoid arthritis(714.0)    • S/P parathyroidectomy  (CMS-[facility]) 2012    single adenoma   • Tobacco use 1/19/2013     Past Surgical History:   Procedure Laterality Date   • PARATHYROID EXPLORATION  2/22/2012    Performed by CURTIS DUPONT at [facility]   • HIP CANNULATED SCREW  6/15/2009    Performed by DANIELLE LANG at [facility]   • ANKLE FUSION  1/8/2009    Performed by WM GONSALO HERRERA at [facility]   • OTHER  2008    submaxillary gland infection   • MAMMOPLASTY REDUCTION  2006    complicated by abscess needing I&D   • APPENDECTOMY  age 7   • CHOLECYSTECTOMY     • RHYTIDECTOMY       Allergies   Allergen Reactions   • Sulfa Drugs      Not sure     Outpatient Encounter Prescriptions as of 1/17/2018   Medication Sig Dispense Refill   • methotrexate 2.5 MG Tab Take 6 tablets by mouth  every 7 days lab due before 2/28/2018 for refills 78 Tab 0   • predniSONE (DELTASONE) 1 MG Tab Take 1 Tab by mouth every day. 30 Tab 0   • SYNTHROID 175 MCG Tab TAKE 1 TABLET BY MOUTH  EVERY MORNING ON AN EMPTY  STOMACH. 10 Tab 0   • folic acid (FOLVITE) 1 MG Tab Take 1 Tab by mouth every day. 30 Tab 11   • Calcium Citrate-Vitamin D (CALCIUM + D PO) Take  by mouth.     • Omega-3 Fatty Acids (FISH OIL) 1000 MG Cap capsule Take 1,000 mg by mouth 3 times a day, with meals.     • rosuvastatin (CRESTOR) 10 MG Tab TAKE 1 TABLET BY MOUTH DAILY 90 Tab 2   • citalopram (CELEXA) 10 MG tablet Take 1 Tab by mouth every day. 90 Tab 2   • Denosumab (PROLIA SC) Inject  as instructed every 6 months.     • riTUXimab (RITUXAN) 10 MG/ML CONC 1,000 mg by Intravenous route.         No facility-administered encounter medications on file as of 1/17/2018.      Social History     Social History   • Marital status:      Spouse name: N/A   • Number of children: 1   • Years of education: some colle     Occupational History   • [employer] Retired     Social History Main Topics   • Smoking status: Current Some Day Smoker     Packs/day:  0.50     Years: 50.00     Types: Cigarettes   • Smokeless tobacco: Never Used      Comment: Previous heavy 1 ppd smoker.    • Alcohol use 0.0 oz/week      Comment: rare   • Drug use: No   • Sexual activity: Not Currently      Comment:       Other Topics Concern   • Not on file     Social History Narrative    Retired  for [employer].         51 yo son.    No grandchildren.    Sister with PCKD may have been 1/2 sister (family secret)      History   Smoking Status   • Current Some Day Smoker   • Packs/day: 0.50   • Years: 50.00   • Types: Cigarettes   Smokeless Tobacco   • Never Used     Comment: Previous heavy 1 ppd smoker.      History   Alcohol Use   • 0.0 oz/week     Comment: rare     History   Drug Use No      OB History    Para Term  AB Living   1 1           SAB TAB Ectopic Molar Multiple Live Births                    # Outcome Date GA Lbr Khanh/2nd Weight Sex Delivery Anes PTL Lv   1 Para                  Patient's last menstrual period was 2005.    G P A L     Family History   Problem Relation Age of Onset   • Genitourinary () Sister      polycystic kidneys   • Cancer Mother      breast   • Lung Disease Mother      TB   • Heart Disease Father      aneurysm       Review of Systems   Constitutional: Negative for chills and fever.   Musculoskeletal: Negative for back pain, falls and joint pain.        Objective:   LMP 2005     Physical Exam   Constitutional: She is oriented to person, place, and time. She appears well-developed and well-nourished. No distress.   HENT:   Head: Normocephalic and atraumatic.   Right Ear: External ear normal.   Left Ear: External ear normal.   Eyes: Conjunctivae are normal. Right eye exhibits no discharge. Left eye exhibits no discharge. No scleral icterus.   Pulmonary/Chest: No stridor. No respiratory distress.   Abdominal: Soft. She exhibits no distension.   No hepatosplenomegaly   Musculoskeletal: She  exhibits no edema.   Left ankle swelling which she states is chronic. No synovitis appreciated in MCPs, PIPs or DIPs. No synovitis she and the wrists bilateral.   Neurological: She is alert and oriented to person, place, and time.   Skin: She is not diaphoretic.   Over the area of the right press there is an mild area of erythema with no fluctuation or induration appreciated   Psychiatric: She has a normal mood and affect. Her behavior is normal. Judgment and thought content normal.       Assessment:     No diagnosis found.  Labs:  Scooby last injection  Lab Results   Component Value Date/Time    QNTTBGOLD Negative 09/28/2016 12:19 PM     Lab Results   Component Value Date/Time    HEPBCORTOT Negative 12/02/2016 01:48 PM    HEPBSAG Negative 12/02/2016 01:48 PM     Lab Results   Component Value Date/Time    HEPCAB Negative 12/02/2016 01:48 PM     Lab Results   Component Value Date/Time    SODIUM 140 07/03/2017 10:13 AM    POTASSIUM 4.0 07/03/2017 10:13 AM    CHLORIDE 109 07/03/2017 10:13 AM    CO2 23 07/03/2017 10:13 AM    GLUCOSE 104 (H) 07/03/2017 10:13 AM    BUN 15 11/28/2017 02:25 PM    CREATININE 0.72 11/28/2017 02:25 PM    CREATININE 0.8 12/31/2008 02:01 PM      Lab Results   Component Value Date/Time    WBC 9.3 11/28/2017 02:25 PM    RBC 5.39 11/28/2017 02:25 PM    HEMOGLOBIN 16.3 (H) 11/28/2017 02:25 PM    HEMATOCRIT 50.0 (H) 11/28/2017 02:25 PM    MCV 92.8 11/28/2017 02:25 PM    MCH 30.2 11/28/2017 02:25 PM    MCHC 32.6 (L) 11/28/2017 02:25 PM    MPV 10.8 11/28/2017 02:25 PM    NEUTSPOLYS 77.70 (H) 11/28/2017 02:25 PM    LYMPHOCYTES 14.40 (L) 11/28/2017 02:25 PM    MONOCYTES 6.60 11/28/2017 02:25 PM    EOSINOPHILS 0.30 11/28/2017 02:25 PM    BASOPHILS 0.50 11/28/2017 02:25 PM    HYPOCHROMIA 1+ 05/09/2014 12:01 PM    ANISOCYTOSIS 1+ 06/10/2008 05:00 AM      Lab Results   Component Value Date/Time    CALCIUM 8.7 07/03/2017 10:13 AM    ASTSGOT 26 11/28/2017 02:25 PM    ALTSGPT 37 11/28/2017 02:25 PM     ALKPHOSPHAT 92 11/28/2017 02:25 PM    TBILIRUBIN 0.5 11/28/2017 02:25 PM    ALBUMIN 4.6 11/28/2017 02:25 PM    TOTPROTEIN 7.0 11/28/2017 02:25 PM     Lab Results   Component Value Date/Time    RHEUMFACTN >360 (H) 11/28/2017 02:21 PM    CCPANTIBODY 106 (H) 09/28/2016 12:19 PM    ANTINUCAB NONE DET 06/12/2008 06:15 AM     Lab Results   Component Value Date/Time    SEDRATEWES 6 11/28/2017 02:25 PM    CREACTPROT 0.34 11/28/2017 02:25 PM     No results found for: RUSSELVIPER, DRVVTINTERP  No results found for: A9JHMCAMCTL, N1XCACBPSEZ, IGGCARDIOLI, IGMCARDIOLI, IGACARDIOLI, CRYOGLOBULIN  Lab Results   Component Value Date/Time    ANTIDNADS None Detected 09/28/2016 12:19 PM    RNPAB 0 09/28/2016 12:19 PM    SMITHAB 0 09/28/2016 12:19 PM    DIPMDKP64 0 09/28/2016 12:19 PM    CENTROMBAB 2 09/28/2016 12:19 PM     Lab Results   Component Value Date/Time    ANTIDNADS None Detected 09/28/2016 12:19 PM    RNPAB 0 09/28/2016 12:19 PM    ANTISSBSJ 0 09/28/2016 12:19 PM     Lab Results   Component Value Date/Time    COLORURINE Lt. Yellow 05/19/2015 02:30 PM    SPECGRAVITY 1.015 05/19/2015 02:30 PM    PHURINE 5.5 05/19/2015 02:30 PM    GLUCOSEUR Negative 05/19/2015 02:30 PM    KETONES Negative 05/19/2015 02:30 PM    PROTEINURIN Negative 05/19/2015 02:30 PM     Lab Results   Component Value Date/Time    TOTALVOLUME 1500 12/21/2011 12:00 AM     Lab Results   Component Value Date/Time    SSA60 1 09/28/2016 12:19 PM    SSA52 4 09/28/2016 12:19 PM     Lab Results   Component Value Date/Time    HBA1C 5.5 05/09/2014 11:59 AM     Lab Results   Component Value Date/Time    CPKTOTAL 69 09/28/2016 12:19 PM     No results found for: G6PD  No results found for: AULO43VKRB  No results found for: ACESERUM  Lab Results   Component Value Date/Time    25HYDROXY 25 (L) 05/09/2014 11:59 AM     No results found for: TSH, FREEDIR  Lab Results   Component Value Date/Time    TSHULTRASEN 1.630 03/11/2016 03:07 PM    FREET4 1.60 (H) 03/11/2016 03:07 PM      No results found for: MICROSOMALA, ANTITHYROGL  No results found for: IGGLYMEABS  No results found for: ANTIMITOCHO, FACTIN  No results found for: IGA, TTRANSIGA, ENDOIGA  No results found for: FLTYPE, CRYSTALSBDF  Lab Results   Component Value Date/Time    ISTATICAL 1.14 01/18/2017 09:14 AM     Lab Results   Component Value Date/Time    ISTATCREAT 0.6 01/18/2017 09:14 AM     No results found for: CTELOPEP  No results found for: GBMABG  Lab Results   Component Value Date/Time    PTHINTACT 24.6 08/06/2013 04:16 PM         Medical Decision Making:  Today's Assessment / Status / Plan:     Rheumatoid Arthritis  *Controlled on methotrexate, rituximab, and prednisone 1 mg. We will continue this regimen. I did discuss with the patient today the side effects of methotrexate including but not limited to hepatotoxicity and bone marrow suppression. I also discussed the importance of close monitoring of her labs every 3 months.    Osteopenia  Continue probably injections.  Consider DEXA next visit    No diagnosis found.    No Follow-up on file.      I have spent greater than 50% of this 30 minute visit in counseling/coordination of care with the patient regarding her symptoms as well as importance of close drug monitoring with regards to her methotrexate as well as the side effects.    She agreed and verbalized her agreement and understanding with the current plan. I answered all questions and concerns she has at this time and advised her to call at any time in there interim with questions or concerns in regards to her care.    Thank you for allowing me to participate in her care, I will continue to follow closely.     Please note that this dictation was created using voice recognition software. I have made every reasonable attempt to correct obvious errors, but I expect that there are errors of grammar and possibly content that I did not discover before finalizing the note.

## 2018-01-17 ENCOUNTER — OUTPATIENT INFUSION SERVICES (OUTPATIENT)
Dept: ONCOLOGY | Facility: MEDICAL CENTER | Age: 78
End: 2018-01-17
Attending: INTERNAL MEDICINE
Payer: MEDICARE

## 2018-01-17 ENCOUNTER — APPOINTMENT (OUTPATIENT)
Dept: RHEUMATOLOGY | Facility: PHYSICIAN GROUP | Age: 78
End: 2018-01-17
Payer: MEDICARE

## 2018-01-17 VITALS
HEIGHT: 65 IN | OXYGEN SATURATION: 92 % | BODY MASS INDEX: 29.72 KG/M2 | RESPIRATION RATE: 18 BRPM | DIASTOLIC BLOOD PRESSURE: 102 MMHG | TEMPERATURE: 97.5 F | SYSTOLIC BLOOD PRESSURE: 143 MMHG | HEART RATE: 87 BPM | WEIGHT: 178.35 LBS

## 2018-01-17 DIAGNOSIS — M05.741 RHEUMATOID ARTHRITIS INVOLVING BOTH HANDS WITH POSITIVE RHEUMATOID FACTOR (HCC): ICD-10-CM

## 2018-01-17 DIAGNOSIS — M05.742 RHEUMATOID ARTHRITIS INVOLVING BOTH HANDS WITH POSITIVE RHEUMATOID FACTOR (HCC): ICD-10-CM

## 2018-01-17 LAB
ALBUMIN SERPL BCP-MCNC: 4.1 G/DL (ref 3.2–4.9)
ALBUMIN/GLOB SERPL: 1.8 G/DL
ALP SERPL-CCNC: 84 U/L (ref 30–99)
ALT SERPL-CCNC: 28 U/L (ref 2–50)
ANION GAP SERPL CALC-SCNC: 9 MMOL/L (ref 0–11.9)
AST SERPL-CCNC: 25 U/L (ref 12–45)
BASOPHILS # BLD AUTO: 0.4 % (ref 0–1.8)
BASOPHILS # BLD: 0.02 K/UL (ref 0–0.12)
BILIRUB SERPL-MCNC: 0.7 MG/DL (ref 0.1–1.5)
BUN SERPL-MCNC: 14 MG/DL (ref 8–22)
CALCIUM SERPL-MCNC: 9.3 MG/DL (ref 8.5–10.5)
CHLORIDE SERPL-SCNC: 106 MMOL/L (ref 96–112)
CO2 SERPL-SCNC: 26 MMOL/L (ref 20–33)
CREAT SERPL-MCNC: 0.84 MG/DL (ref 0.5–1.4)
EOSINOPHIL # BLD AUTO: 0.07 K/UL (ref 0–0.51)
EOSINOPHIL NFR BLD: 1.3 % (ref 0–6.9)
ERYTHROCYTE [DISTWIDTH] IN BLOOD BY AUTOMATED COUNT: 50.9 FL (ref 35.9–50)
GLOBULIN SER CALC-MCNC: 2.3 G/DL (ref 1.9–3.5)
GLUCOSE SERPL-MCNC: 104 MG/DL (ref 65–99)
HCT VFR BLD AUTO: 47.4 % (ref 37–47)
HGB BLD-MCNC: 15.8 G/DL (ref 12–16)
IMM GRANULOCYTES # BLD AUTO: 0.04 K/UL (ref 0–0.11)
IMM GRANULOCYTES NFR BLD AUTO: 0.7 % (ref 0–0.9)
LYMPHOCYTES # BLD AUTO: 1.41 K/UL (ref 1–4.8)
LYMPHOCYTES NFR BLD: 26.2 % (ref 22–41)
MCH RBC QN AUTO: 30.5 PG (ref 27–33)
MCHC RBC AUTO-ENTMCNC: 33.3 G/DL (ref 33.6–35)
MCV RBC AUTO: 91.5 FL (ref 81.4–97.8)
MONOCYTES # BLD AUTO: 0.54 K/UL (ref 0–0.85)
MONOCYTES NFR BLD AUTO: 10 % (ref 0–13.4)
NEUTROPHILS # BLD AUTO: 3.31 K/UL (ref 2–7.15)
NEUTROPHILS NFR BLD: 61.4 % (ref 44–72)
NRBC # BLD AUTO: 0 K/UL
NRBC BLD-RTO: 0 /100 WBC
PLATELET # BLD AUTO: 254 K/UL (ref 164–446)
PMV BLD AUTO: 10.8 FL (ref 9–12.9)
POTASSIUM SERPL-SCNC: 3.8 MMOL/L (ref 3.6–5.5)
PROT SERPL-MCNC: 6.4 G/DL (ref 6–8.2)
RBC # BLD AUTO: 5.18 M/UL (ref 4.2–5.4)
SODIUM SERPL-SCNC: 141 MMOL/L (ref 135–145)
WBC # BLD AUTO: 5.4 K/UL (ref 4.8–10.8)

## 2018-01-17 PROCEDURE — 96401 CHEMO ANTI-NEOPL SQ/IM: CPT | Mod: XU

## 2018-01-17 PROCEDURE — 700105 HCHG RX REV CODE 258: Performed by: INTERNAL MEDICINE

## 2018-01-17 PROCEDURE — 96415 CHEMO IV INFUSION ADDL HR: CPT

## 2018-01-17 PROCEDURE — 85025 COMPLETE CBC W/AUTO DIFF WBC: CPT

## 2018-01-17 PROCEDURE — A9270 NON-COVERED ITEM OR SERVICE: HCPCS | Performed by: INTERNAL MEDICINE

## 2018-01-17 PROCEDURE — 96413 CHEMO IV INFUSION 1 HR: CPT

## 2018-01-17 PROCEDURE — 700102 HCHG RX REV CODE 250 W/ 637 OVERRIDE(OP): Performed by: INTERNAL MEDICINE

## 2018-01-17 PROCEDURE — 700111 HCHG RX REV CODE 636 W/ 250 OVERRIDE (IP): Mod: JG | Performed by: INTERNAL MEDICINE

## 2018-01-17 PROCEDURE — 96375 TX/PRO/DX INJ NEW DRUG ADDON: CPT

## 2018-01-17 PROCEDURE — 80053 COMPREHEN METABOLIC PANEL: CPT

## 2018-01-17 RX ORDER — DIPHENHYDRAMINE HCL 25 MG
25 TABLET ORAL ONCE
Status: COMPLETED | OUTPATIENT
Start: 2018-01-17 | End: 2018-01-17

## 2018-01-17 RX ORDER — ACETAMINOPHEN 325 MG/1
650 TABLET ORAL ONCE
Status: COMPLETED | OUTPATIENT
Start: 2018-01-17 | End: 2018-01-17

## 2018-01-17 RX ORDER — METHYLPREDNISOLONE SODIUM SUCCINATE 125 MG/2ML
100 INJECTION, POWDER, LYOPHILIZED, FOR SOLUTION INTRAMUSCULAR; INTRAVENOUS ONCE
Status: COMPLETED | OUTPATIENT
Start: 2018-01-17 | End: 2018-01-17

## 2018-01-17 RX ADMIN — ACETAMINOPHEN 650 MG: 325 TABLET, FILM COATED ORAL at 10:41

## 2018-01-17 RX ADMIN — DIPHENHYDRAMINE HCL 25 MG: 25 TABLET ORAL at 10:41

## 2018-01-17 RX ADMIN — METHYLPREDNISOLONE SODIUM SUCCINATE 100 MG: 125 INJECTION, POWDER, FOR SOLUTION INTRAMUSCULAR; INTRAVENOUS at 11:02

## 2018-01-17 RX ADMIN — DENOSUMAB 60 MG: 60 INJECTION SUBCUTANEOUS at 11:22

## 2018-01-17 RX ADMIN — RITUXIMAB 1000 MG: 10 INJECTION, SOLUTION INTRAVENOUS at 11:13

## 2018-01-17 ASSESSMENT — PAIN SCALES - GENERAL: PAINLEVEL: NO PAIN

## 2018-01-17 NOTE — PROGRESS NOTES
"Pharmacy Chemotherapy Verification    Gaye Antoine  Dx: RA        Protocol: Rituximab     Rituximab 1000 mg IV Days 1 and 15  O1lmbchy  Jag HITCHCOCK, et al. N Engl J Med 2004; 350:0967-3018. Efficacy of B-Cell-Targeted Therapy with Rituximab in Patients with Rheumatoid Arthritis       Allergies: Sulfa drugs      /102   Pulse 87   Temp 36.4 °C (97.5 °F)   Resp 18   Ht 1.651 m (5' 5\")   Wt 80.9 kg (178 lb 5.6 oz)   LMP 03/01/2005   SpO2 92%   BMI 29.68 kg/m²     Body surface area is 1.93 meters squared.    Labs 1/17/18  ANC ~3300 Plt = 254k   Hgb = 15.8     SCr = 0.84 mg/dL CrCl ~72 mL/min   LFT's = WNL       TBili = 0.7   K+ = 3.8    9/28/16 Quantiferon Gold: negative  12/2/16 Hep B: negative     Drug Order   (Drug name, dose, route, IV Fluid & volume, frequency, number of doses) Cycle:  3  Day 1   (at [facility])  --delayed d/t illness 1/8/16  Previous treatment: C2D15 = 7/17/17      Medication = Rituximab  Base Dose= 1000 mg  Calc Dose: n/a  Final Dose = 1000 mg  Route = IV  Fluid & Volume =  mL  Admin Duration = per rate sheet          Fixed dose. No calulation required.  OK to treat with final dose.       By my signature below, I confirm this process was performed independently with the BSA and all final chemotherapy dosing calculations congruent. I have reviewed the above chemotherapy order and that my calculation of the final dose and BSA (when applicable) corroborate those calculations of the  pharmacist. Discrepancies of 5% or greater in the written dose have been addressed and documented within the [facility] Progress notes.    Akira GaxiolaD            "

## 2018-01-17 NOTE — PROGRESS NOTES
Chemotherapy Verification - PRIMARY RN      Height = 165.1 cm  Weight = 80.9 kg  BSA = 1.93 m2      Medication: Rituxan Dose: 1,000 mg (set dose) Calculated Dose: 1,000 mg (set dose)    Hep B  Negative on 12/2/2016                              I confirm this process was performed independently with the BSA and all final chemotherapy dosing calculations congruent.  Any discrepancies of 5% or greater have been addressed with the chemotherapy pharmacist. The resolution of the discrepancy has been documented in the EPIC progress notes.

## 2018-01-17 NOTE — PROGRESS NOTES
"Pt ambulated into department for her Q 6 month infusion of Rituxan for RA. Pt reported finishing her course of antibiotics yesterday, and that her symptoms have resolved, except for having some congestion. Pt stated that she otherwise felt fine. Pt declined having any invasive dental procedures in the last month or scheduled for next month. RN explained POC to Pt, who told nurse that she has an appointment with Dr. Islas today at 14:30. RN explained there is a chance Pt may not be able to make it to her appointment due to how long it takes for Rituxan to infuse. PIV started, blood drawn and sent to lab for analysis. Labs w/n parameters to treat. Pre-medications given as prescribed, RN waited 30 minutes from when Benadryl and Tylenol were given before starting Rituxan. RN explained to patient that she most likely wouldn't be able to make her appointment with Dr. Islas, and gave Pt Dr. Islas's number. Pt spoke with Dr. Islas's nurse about situation, and then Dr. Islas called [facility] nurse back to discuss situation. RN explained to Dr. Islas the timeline for giving Rituxan at [facility], and that Pt has stayed on schedule with this timeline, however she will not be able to make her 14:30 appointment if Rituxan is to be given as prescribed. Dr. Islas instructed RN to continue with Rituxan as ordered, and that patient will need to be rescheduled for her appointment. Dr. Islas asked for patient to go to her office after her visit at [facility] was complete to reschedule this appointment. Conversation nurse had with Dr. Islas explained to patient, who acknowledged understanding, however Pt still seem frustrated with situation. Pt told RN \"everything is just going wrong today, the TV isn't working and they aren't serving lunch here anymore.\" RN apologized for the changes, and offered patient some snacks and blankets/pillows for comfort.    Rituxan given at subsequent rate. Pt continued to appear angry/agitated at nurse. Charge nurse Daun " "reiterated the length of time it takes for this infusion when Pt has to have lab work done. Jerzy suggested that Pt come the day before her next D1 appointment to get her labs drawn  prior. Pt continued to appear frustrated. When RN was preparing to give Pt her Prolia shot patient Pt continued to look angry. RN asked Pt what was bothering her, and patient said: \"nothing, I just never want to have you again.\" RN apologized for her appointment not going as expected. Pt tolerated infusion well and without incident. PIV discontinued after, bleeding controlled with gauze and coban. Pt appeared in better spirits than when she arrived. Told RN that her plan was to go to her MD's office now to reschedule her appointment. Next appointment on 1/31/18 at 10:30 confirmed with Pt prior to departure.  "

## 2018-01-17 NOTE — PROGRESS NOTES
"Pharmacy Chemotherapy Verification    Patient Name: Gaye Antoine  Dx: Rheumatoid arthritis  Cycle: 3 at [facility], Day 1 - delayed 1 week due to a cold  Previous treatment = C2D15 7/17/17     Regimen and Dosing Reference  Rituximab 1000 mg IV Days 1 and 15  Q6 months  Jag HITCHCOCK, et al. N Engl J Med 2004; [id number]. Efficacy of B-Cell-Targeted Therapy with Rituximab in Patients with Rheumatoid Arthritis    Allergies: Sulfa drugs  /102   Pulse 87   Temp 36.4 °C (97.5 °F)   Resp 18   Ht 1.651 m (5' 5\")   Wt 80.9 kg (178 lb 5.6 oz)   LMP 03/01/2005   SpO2 92%   BMI 29.68 kg/m²  \  Body surface area is 1.93 meters squared.    Labs 1/17/17   ANC ~ 3310     Plt = 254 k    Hgb = 15.8      SCr = 0.84      CrCl ~70.8 ml/min (min SCr 0.7)    AST/ALT/AP = 25/28/84 Tbili = 0.7  Calcium = 9.3    Labs 12/2/16 12/2/2016 13:48   Hepatitis B Surface Antigen Negative   Hepatitis B Ccore Ab, Total Negative   Hepatitis C Antibody Negative     Rituximab 1000 mg IV fixed dose   No calculation required   OK to treat with final dose = 1000 mg IV     Prolia 60 mg SQ given today    Renae Hayward, PharmD, BCOP      "

## 2018-01-17 NOTE — PROGRESS NOTES
Chemotherapy Verification - SECONDARY RN       Height = 165.1 cm  Weight = 80.9 kg  BSA = 1.93 m2       Medication: Rituxan  Dose: set dose  Calculated Dose: 1000 mg                             (In mg/m2, AUC, mg/kg)       I confirm that this process was performed independently.

## 2018-01-17 NOTE — LETTER
Infusion Services   [address] [place] [id number]  Phone: [phone]  Fax: [phone]              Dear Dr. Islas ,    Your patient, Gaye Antoine (: 1940), was scheduled at [facility].  Gaye's encounter diagnosis is:  1. Rheumatoid arthritis involving both hands with positive rheumatoid factor (CMS-[facility])  CBC WITH DIFFERENTIAL    CMP    CBC WITH DIFFERENTIAL    CMP     She arrived for her appointment, and  the scheduled treatment was   given. These medications were administered to the patient: We administered diphenhydrAMINE, acetaminophen, denosumab, (riTUXimab (RITUXAN) 1,000 mg in  mL Chemo Infusion), and methylPREDNISolone sod succ..  Gaye tolerated treatment.. In addition, the following labs were drawn    Recent Results (from the past 24 hour(s))   CBC WITH DIFFERENTIAL    Collection Time: 18  9:40 AM   Result Value Ref Range    WBC 5.4 4.8 - 10.8 K/uL    RBC 5.18 4.20 - 5.40 M/uL    Hemoglobin 15.8 12.0 - 16.0 g/dL    Hematocrit 47.4 (H) 37.0 - 47.0 %    MCV 91.5 81.4 - 97.8 fL    MCH 30.5 27.0 - 33.0 pg    MCHC 33.3 (L) 33.6 - 35.0 g/dL    RDW 50.9 (H) 35.9 - 50.0 fL    Platelet Count 254 164 - 446 K/uL    MPV 10.8 9.0 - 12.9 fL    Neutrophils-Polys 61.40 44.00 - 72.00 %    Lymphocytes 26.20 22.00 - 41.00 %    Monocytes 10.00 0.00 - 13.40 %    Eosinophils 1.30 0.00 - 6.90 %    Basophils 0.40 0.00 - 1.80 %    Immature Granulocytes 0.70 0.00 - 0.90 %    Nucleated RBC 0.00 /100 WBC    Neutrophils (Absolute) 3.31 2.00 - 7.15 K/uL    Lymphs (Absolute) 1.41 1.00 - 4.80 K/uL    Monos (Absolute) 0.54 0.00 - 0.85 K/uL    Eos (Absolute) 0.07 0.00 - 0.51 K/uL    Baso (Absolute) 0.02 0.00 - 0.12 K/uL    Immature Granulocytes (abs) 0.04 0.00 - 0.11 K/uL    NRBC (Absolute) 0.00 K/uL   CMP    Collection Time: 18  9:40 AM   Result Value Ref Range    Sodium 141 135 - 145 mmol/L    Potassium 3.8 3.6 - 5.5 mmol/L    Chloride 106 96 - 112 mmol/L    Co2 26 20 -  33 mmol/L    Anion Gap 9.0 0.0 - 11.9    Glucose 104 (H) 65 - 99 mg/dL    Bun 14 8 - 22 mg/dL    Creatinine 0.84 0.50 - 1.40 mg/dL    Calcium 9.3 8.5 - 10.5 mg/dL    AST(SGOT) 25 12 - 45 U/L    ALT(SGPT) 28 2 - 50 U/L    Alkaline Phosphatase 84 30 - 99 U/L    Total Bilirubin 0.7 0.1 - 1.5 mg/dL    Albumin 4.1 3.2 - 4.9 g/dL    Total Protein 6.4 6.0 - 8.2 g/dL    Globulin 2.3 1.9 - 3.5 g/dL    A-G Ratio 1.8 g/dL   ESTIMATED GFR    Collection Time: 01/17/18  9:40 AM   Result Value Ref Range    GFR If African American >60 >60 mL/min/1.73 m 2    GFR If Non African American >60 >60 mL/min/1.73 m 2            Her next appointment is rescheduled for 1/31/2018.    For more information, you may review the nurse's progress notes in chart review under the notes section.       Sincerely,  Ginny Montague R.N.

## 2018-01-20 ASSESSMENT — ENCOUNTER SYMPTOMS
BACK PAIN: 0
FEVER: 0
CHILLS: 0
FALLS: 0

## 2018-01-21 NOTE — PROGRESS NOTES
Subjective:   Date of Consultation:1/22/2018  2:33 PM  Primary care physician:Sada Guillory M.D.      Reason for Consultation:  Ms. Antoine  is a pleasant 76 y.o. year old female who presented with  Follow-up for Rheumatoid Arthritis    Rheumatoid Arthritis   At last visit in March 2018 she was doing well.  Today she states she has been stable tor 30 years.  Her repeat RF was still positive.  She denies morning stiffness, flares, joint pains.    Current Medications  Methotrexate 6 tabs weekly q Wednesday  Folic acid  rituxan infusion 1/4/2017 and 1/18/2017  Prednisone 1 mg daily      RHEUM HISTORY  She was diagnosed in 1984 with presenting symptoms of burning feet.  At the time she denied rash.  EMG was not completed.  She states the burning of her feet were the only symptoms.  She tried GOLD and then prednisone.  Ms. Gaye Antoine reports she has tried several medications and only rituxan works.  She reports being on methotrexate x 30 years. She cannot recall the names of the medications    Osteopenia  On prolia  Last injection was 1/2017  DEXA 10/23/2012 showed osteopenia with FRAX scores that are elevated > 20% and > 3 % for 10 year risk of major fracture and hip fracture respectively  Taking calcium C and vitmain D ( takes 3 pills daily)        Past Medical History:   Diagnosis Date   • Cholelithiasis 2010   • Chronic constipation    • Chronic depressive disorder 1/19/2013   • Chronic use of steroids 1/19/2013   • CVA (cerebral infarction) 2008    L hemiparesis / ? 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, late effect of cerebrovascular disease (CMS-HCC) 1/19/2013   • Renal cyst 2010    R kidney   • Rheumatoid arthritis(714.0)    • S/P parathyroidectomy (CMS-HCC) 2012    single adenoma   • Tobacco use 1/19/2013      Past Surgical History:   Procedure Laterality Date   • PARATHYROID EXPLORATION  2/22/2012    [facility]   • HIP CANNULATED SCREW  6/15/2009    Performed by DANIELLE LANG at SURGERY [facility] ORS   • ANKLE FUSION  1/8/2009    Performed [facility] WM GONSALO HERRERA [facility]   • OTHER  2008    submaxillary gland infection   • MAMMOPLASTY REDUCTION  2006    complicated by abscess needing I&D   • APPENDECTOMY  age 7   • CHOLECYSTECTOMY     • RHYTIDECTOMY       Allergies   Allergen Reactions   • Sulfa Drugs      Not sure     Outpatient Encounter Prescriptions as of 1/22/2018   Medication Sig Dispense Refill   • predniSONE (DELTASONE) 1 MG Tab 1 mg po q day decrease to 1 mg q other day if possible 30 Tab 1   • methotrexate 2.5 MG Tab Take 6 tablets by mouth  every 7 days lab due before 4/16/2018 for refills 72 Tab 0   • SYNTHROID 175 MCG Tab TAKE 1 TABLET BY MOUTH  EVERY MORNING ON AN EMPTY  STOMACH. 10 Tab 0   • folic acid (FOLVITE) 1 MG Tab Take 1 Tab by mouth every day. 30 Tab 11   • Calcium Citrate-Vitamin D (CALCIUM + D PO) Take  by mouth.     • Omega-3 Fatty Acids (FISH OIL) 1000 MG Cap capsule Take 1,000 mg by mouth 3 times a day, with meals.     • rosuvastatin (CRESTOR) 10 MG Tab TAKE 1 TABLET BY MOUTH DAILY 90 Tab 2   • [DISCONTINUED] methotrexate 2.5 MG Tab Take 6 tablets by mouth  every 7 days lab due before 2/28/2018 for refills 78 Tab 0   • [DISCONTINUED] predniSONE (DELTASONE) 1 MG Tab Take 1 Tab by mouth every day. 30 Tab 0   • citalopram (CELEXA) 10 MG tablet Take 1 Tab by mouth every day. 90 Tab 2     No facility-administered encounter medications on file as of 1/22/2018.      Social History     Social History   • Marital status:      Spouse name: N/A   • Number of children: 1   • Years of education: some colle     Occupational History   • [employer] Retired     Social History Main Topics   • Smoking  "status: Current Some Day Smoker     Packs/day: 0.50     Years: 50.00     Types: Cigarettes   • Smokeless tobacco: Never Used      Comment: Previous heavy 1 ppd smoker.    • Alcohol use 0.0 oz/week      Comment: rare   • Drug use: No   • Sexual activity: Not Currently      Comment:       Other Topics Concern   • Not on file     Social History Narrative    Retired  for [employer].         49 yo son.    No grandchildren.    Sister with PCKD may have been 1/2 sister (family secret)      History   Smoking Status   • Current Some Day Smoker   • Packs/day: 0.50   • Years: 50.00   • Types: Cigarettes   Smokeless Tobacco   • Never Used     Comment: Previous heavy 1 ppd smoker.      History   Alcohol Use   • 0.0 oz/week     Comment: rare     History   Drug Use No      OB History    Para Term  AB Living   1 1           SAB TAB Ectopic Molar Multiple Live Births                    # Outcome Date GA Lbr Khanh/2nd Weight Sex Delivery Anes PTL Lv   1 Para                  Patient's last menstrual period was 2005.    G P A L     Family History   Problem Relation Age of Onset   • Genitourinary () Sister      polycystic kidneys   • Cancer Mother      breast   • Lung Disease Mother      TB   • Heart Disease Father      aneurysm       Review of Systems   Constitutional: Negative for chills and fever.   Musculoskeletal: Negative for back pain, falls and joint pain.        Objective:   /74   Pulse 72   Temp 36.3 °C (97.4 °F)   Resp 14   Ht 1.651 m (5' 5\")   Wt 81.2 kg (179 lb)   LMP 2005   SpO2 94%   BMI 29.79 kg/m²     Physical Exam   Constitutional: She is oriented to person, place, and time. She appears well-developed and well-nourished. No distress.   HENT:   Head: Normocephalic and atraumatic.   Right Ear: External ear normal.   Left Ear: External ear normal.   Eyes: Conjunctivae are normal. Right eye exhibits no discharge. Left eye exhibits no " discharge. No scleral icterus.   Cardiovascular: Normal rate, regular rhythm and normal heart sounds.    Pulmonary/Chest: No stridor. No respiratory distress. She has no wheezes.   Abdominal: Soft. She exhibits no distension. There is no tenderness.   No hepatosplenomegaly   Musculoskeletal: She exhibits no edema.   Left ankle swelling which she states is chronic. No synovitis appreciated in MCPs, PIPs or DIPs. No synovitis she and the wrists bilateral.   Neurological: She is alert and oriented to person, place, and time.   Skin: She is not diaphoretic.   Over the area of the right press there is an mild area of erythema with no fluctuation or induration appreciated   Psychiatric: She has a normal mood and affect. Her behavior is normal. Judgment and thought content normal.       Assessment:     1. Rheumatoid arthritis involving both hands with positive rheumatoid factor (CMS-[facility])  methotrexate 2.5 MG Tab    CBC WITH DIFFERENTIAL    COMP METABOLIC PANEL    CRP QUANTITIVE (NON-CARDIAC)    WESTERGREN SED RATE   2. Encounter for long-term (current) use of high-risk medication  CBC WITH DIFFERENTIAL    COMP METABOLIC PANEL    CRP QUANTITIVE (NON-CARDIAC)    WESTERGREN SED RATE     Labs:  Scooby last injection  Lab Results   Component Value Date/Time    QNTTBGOLD Negative 09/28/2016 12:19 PM     Lab Results   Component Value Date/Time    HEPBCORTOT Negative 12/02/2016 01:48 PM    HEPBSAG Negative 12/02/2016 01:48 PM     Lab Results   Component Value Date/Time    HEPCAB Negative 12/02/2016 01:48 PM     Lab Results   Component Value Date/Time    SODIUM 141 01/17/2018 09:40 AM    POTASSIUM 3.8 01/17/2018 09:40 AM    CHLORIDE 106 01/17/2018 09:40 AM    CO2 26 01/17/2018 09:40 AM    GLUCOSE 104 (H) 01/17/2018 09:40 AM    BUN 14 01/17/2018 09:40 AM    CREATININE 0.84 01/17/2018 09:40 AM    CREATININE 0.8 12/31/2008 02:01 PM      Lab Results   Component Value Date/Time    WBC 5.4 01/17/2018 09:40 AM    RBC 5.18 01/17/2018 09:40  AM    HEMOGLOBIN 15.8 01/17/2018 09:40 AM    HEMATOCRIT 47.4 (H) 01/17/2018 09:40 AM    MCV 91.5 01/17/2018 09:40 AM    MCH 30.5 01/17/2018 09:40 AM    MCHC 33.3 (L) 01/17/2018 09:40 AM    MPV 10.8 01/17/2018 09:40 AM    NEUTSPOLYS 61.40 01/17/2018 09:40 AM    LYMPHOCYTES 26.20 01/17/2018 09:40 AM    MONOCYTES 10.00 01/17/2018 09:40 AM    EOSINOPHILS 1.30 01/17/2018 09:40 AM    BASOPHILS 0.40 01/17/2018 09:40 AM    HYPOCHROMIA 1+ 05/09/2014 12:01 PM    ANISOCYTOSIS 1+ 06/10/2008 05:00 AM      Lab Results   Component Value Date/Time    CALCIUM 9.3 01/17/2018 09:40 AM    ASTSGOT 25 01/17/2018 09:40 AM    ALTSGPT 28 01/17/2018 09:40 AM    ALKPHOSPHAT 84 01/17/2018 09:40 AM    TBILIRUBIN 0.7 01/17/2018 09:40 AM    ALBUMIN 4.1 01/17/2018 09:40 AM    TOTPROTEIN 6.4 01/17/2018 09:40 AM     Lab Results   Component Value Date/Time    RHEUMFACTN >360 (H) 11/28/2017 02:21 PM    CCPANTIBODY 106 (H) 09/28/2016 12:19 PM    ANTINUCAB NONE DET 06/12/2008 06:15 AM     Lab Results   Component Value Date/Time    SEDRATEWES 6 11/28/2017 02:25 PM    CREACTPROT 0.34 11/28/2017 02:25 PM     No results found for: RUSSELVIPER, DRVVTINTERP  No results found for: Y9SIDPBRLUY, D5LBWOFKWEG, IGGCARDIOLI, IGMCARDIOLI, IGACARDIOLI, CRYOGLOBULIN  Lab Results   Component Value Date/Time    ANTIDNADS None Detected 09/28/2016 12:19 PM    RNPAB 0 09/28/2016 12:19 PM    SMITHAB 0 09/28/2016 12:19 PM    JBJZBPF04 0 09/28/2016 12:19 PM    CENTROMBAB 2 09/28/2016 12:19 PM     Lab Results   Component Value Date/Time    ANTIDNADS None Detected 09/28/2016 12:19 PM    RNPAB 0 09/28/2016 12:19 PM    ANTISSBSJ 0 09/28/2016 12:19 PM     Lab Results   Component Value Date/Time    COLORURINE Lt. Yellow 05/19/2015 02:30 PM    SPECGRAVITY 1.015 05/19/2015 02:30 PM    PHURINE 5.5 05/19/2015 02:30 PM    GLUCOSEUR Negative 05/19/2015 02:30 PM    KETONES Negative 05/19/2015 02:30 PM    PROTEINURIN Negative 05/19/2015 02:30 PM     Lab Results   Component Value Date/Time     TOTALVOLUME 1500 12/21/2011 12:00 AM     Lab Results   Component Value Date/Time    SSA60 1 09/28/2016 12:19 PM    SSA52 4 09/28/2016 12:19 PM     Lab Results   Component Value Date/Time    HBA1C 5.5 05/09/2014 11:59 AM     Lab Results   Component Value Date/Time    CPKTOTAL 69 09/28/2016 12:19 PM     No results found for: G6PD  No results found for: CUEJ99PIRW  No results found for: ACESERUM  Lab Results   Component Value Date/Time    25HYDROXY 25 (L) 05/09/2014 11:59 AM     No results found for: TSH, FREEDIR  Lab Results   Component Value Date/Time    TSHULTRASEN 1.630 03/11/2016 03:07 PM    FREET4 1.60 (H) 03/11/2016 03:07 PM     No results found for: MICROSOMALA, ANTITHYROGL  No results found for: IGGLYMEABS  No results found for: ANTIMITOCHO, FACTIN  No results found for: IGA, TTRANSIGA, ENDOIGA  No results found for: FLTYPE, CRYSTALSBDF  Lab Results   Component Value Date/Time    ISTATICAL 1.14 01/18/2017 09:14 AM     Lab Results   Component Value Date/Time    ISTATCREAT 0.6 01/18/2017 09:14 AM     No results found for: CTELOPEP  No results found for: GBMABG  Lab Results   Component Value Date/Time    PTHINTACT 24.6 08/06/2013 04:16 PM         Medical Decision Making:  Today's Assessment / Status / Plan:     Rheumatoid Arthritis, seropositive  Controlled on methotrexate, rituximab, and prednisone 1 mg. She has been stable (per patient > 30 years).  We will try to taper prednisone 1 mg q other day to see if she tolerates this . I discussed geovanna tif she does for 3 weeks we will stop prednisone.  For her methotrexate we will try to taper slowly.  First we will try methotrexate 5 tabs and 6 tabs alternating q other week.  If she does not tolerated she has been instructed to increase back to 6 tabs weekly.    Osteopenia  Continue probably injections.  Consider DEXA next visit    1. Rheumatoid arthritis involving both hands with positive rheumatoid factor (CMS-[facility])  methotrexate 2.5 MG Tab    CBC WITH DIFFERENTIAL     COMP METABOLIC PANEL    CRP QUANTITIVE (NON-CARDIAC)    WESTERGREN SED RATE   2. Encounter for long-term (current) use of high-risk medication  CBC WITH DIFFERENTIAL    COMP METABOLIC PANEL    CRP QUANTITIVE (NON-CARDIAC)    WESTERGREN SED RATE       Return in about 3 months (around 4/22/2018).      I have spent greater than 50% of this 30 minute visit in counseling/coordination of care with the patient regarding her therapy plan    She agreed and verbalized her agreement and understanding with the current plan. I answered all questions and concerns she has at this time and advised her to call at any time in there interim with questions or concerns in regards to her care.    Thank you for allowing me to participate in her care, I will continue to follow closely.     Please note that this dictation was created using voice recognition software. I have made every reasonable attempt to correct obvious errors, but I expect that there are errors of grammar and possibly content that I did not discover before finalizing the note.

## 2018-01-22 ENCOUNTER — OFFICE VISIT (OUTPATIENT)
Dept: RHEUMATOLOGY | Facility: PHYSICIAN GROUP | Age: 78
End: 2018-01-22
Payer: MEDICARE

## 2018-01-22 VITALS
HEART RATE: 72 BPM | BODY MASS INDEX: 29.82 KG/M2 | OXYGEN SATURATION: 94 % | HEIGHT: 65 IN | WEIGHT: 179 LBS | DIASTOLIC BLOOD PRESSURE: 74 MMHG | SYSTOLIC BLOOD PRESSURE: 136 MMHG | RESPIRATION RATE: 14 BRPM | TEMPERATURE: 97.4 F

## 2018-01-22 DIAGNOSIS — M05.742 RHEUMATOID ARTHRITIS INVOLVING BOTH HANDS WITH POSITIVE RHEUMATOID FACTOR (HCC): ICD-10-CM

## 2018-01-22 DIAGNOSIS — Z79.899 ENCOUNTER FOR LONG-TERM (CURRENT) USE OF HIGH-RISK MEDICATION: ICD-10-CM

## 2018-01-22 DIAGNOSIS — M05.741 RHEUMATOID ARTHRITIS INVOLVING BOTH HANDS WITH POSITIVE RHEUMATOID FACTOR (HCC): ICD-10-CM

## 2018-01-22 PROCEDURE — 99214 OFFICE O/P EST MOD 30 MIN: CPT | Performed by: INTERNAL MEDICINE

## 2018-01-22 RX ORDER — PREDNISONE 1 MG/1
TABLET ORAL
Qty: 30 TAB | Refills: 1 | Status: SHIPPED | OUTPATIENT
Start: 2018-01-22 | End: 2018-08-01

## 2018-01-22 NOTE — LETTER
[facility]   [address], [address]  [place] [address]  Phone: [phone]  Fax: [phone]              Encounter Date: 1/22/2018    Dear Dr. Guillory,    It was a pleasure seeing your patient, Gaye Antoine, on 1/22/2018. Diagnoses of Rheumatoid arthritis involving both hands with positive rheumatoid factor (CMS-[facility]) and Encounter for long-term (current) use of high-risk medication were pertinent to this visit.     Please find attached progress note which includes the history I obtained from Ms. Antoine, my physical examination findings, my impression and recommendations.      Once again, it was a pleasure participating in your patient's care.  Please feel free to contact me if you have any questions or if I can be of any further assistance to your patients.      Sincerely,    Sophie Islas M.D.  Electronically Signed          PROGRESS NOTE:  Subjective:   Date of Consultation:1/22/2018  2:33 PM  Primary care physician:Sada Guillory M.D.      Reason for Consultation:  Ms. Antoine  is a pleasant 76 y.o. year old female who presented with  Follow-up for Rheumatoid Arthritis    Rheumatoid Arthritis   At last visit in March 2018 she was doing well.  Today she states she has been stable tor 30 years.  Her repeat RF was still positive.  She denies morning stiffness, flares, joint pains.    Current Medications  Methotrexate 6 tabs weekly q Wednesday  Folic acid  rituxan infusion 1/4/2017 and 1/18/2017  Prednisone 1 mg daily      RHEUM HISTORY  She was diagnosed in 1984 with presenting symptoms of burning feet.  At the time she denied rash.  EMG was not completed.  She states the burning of her feet were the only symptoms.  She tried GOLD and then prednisone.  Ms. Gaye Antoine reports she has tried several medications and only rituxan works.  She reports being on methotrexate x 30 years. She cannot recall the names of the medications    Osteopenia  On prolia  Last injection was  1/2017  DEXA 10/23/2012 showed osteopenia with FRAX scores that are elevated > 20% and > 3 % for 10 year risk of major fracture and hip fracture respectively  Taking calcium C and vitmain D ( takes 3 pills daily)        Past Medical History:   Diagnosis Date   • Cholelithiasis 2010   • Chronic constipation    • Chronic depressive disorder 1/19/2013   • Chronic use of steroids 1/19/2013   • CVA (cerebral infarction) 2008    L hemiparesis / ? 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, late effect of cerebrovascular disease (CMS-HCC) 1/19/2013   • Renal cyst 2010    R kidney   • Rheumatoid arthritis(714.0)    • S/P parathyroidectomy (CMS-HCC) 2012    single adenoma   • Tobacco use 1/19/2013     Past Surgical History:   Procedure Laterality Date   • PARATHYROID EXPLORATION  2/22/2012    Performed by CURTIS DUPONT at [facility]   • HIP CANNULATED SCREW  6/15/2009    Performed by DANIELLE LANG at [facility]   • ANKLE FUSION  1/8/2009    Performed by WM GONSALO HERRERA at [facility]   • OTHER  2008    submaxillary gland infection   • MAMMOPLASTY REDUCTION  2006    complicated by abscess needing I&D   • APPENDECTOMY  age 7   • CHOLECYSTECTOMY     • RHYTIDECTOMY       Allergies   Allergen Reactions   • Sulfa Drugs      Not sure     Outpatient Encounter Prescriptions as of 1/22/2018   Medication Sig Dispense Refill   • predniSONE (DELTASONE) 1 MG Tab 1 mg po q day decrease to 1 mg q other day if possible 30 Tab 1   • methotrexate 2.5 MG Tab Take 6 tablets by mouth  every 7 days lab due before 4/16/2018 for refills 72 Tab 0   • SYNTHROID 175 MCG Tab TAKE 1 TABLET BY MOUTH  EVERY MORNING ON AN EMPTY  STOMACH. 10 Tab 0   • folic acid (FOLVITE) 1 MG Tab Take 1 Tab by mouth every day.  30 Tab 11   • Calcium Citrate-Vitamin D (CALCIUM + D PO) Take  by mouth.     • Omega-3 Fatty Acids (FISH OIL) 1000 MG Cap capsule Take 1,000 mg by mouth 3 times a day, with meals.     • rosuvastatin (CRESTOR) 10 MG Tab TAKE 1 TABLET BY MOUTH DAILY 90 Tab 2   • [DISCONTINUED] methotrexate 2.5 MG Tab Take 6 tablets by mouth  every 7 days lab due before 2018 for refills 78 Tab 0   • [DISCONTINUED] predniSONE (DELTASONE) 1 MG Tab Take 1 Tab by mouth every day. 30 Tab 0   • citalopram (CELEXA) 10 MG tablet Take 1 Tab by mouth every day. 90 Tab 2     No facility-administered encounter medications on file as of 2018.      Social History     Social History   • Marital status:      Spouse name: N/A   • Number of children: 1   • Years of education: some colle     Occupational History   • [employer] Retired     Social History Main Topics   • Smoking status: Current Some Day Smoker     Packs/day: 0.50     Years: 50.00     Types: Cigarettes   • Smokeless tobacco: Never Used      Comment: Previous heavy 1 ppd smoker.    • Alcohol use 0.0 oz/week      Comment: rare   • Drug use: No   • Sexual activity: Not Currently      Comment:       Other Topics Concern   • Not on file     Social History Narrative    Retired  for [employer].         51 yo son.    No grandchildren.    Sister with PCKD may have been 1/2 sister (family secret)      History   Smoking Status   • Current Some Day Smoker   • Packs/day: 0.50   • Years: 50.00   • Types: Cigarettes   Smokeless Tobacco   • Never Used     Comment: Previous heavy 1 ppd smoker.      History   Alcohol Use   • 0.0 oz/week     Comment: rare     History   Drug Use No      OB History    Para Term  AB Living   1 1           SAB TAB Ectopic Molar Multiple Live Births                    # Outcome Date GA Lbr Khanh/2nd Weight Sex Delivery Anes PTL Lv   1 Para                  Patient's last menstrual period was  "03/01/2005.    NIYA ANTON DARION     Family History   Problem Relation Age of Onset   • Genitourinary () Sister      polycystic kidneys   • Cancer Mother      breast   • Lung Disease Mother      TB   • Heart Disease Father      aneurysm       Review of Systems   Constitutional: Negative for chills and fever.   Musculoskeletal: Negative for back pain, falls and joint pain.        Objective:   /74   Pulse 72   Temp 36.3 °C (97.4 °F)   Resp 14   Ht 1.651 m (5' 5\")   Wt 81.2 kg (179 lb)   LMP 03/01/2005   SpO2 94%   BMI 29.79 kg/m²      Physical Exam   Constitutional: She is oriented to person, place, and time. She appears well-developed and well-nourished. No distress.   HENT:   Head: Normocephalic and atraumatic.   Right Ear: External ear normal.   Left Ear: External ear normal.   Eyes: Conjunctivae are normal. Right eye exhibits no discharge. Left eye exhibits no discharge. No scleral icterus.   Cardiovascular: Normal rate, regular rhythm and normal heart sounds.    Pulmonary/Chest: No stridor. No respiratory distress. She has no wheezes.   Abdominal: Soft. She exhibits no distension. There is no tenderness.   No hepatosplenomegaly   Musculoskeletal: She exhibits no edema.   Left ankle swelling which she states is chronic. No synovitis appreciated in MCPs, PIPs or DIPs. No synovitis she and the wrists bilateral.   Neurological: She is alert and oriented to person, place, and time.   Skin: She is not diaphoretic.   Over the area of the right press there is an mild area of erythema with no fluctuation or induration appreciated   Psychiatric: She has a normal mood and affect. Her behavior is normal. Judgment and thought content normal.       Assessment:     1. Rheumatoid arthritis involving both hands with positive rheumatoid factor (CMS-[facility])  methotrexate 2.5 MG Tab    CBC WITH DIFFERENTIAL    COMP METABOLIC PANEL    CRP QUANTITIVE (NON-CARDIAC)    WESTERGREN SED RATE   2. Encounter for long-term (current) use of " high-risk medication  CBC WITH DIFFERENTIAL    COMP METABOLIC PANEL    CRP QUANTITIVE (NON-CARDIAC)    WESTERGREN SED RATE     Labs:  Scooby last injection  Lab Results   Component Value Date/Time    QNTTBGOLD Negative 09/28/2016 12:19 PM     Lab Results   Component Value Date/Time    HEPBCORTOT Negative 12/02/2016 01:48 PM    HEPBSAG Negative 12/02/2016 01:48 PM     Lab Results   Component Value Date/Time    HEPCAB Negative 12/02/2016 01:48 PM     Lab Results   Component Value Date/Time    SODIUM 141 01/17/2018 09:40 AM    POTASSIUM 3.8 01/17/2018 09:40 AM    CHLORIDE 106 01/17/2018 09:40 AM    CO2 26 01/17/2018 09:40 AM    GLUCOSE 104 (H) 01/17/2018 09:40 AM    BUN 14 01/17/2018 09:40 AM    CREATININE 0.84 01/17/2018 09:40 AM    CREATININE 0.8 12/31/2008 02:01 PM      Lab Results   Component Value Date/Time    WBC 5.4 01/17/2018 09:40 AM    RBC 5.18 01/17/2018 09:40 AM    HEMOGLOBIN 15.8 01/17/2018 09:40 AM    HEMATOCRIT 47.4 (H) 01/17/2018 09:40 AM    MCV 91.5 01/17/2018 09:40 AM    MCH 30.5 01/17/2018 09:40 AM    MCHC 33.3 (L) 01/17/2018 09:40 AM    MPV 10.8 01/17/2018 09:40 AM    NEUTSPOLYS 61.40 01/17/2018 09:40 AM    LYMPHOCYTES 26.20 01/17/2018 09:40 AM    MONOCYTES 10.00 01/17/2018 09:40 AM    EOSINOPHILS 1.30 01/17/2018 09:40 AM    BASOPHILS 0.40 01/17/2018 09:40 AM    HYPOCHROMIA 1+ 05/09/2014 12:01 PM    ANISOCYTOSIS 1+ 06/10/2008 05:00 AM      Lab Results   Component Value Date/Time    CALCIUM 9.3 01/17/2018 09:40 AM    ASTSGOT 25 01/17/2018 09:40 AM    ALTSGPT 28 01/17/2018 09:40 AM    ALKPHOSPHAT 84 01/17/2018 09:40 AM    TBILIRUBIN 0.7 01/17/2018 09:40 AM    ALBUMIN 4.1 01/17/2018 09:40 AM    TOTPROTEIN 6.4 01/17/2018 09:40 AM     Lab Results   Component Value Date/Time    RHEUMFACTN >360 (H) 11/28/2017 02:21 PM    CCPANTIBODY 106 (H) 09/28/2016 12:19 PM    ANTINUCAB NONE DET 06/12/2008 06:15 AM     Lab Results   Component Value Date/Time    SEDRATEWES 6 11/28/2017 02:25 PM    CREACTPROT 0.34  11/28/2017 02:25 PM     No results found for: RUSSELVIPER, DRVVTINTERP  No results found for: Z3NPKYKAKWK, O6PEZQFZEZF, IGGCARDIOLI, IGMCARDIOLI, IGACARDIOLI, CRYOGLOBULIN  Lab Results   Component Value Date/Time    ANTIDNADS None Detected 09/28/2016 12:19 PM    RNPAB 0 09/28/2016 12:19 PM    SMITHAB 0 09/28/2016 12:19 PM    JSGUCGS36 0 09/28/2016 12:19 PM    CENTROMBAB 2 09/28/2016 12:19 PM     Lab Results   Component Value Date/Time    ANTIDNADS None Detected 09/28/2016 12:19 PM    RNPAB 0 09/28/2016 12:19 PM    ANTISSBSJ 0 09/28/2016 12:19 PM     Lab Results   Component Value Date/Time    COLORURINE Lt. Yellow 05/19/2015 02:30 PM    SPECGRAVITY 1.015 05/19/2015 02:30 PM    PHURINE 5.5 05/19/2015 02:30 PM    GLUCOSEUR Negative 05/19/2015 02:30 PM    KETONES Negative 05/19/2015 02:30 PM    PROTEINURIN Negative 05/19/2015 02:30 PM     Lab Results   Component Value Date/Time    TOTALVOLUME 1500 12/21/2011 12:00 AM     Lab Results   Component Value Date/Time    SSA60 1 09/28/2016 12:19 PM    SSA52 4 09/28/2016 12:19 PM     Lab Results   Component Value Date/Time    HBA1C 5.5 05/09/2014 11:59 AM     Lab Results   Component Value Date/Time    CPKTOTAL 69 09/28/2016 12:19 PM     No results found for: G6PD  No results found for: [id number]  No results found for: ACESERUM  Lab Results   Component Value Date/Time    25HYDROXY 25 (L) 05/09/2014 11:59 AM     No results found for: TSH, FREEDIR  Lab Results   Component Value Date/Time    TSHULTRASEN 1.630 03/11/2016 03:07 PM    FREET4 1.60 (H) 03/11/2016 03:07 PM     No results found for: MICROSOMALA, ANTITHYROGL  No results found for: IGGLYMEABS  No results found for: ANTIMITOCHO, FACTIN  No results found for: IGA, TTRANSIGA, ENDOIGA  No results found for: FLTYPE, CRYSTALSBDF  Lab Results   Component Value Date/Time    ISTATICAL 1.14 01/18/2017 09:14 AM     Lab Results   Component Value Date/Time    ISTATCREAT 0.6 01/18/2017 09:14 AM     No results found for: CTELOPEP  No  results found for: GBMABG  Lab Results   Component Value Date/Time    PTHINTACT 24.6 08/06/2013 04:16 PM         Medical Decision Making:  Today's Assessment / Status / Plan:     Rheumatoid Arthritis, seropositive  Controlled on methotrexate, rituximab, and prednisone 1 mg. She has been stable (per patient > 30 years).  We will try to taper prednisone 1 mg q other day to see if she tolerates this . I discussed geovanna tif she does for 3 weeks we will stop prednisone.  For her methotrexate we will try to taper slowly.  First we will try methotrexate 5 tabs and 6 tabs alternating q other week.  If she does not tolerated she has been instructed to increase back to 6 tabs weekly.    Osteopenia  Continue probably injections.  Consider DEXA next visit    1. Rheumatoid arthritis involving both hands with positive rheumatoid factor (CMS-[facility])  methotrexate 2.5 MG Tab    CBC WITH DIFFERENTIAL    COMP METABOLIC PANEL    CRP QUANTITIVE (NON-CARDIAC)    WESTERGREN SED RATE   2. Encounter for long-term (current) use of high-risk medication  CBC WITH DIFFERENTIAL    COMP METABOLIC PANEL    CRP QUANTITIVE (NON-CARDIAC)    WESTERGREN SED RATE       Return in about 3 months (around 4/22/2018).      I have spent greater than 50% of this 30 minute visit in counseling/coordination of care with the patient regarding her therapy plan    She agreed and verbalized her agreement and understanding with the current plan. I answered all questions and concerns she has at this time and advised her to call at any time in there interim with questions or concerns in regards to her care.    Thank you for allowing me to participate in her care, I will continue to follow closely.     Please note that this dictation was created using voice recognition software. I have made every reasonable attempt to correct obvious errors, but I expect that there are errors of grammar and possibly content that I did not discover before finalizing the note.

## 2018-01-23 DIAGNOSIS — E78.2 MIXED HYPERLIPIDEMIA: ICD-10-CM

## 2018-01-26 ENCOUNTER — TELEPHONE (OUTPATIENT)
Dept: MEDICAL GROUP | Age: 78
End: 2018-01-26

## 2018-01-26 NOTE — TELEPHONE ENCOUNTER
ESTABLISHED PATIENT PRE-VISIT PLANNING     Note: Patient will not be contacted if there is no indication to call.     1.  Reviewed notes from the last few office visits within the medical group: Yes    2.  If any orders were placed at last visit or intended to be done for this visit (i.e. 6 mos follow-up), do we have Results/Consult Notes?        •  Labs - Labs were not ordered at last office visit.   Note: If patient appointment is for lab review and patient did not complete labs, check with provider if OK to reschedule patient until labs completed.       •  Imaging - Imaging was not ordered at last office visit.       •  Referrals - No referrals were ordered at last office visit.    3. Is this appointment scheduled as a Hospital Follow-Up? No    4.  Immunizations were updated in Epic using WebIZ?: Epic matches WebIZ       •  Web Iz Recommendations: FLU, PNEUMOVAX (PPSV23), TDAP and ZOSTAVAX (Shingles)    5.  Patient is due for the following Health Maintenance Topics:   Health Maintenance Due   Topic Date Due   • Annual Wellness Visit  1940   • IMM DTaP/Tdap/Td Vaccine (1 - Tdap) 05/11/1959   • IMM ZOSTER VACCINE  05/11/2000   • IMM INFLUENZA (1) 09/01/2017   • BONE DENSITY  10/23/2017   • IMM PNEUMOCOCCAL 65+ (ADULT) LOW/MEDIUM RISK SERIES (2 of 2 - PPSV23) 11/18/2017           6.  Patient was NOT informed to arrive 15 min prior to their scheduled appointment and bring in their medication bottles.

## 2018-01-30 RX ORDER — ROSUVASTATIN CALCIUM 10 MG/1
TABLET, COATED ORAL
Qty: 90 TAB | Refills: 0 | Status: SHIPPED | OUTPATIENT
Start: 2018-01-30 | End: 2021-03-25

## 2018-01-30 NOTE — TELEPHONE ENCOUNTER
Was the patient seen in the last year in this department? Yes     Does patient have an active prescription for medications requested? No     Received Request Via: Pharmacy       Future Appointments       Provider Department [place]           2/6/2018 11:00 AM Kvng Zabala M.D. [facility] [address]

## 2018-01-31 ENCOUNTER — OUTPATIENT INFUSION SERVICES (OUTPATIENT)
Dept: ONCOLOGY | Facility: MEDICAL CENTER | Age: 78
End: 2018-01-31
Attending: INTERNAL MEDICINE
Payer: MEDICARE

## 2018-01-31 VITALS
RESPIRATION RATE: 18 BRPM | OXYGEN SATURATION: 91 % | TEMPERATURE: 98.4 F | HEART RATE: 73 BPM | SYSTOLIC BLOOD PRESSURE: 137 MMHG | WEIGHT: 177.69 LBS | DIASTOLIC BLOOD PRESSURE: 93 MMHG | BODY MASS INDEX: 29.61 KG/M2 | HEIGHT: 65 IN

## 2018-01-31 PROCEDURE — A9270 NON-COVERED ITEM OR SERVICE: HCPCS | Performed by: INTERNAL MEDICINE

## 2018-01-31 PROCEDURE — 700105 HCHG RX REV CODE 258: Performed by: INTERNAL MEDICINE

## 2018-01-31 PROCEDURE — 700102 HCHG RX REV CODE 250 W/ 637 OVERRIDE(OP): Performed by: INTERNAL MEDICINE

## 2018-01-31 PROCEDURE — 700111 HCHG RX REV CODE 636 W/ 250 OVERRIDE (IP): Performed by: INTERNAL MEDICINE

## 2018-01-31 PROCEDURE — 96415 CHEMO IV INFUSION ADDL HR: CPT

## 2018-01-31 PROCEDURE — 96375 TX/PRO/DX INJ NEW DRUG ADDON: CPT

## 2018-01-31 PROCEDURE — 96413 CHEMO IV INFUSION 1 HR: CPT

## 2018-01-31 RX ORDER — METHYLPREDNISOLONE SODIUM SUCCINATE 125 MG/2ML
100 INJECTION, POWDER, LYOPHILIZED, FOR SOLUTION INTRAMUSCULAR; INTRAVENOUS ONCE
Status: COMPLETED | OUTPATIENT
Start: 2018-01-31 | End: 2018-01-31

## 2018-01-31 RX ORDER — ACETAMINOPHEN 325 MG/1
650 TABLET ORAL ONCE
Status: COMPLETED | OUTPATIENT
Start: 2018-01-31 | End: 2018-01-31

## 2018-01-31 RX ORDER — DIPHENHYDRAMINE HCL 25 MG
25 TABLET ORAL ONCE
Status: COMPLETED | OUTPATIENT
Start: 2018-01-31 | End: 2018-01-31

## 2018-01-31 RX ADMIN — METHYLPREDNISOLONE SODIUM SUCCINATE 100 MG: 125 INJECTION, POWDER, FOR SOLUTION INTRAMUSCULAR; INTRAVENOUS at 11:22

## 2018-01-31 RX ADMIN — RITUXIMAB 1000 MG: 10 INJECTION, SOLUTION INTRAVENOUS at 11:58

## 2018-01-31 RX ADMIN — ACETAMINOPHEN 650 MG: 325 TABLET, FILM COATED ORAL at 11:21

## 2018-01-31 RX ADMIN — DIPHENHYDRAMINE HCL 25 MG: 25 TABLET ORAL at 11:21

## 2018-01-31 ASSESSMENT — PAIN SCALES - GENERAL: PAINLEVEL: NO PAIN

## 2018-01-31 NOTE — PROGRESS NOTES
"Pharmacy Chemotherapy Verification    Gaye Antoine  Dx: RA        Protocol: Rituximab     Rituximab 1000 mg IV Days 1 and 15  [id number]  Jag HITCHCOCK, et al. N Engl J Med 2004; [id number]. Efficacy of B-Cell-Targeted Therapy with Rituximab in Patients with Rheumatoid Arthritis       Allergies: Sulfa drugs      /93   Pulse 73   Temp 36.9 °C (98.4 °F)   Resp 18   Ht 1.651 m (5' 5\")   Wt 80.6 kg (177 lb 11.1 oz)   LMP 03/01/2005   SpO2 91%   BMI 29.57 kg/m²     Body surface area is 1.92 meters squared.    LABS 1/17/18  ANC ~3300 Plt = 254k   Hgb = 15.8     SCr = 0.84 mg/dL CrCl ~72 mL/min   LFT's = WNL       TBili = 0.7   K+ = 3.8    9/28/16 Quantiferon Gold: negative  12/2/16 Hep B: negative     Drug Order   (Drug name, dose, route, IV Fluid & volume, frequency, number of doses) Cycle:  3  Day 15       Previous treatment: C3D1 = 1/17/2018     Medication = Rituximab  Base Dose= 1000 mg  Calc Dose: n/a  Final Dose = 1000 mg  Route = IV  Fluid & Volume =  mL  Admin Duration = per rate sheet          Fixed dose. No calulation required.  OK to treat with final dose.       By my signature below, I confirm this process was performed independently with the BSA and all final chemotherapy dosing calculations congruent. I have reviewed the above chemotherapy order and that my calculation of the final dose and BSA (when applicable) corroborate those calculations of the  pharmacist. Discrepancies of 5% or greater in the written dose have been addressed and documented within the [facility] Progress notes.    BROOKS Fernandez, PharmD            "

## 2018-01-31 NOTE — PROGRESS NOTES
[facility] Day [facility]/ Cycle 3 of Rituxan for RA. Pt denied having any new or acute complaints today, reports tolerating past treatments well. PIV started, had + blood return flushed briskly.   Premeds given and math calculated for proper dosing.  Pt given Rituxan as prescribed, tolerated well, denied having any complaints during or after infusion. PIV discontinued, bleeding controlled with gauze and coban.  Patient left ambulatory, scheduled to return on 7/18/18.

## 2018-01-31 NOTE — PROGRESS NOTES
Chemotherapy Verification - SECONDARY RN       Height = 65in  Weight = 80.6kg  BSA = 1.92m2       Medication: Rituxan  Dose: 1000mg  Calculated Dose: 1000mg SET DOSE for RA                             (In mg/m2, AUC, mg/kg)       12/2/17 Hep B negative      I confirm that this process was performed independently.

## 2018-01-31 NOTE — PROGRESS NOTES
"Pharmacy Chemotherapy Verification    Patient Name: Gaye Antoine  Dx: Rheumatoid arthritis  Cycle: 3 at [facility], Day 15   Previous treatment = C3D1 1/17/18    Regimen and Dosing Reference  Rituximab 1000 mg IV Days 1 and 15  Q6 months  Jag HITCHCOCK, et al. N Engl J Med 2004; [id number]. Efficacy of B-Cell-Targeted Therapy with Rituximab in Patients with Rheumatoid Arthritis    Allergies: Sulfa drugs  /93   Pulse 73   Temp 36.9 °C (98.4 °F)   Resp 18   Ht 1.651 m (5' 5\")   Wt 80.6 kg (177 lb 11.1 oz)   LMP 03/01/2005   SpO2 91%   BMI 29.57 kg/m²   Body surface area is 1.92 meters squared.    Labs 1/17/17   ANC ~ 3310     Plt = 254 k    Hgb = 15.8      SCr = 0.84      CrCl ~70.8 ml/min (min SCr 0.7)    AST/ALT/AP = 25/28/84 Tbili = 0.7  Calcium = 9.3    Labs 12/2/16 12/2/2016 13:48   Hepatitis B Surface Antigen Negative   Hepatitis B Ccore Ab, Total Negative   Hepatitis C Antibody Negative     Rituximab 1000 mg IV fixed dose   No calculation required   OK to treat with final dose = 1000 mg IV     Prolia 60 mg SQ given 1/17/18    Renae Hayward, PharmD, BCOP      "

## 2018-01-31 NOTE — PROGRESS NOTES
Chemotherapy Verification - PRIMARY RN      Height = 65 inches  Weight = 80.6 kg  BSA = 1.92 m2       Medication: Rituxan  Dose: 1000 mg set dose  Calculated Dose: 1000 mg                             (In mg/m2, AUC, mg/kg)       I confirm this process was performed independently with the BSA and all final chemotherapy dosing calculations congruent.  Any discrepancies of 5% or greater have been addressed with the chemotherapy pharmacist. The resolution of the discrepancy has been documented in the EPIC progress notes.

## 2018-02-06 ENCOUNTER — OFFICE VISIT (OUTPATIENT)
Dept: MEDICAL GROUP | Age: 78
End: 2018-02-06
Payer: MEDICARE

## 2018-02-06 VITALS
OXYGEN SATURATION: 95 % | DIASTOLIC BLOOD PRESSURE: 78 MMHG | TEMPERATURE: 98.2 F | SYSTOLIC BLOOD PRESSURE: 130 MMHG | WEIGHT: 179 LBS | HEART RATE: 64 BPM | BODY MASS INDEX: 29.82 KG/M2 | HEIGHT: 65 IN

## 2018-02-06 DIAGNOSIS — Z00.00 PREVENTATIVE HEALTH CARE: ICD-10-CM

## 2018-02-06 DIAGNOSIS — I67.9 CEREBROVASCULAR DISEASE: ICD-10-CM

## 2018-02-06 DIAGNOSIS — R73.01 IFG (IMPAIRED FASTING GLUCOSE): ICD-10-CM

## 2018-02-06 DIAGNOSIS — Z23 NEED FOR VACCINATION: ICD-10-CM

## 2018-02-06 DIAGNOSIS — F32.A CHRONIC DEPRESSIVE DISORDER: ICD-10-CM

## 2018-02-06 DIAGNOSIS — M05.711 RHEUMATOID ARTHRITIS INVOLVING RIGHT SHOULDER WITH POSITIVE RHEUMATOID FACTOR (HCC): ICD-10-CM

## 2018-02-06 DIAGNOSIS — E89.0 HYPOTHYROIDISM, POSTABLATIVE: ICD-10-CM

## 2018-02-06 DIAGNOSIS — E78.00 PURE HYPERCHOLESTEROLEMIA: ICD-10-CM

## 2018-02-06 PROCEDURE — 99215 OFFICE O/P EST HI 40 MIN: CPT | Performed by: FAMILY MEDICINE

## 2018-02-06 NOTE — ASSESSMENT & PLAN NOTE
The patient is a 77-year-old female who presents to clinic to reestablish care. She has a significant past medical history of hypothyroidism, rheumatoid arthritis, depression, cerebrovascular disease, hyperparathyroidism.   The patient denied any chest pain, no sob, no nguyen, no  pnd, no orthopnea, no headache, no changes in vision, no numbness or tingling, no nausea, no diarrhea, no abdominal pain, no fevers, no chills, no bright red blood per rectum, no  difficulty urinating, no burning during micturition, no depressed mood, no other concerns.

## 2018-02-06 NOTE — ASSESSMENT & PLAN NOTE
Synthroid 175 µg by mouth daily.    denies any new fatigue, cold/heat intolerance, weight gain/weight loss, diarrhea/constipation, dry skin, myalgia, depressed mood, palpitations, tremmor, hair loss, and no goiter.

## 2018-02-06 NOTE — ASSESSMENT & PLAN NOTE
The patient denies  any polydipsia, polyuria, polyphagia, weight loss, no numbness or tingling anywhere, no changes in vision, no ulcerations or poor healing wounds.  Results for ELIZABETH MYERS (MRN [id number]) as of 2/6/2018 11:21   Ref. Range 7/3/2017 10:13 11/28/2017 14:21 11/28/2017 14:25 1/17/2018 09:40   Glucose Latest Ref Range: 65 - 99 mg/dL 104 (H)   104 (H)

## 2018-02-06 NOTE — PROGRESS NOTES
This medical record contains text that has been entered with the assistance of computer voice recognition and dictation software.  Therefore, it may contain unintended errors in text, spelling, punctuation, or grammar    Chief Complaint   Patient presents with   • Other     see reason for visit       Elizabeth Myers is a 77 y.o. female here evaluation and management of: Establish care history of cerebrovascular disease, chronic depression, hyperlipidemia, prediabetes, hypothyroidism, rheumatoid arthritis      HPI:     Cerebrovascular disease  Taking Crestor for secondary prevention  No residual symptoms    Chronic depressive disorder  She was taking 1/2 tab of 10 mg tablet and feeling less mean to everyone. Overall she feels better. She lives alone, but has family nearby. This started when she became a  in 2015, overall doing better. She denies any suicidal or homicidal ideations. She denies any depressed mood.    Hyperlipemia  Rosuvastatin 10 mg by mouth daily    This was added for secondary prevention due to history of TIA.Patient has been stable with current management  We will make no changes for now.    IFG (impaired fasting glucose)    The patient denies  any polydipsia, polyuria, polyphagia, weight loss, no numbness or tingling anywhere, no changes in vision, no ulcerations or poor healing wounds.  Results for ELIZABETH MYERS (MRN [id number]) as of 2/6/2018 11:21   Ref. Range 7/3/2017 10:13 11/28/2017 14:21 11/28/2017 14:25 1/17/2018 09:40   Glucose Latest Ref Range: 65 - 99 mg/dL 104 (H)   104 (H)       Rheumatoid arthritis  The patient has been on chronic steroids, this is managed by Dr. Islas, her rheumatologist. She is also on methotrexate 2.5 mg    Hypothyroidism, postablative  Synthroid 175 µg by mouth daily.    denies any new fatigue, cold/heat intolerance, weight gain/weight loss, diarrhea/constipation, dry skin, myalgia, depressed mood, palpitations, tremmor, hair loss, and no  goiter.      Preventative health care  The patient is a 77-year-old female who presents to clinic to reestablish care. She has a significant past medical history of hypothyroidism, rheumatoid arthritis, depression, cerebrovascular disease, hyperparathyroidism.   The patient denied any chest pain, no sob, no nguyen, no  pnd, no orthopnea, no headache, no changes in vision, no numbness or tingling, no nausea, no diarrhea, no abdominal pain, no fevers, no chills, no bright red blood per rectum, no  difficulty urinating, no burning during micturition, no depressed mood, no other concerns.      Current medicines (including changes today)  Current Outpatient Prescriptions   Medication Sig Dispense Refill   • rosuvastatin (CRESTOR) 10 MG Tab TAKE 1 TABLET BY MOUTH  DAILY 90 Tab 0   • predniSONE (DELTASONE) 1 MG Tab 1 mg po q day decrease to 1 mg q other day if possible 30 Tab 1   • methotrexate 2.5 MG Tab Take 6 tablets by mouth  every 7 days lab due before 4/16/2018 for refills 72 Tab 0   • SYNTHROID 175 MCG Tab TAKE 1 TABLET BY MOUTH  EVERY MORNING ON AN EMPTY  STOMACH. 10 Tab 0   • folic acid (FOLVITE) 1 MG Tab Take 1 Tab by mouth every day. 30 Tab 11   • Calcium Citrate-Vitamin D (CALCIUM + D PO) Take  by mouth.     • Omega-3 Fatty Acids (FISH OIL) 1000 MG Cap capsule Take 1,000 mg by mouth 3 times a day, with meals.     • citalopram (CELEXA) 10 MG tablet Take 1 Tab by mouth every day. 90 Tab 2     No current facility-administered medications for this visit.      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  She  has a past surgical history that includes ankle fusion (2009); hip cannulated screw (6/15/2009); other (); appendectomy (age 7); cholecystectomy; parathyroid exploration (2012); rhytidectomy; and mammoplasty reduction ().  Social History   Substance Use Topics   • Smoking status: Current Some Day Smoker     Packs/day: 0.50     Years: 50.00     Types: Cigarettes   • Smokeless tobacco: Never Used      Comment: Previous heavy 1 ppd smoker.    • Alcohol use 0.0 oz/week      Comment: rare     Social History     Social History Narrative    Retired  for [employer].         51 yo son.    No grandchildren.    Sister with PCKD may have been 1/2 sister (family secret)     Family History   Problem Relation Age of Onset   • Genitourinary () Sister      polycystic kidneys   • Cancer Mother      breast   • Lung Disease Mother      TB   • Heart Disease Father      aneurysm     Family Status   Relation Status   • Mother     CHF, chronic lung dz   • Father     abd aneurysm   • Sister     polycystic kidney dz (1/2 sister)   • Sister Alive         ROS    Please see hpi     All other systems reviewed and are negative     Objective:     Blood pressure 130/78, pulse 64, temperature 36.8 °C (98.2 °F), height 1.651 m (5' 5\"), weight 81.2 kg (179 lb), last menstrual period 2005, SpO2 95 %. Body mass index is 29.79 kg/m².  Physical Exam:    Constitutional: Alert, no distress.  Skin: Warm, dry, good turgor, no rashes in visible areas.  Eye: Equal, round and reactive, conjunctiva clear, lids normal.  ENMT: Lips without lesions, good dentition, oropharynx clear.  Neck: Trachea midline, no masses, no thyromegaly. No cervical or supraclavicular lymphadenopathy.  Respiratory: Unlabored respiratory effort, lungs clear to auscultation, no wheezes, no ronchi.  Cardiovascular: Normal S1, S2, no " murmur, no edema.  Abdomen: Soft, non-tender, no masses, no hepatosplenomegaly.  Psych: Alert and oriented x3, normal affect and mood.          Assessment and Plan:   The following treatment plan was discussed      1. Hypothyroidism, postablative  Patient has been stable with current management  We will make no changes for now      2. Pure hypercholesterolemia  Due for repeat labs    3. Cerebrovascular disease  Continue blood pressure control  Continue moderate intensity statin therapy  We should consider placing the patient on a daily prophylactic aspirin as she has no history of peptic ulcer disease nor is she at risk of gastrointestinal bleed based on history    4. Chronic depressive disorder  Patient has been stable with current management  We will make no changes for now      5. Need for vaccination  Will confirm with Rhematology  Given h/o chronic steroids and other imunosuppressants    6. IFG (impaired fasting glucose)      Discussed the following with patient  IFG= A1C of 5.7-6.4   Impaired glucose tolerance (IGT) - OGTT (75 g oral glucose load) results in a two-hour plasma glucose of 140 to 199 mg/dL (7.8 to 11.0 mmol/L).  Impaired fasting glucose (IFG) -  to 125 mg/dL (5.6 to 6.9 mmol/L).    I recommend  aggressive lifestyle modification (weight loss, and increase physical activity)    There is a  risk of developing Diabetes mellitus--and the further increased risk in patients who are obese and have a positive family history    The goals of intervention in individuals  at risk for developing DM include the prevention or delay of DM and the associated increased risk of Microvascular complications and cardiovascular disease (CVD)      If lifestyle interventions fail, then we shall begin Metformin for Diabetes prevention, this is based on the Diabetes prevention  program (DPP)        7. Rheumatoid arthritis involving right shoulder with positive rheumatoid factor (CMS-HCC)  Patient has been stable with  current management  We will make no changes for now      8. Preventative health care  Care has been established  We need baseline labs to establish a clinical profile  We will then consider daily prophylactic aspirin   In order to weigh  the benefits vs risk of GI bleed    We reviewed USPSTF guidelines      The current evidence is insufficient to assess the balance of benefits and harms of initiating aspirin use for the primary prevention of CVD and CRC in adults aged 70 years or older.         Requested Medical records to be sent to us  Denies intimate partner viloence      Over 40 minutes spent with patient face to face, greater than 50% time spent with plan/cordination of care as above in my A/P.          HEALTH MAINTENANCE:    Instructed to Follow up in clinic or ER for worsening symptoms, difficulty breathing, lack of expected recovery, or should new symptoms or problems arise.    Followup: Return in about 3 months (around 5/6/2018) for Reevaluation.       Once again this medical record contains text that has been entered with the assistance of computer voice recognition and dictation software.  Therefore, it may contain unintended errors in text, spelling, punctuation, or grammar

## 2018-02-06 NOTE — ASSESSMENT & PLAN NOTE
Rosuvastatin 10 mg by mouth daily    This was added for secondary prevention due to history of TIA.Patient has been stable with current management  We will make no changes for now.

## 2018-04-25 ENCOUNTER — OFFICE VISIT (OUTPATIENT)
Dept: RHEUMATOLOGY | Facility: PHYSICIAN GROUP | Age: 78
End: 2018-04-25
Payer: MEDICARE

## 2018-04-25 VITALS
SYSTOLIC BLOOD PRESSURE: 120 MMHG | OXYGEN SATURATION: 90 % | WEIGHT: 181.4 LBS | DIASTOLIC BLOOD PRESSURE: 70 MMHG | BODY MASS INDEX: 30.19 KG/M2 | TEMPERATURE: 98.6 F | RESPIRATION RATE: 12 BRPM | HEART RATE: 74 BPM

## 2018-04-25 DIAGNOSIS — M85.80 OSTEOPENIA, UNSPECIFIED LOCATION: ICD-10-CM

## 2018-04-25 DIAGNOSIS — M05.9 SEROPOSITIVE RHEUMATOID ARTHRITIS (HCC): ICD-10-CM

## 2018-04-25 DIAGNOSIS — Z79.899 ENCOUNTER FOR LONG-TERM (CURRENT) USE OF HIGH-RISK MEDICATION: ICD-10-CM

## 2018-04-25 PROCEDURE — 99214 OFFICE O/P EST MOD 30 MIN: CPT | Performed by: INTERNAL MEDICINE

## 2018-04-25 ASSESSMENT — ENCOUNTER SYMPTOMS
FALLS: 0
FEVER: 0
BLURRED VISION: 0
PALPITATIONS: 0
BACK PAIN: 0
CHILLS: 0

## 2018-04-25 ASSESSMENT — PAIN SCALES - GENERAL: PAINLEVEL: NO PAIN

## 2018-04-25 NOTE — LETTER
[facility]   [address], [address]  [place] [address]  Phone: [phone]  Fax: [phone]              Encounter Date: 4/25/2018    Dear Dr. Browning,      It was a pleasure seeing your patient, Gaye Antoine, on 4/25/2018. Diagnoses of Osteopenia, unspecified location, Seropositive rheumatoid arthritis (CMS-[facility]), and Encounter for long-term (current) use of high-risk medication were pertinent to this visit.     Please find attached progress note which includes the history I obtained from Ms. Antoine, my physical examination findings, my impression and recommendations.      Once again, it was a pleasure participating in your patient's care.  Please feel free to contact me if you have any questions or if I can be of any further assistance to your patients.      Sincerely,    Sophie Islas M.D.  Electronically Signed          PROGRESS NOTE:  Subjective:   Date of Consultation:4/25/2018  1:00 PM  Primary care physician:Sada Guillory M.D.      Reason for Consultation:  Ms. Antoine  is a pleasant 76 y.o. year old female who presented with  Follow-up for Rheumatoid Arthritis    Rheumatoid Arthritis   Our patient has been stable.  At our last visit we discussed tapering prednione to 1 mg q other day and evenutally off.  SHe has been on methotrexate 5 tabs q Wednesday and prednisone 1 mg q day.  No morning stiffness no swelling.     Current Medications  Methotrexate 5 tabs weekly q Wednesday  Folic acid  rituxan infusion 1/4/2017 and 1/18/2017 and 1/31/2018 and 1/17/2018  Prednisone 1 mg daily      RHEUM HISTORY  She was diagnosed in 1984 with presenting symptoms of burning feet.  At the time she denied rash.  EMG was not completed.  She states the burning of her feet were the only symptoms.  She tried GOLD and then prednisone.  Ms. Gaye Antoine reports she has tried several medications and only rituxan works.  She reports being on methotrexate x 30 years. She cannot recall  the names of the medications    Osteopenia in spine and right femur  On prolia  Last injection was 1/17/2018  DEXA 10/23/2012 showed osteopenia with FRAX scores that are elevated > 20% and > 3 % for 10 year risk of major fracture and hip fracture respectively  Taking calcium C and vitmain D ( takes 3 pills daily)        Past Medical History:   Diagnosis Date   • Cholelithiasis 2010   • Chronic constipation    • Chronic depressive disorder 1/19/2013   • Chronic use of steroids 1/19/2013   • CVA (cerebral infarction) 2008    L hemiparesis / ? 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, late effect of cerebrovascular disease (CMS-HCC) 1/19/2013   • Renal cyst 2010    R kidney   • Rheumatoid arthritis(714.0)    • S/P parathyroidectomy (CMS-HCC) 2012    single adenoma   • Tobacco use 1/19/2013     Past Surgical History:   Procedure Laterality Date   • PARATHYROID EXPLORATION  2/22/2012    Performed by CURTIS DUPONT at SURGERY SAME DAY [facility] ORS   • HIP CANNULATED SCREW  6/15/2009    Performed by DANIELLE LANG at SURGERY [facility] ORS   • ANKLE FUSION  1/8/2009    Performed by WM GONSALO HERRERA at SURGERY SAME DAY [facility] ORS   • OTHER  2008    submaxillary gland infection   • MAMMOPLASTY REDUCTION  2006    complicated by abscess needing I&D   • APPENDECTOMY  age 7   • CHOLECYSTECTOMY     • RHYTIDECTOMY       Allergies   Allergen Reactions   • Sulfa Drugs      Not sure     Outpatient Encounter Prescriptions as of 4/25/2018   Medication Sig Dispense Refill   • methotrexate 2.5 MG Tab Take 6 tablets by mouth  every 7 days lab due before 4/16/2018 for refills 72 Tab 0   • folic acid (FOLVITE) 1 MG Tab Take 1 Tab by mouth every day. 30 Tab 11   • rosuvastatin (CRESTOR) 10 MG Tab TAKE 1 TABLET BY MOUTH  DAILY 90 Tab 0   •  predniSONE (DELTASONE) 1 MG Tab 1 mg po q day decrease to 1 mg q other day if possible 30 Tab 1   • SYNTHROID 175 MCG Tab TAKE 1 TABLET BY MOUTH  EVERY MORNING ON AN EMPTY  STOMACH. 10 Tab 0   • Calcium Citrate-Vitamin D (CALCIUM + D PO) Take  by mouth.     • Omega-3 Fatty Acids (FISH OIL) 1000 MG Cap capsule Take 1,000 mg by mouth 3 times a day, with meals.     • citalopram (CELEXA) 10 MG tablet Take 1 Tab by mouth every day. 90 Tab 2     No facility-administered encounter medications on file as of 2018.      Social History     Social History   • Marital status:      Spouse name: N/A   • Number of children: 1   • Years of education: some colle     Occupational History   • [employer] Retired     Social History Main Topics   • Smoking status: Current Some Day Smoker     Packs/day: 0.50     Years: 50.00     Types: Cigarettes   • Smokeless tobacco: Never Used      Comment: Previous heavy 1 ppd smoker.    • Alcohol use 0.0 oz/week      Comment: rare   • Drug use: No   • Sexual activity: Not Currently      Comment:       Other Topics Concern   • Not on file     Social History Narrative    Retired  for [employer].         49 yo son.    No grandchildren.    Sister with PCKD may have been 1/2 sister (family secret)      History   Smoking Status   • Current Some Day Smoker   • Packs/day: 0.50   • Years: 50.00   • Types: Cigarettes   Smokeless Tobacco   • Never Used     Comment: Previous heavy 1 ppd smoker.      History   Alcohol Use   • 0.0 oz/week     Comment: rare     History   Drug Use No      OB History    Para Term  AB Living   1 1           SAB TAB Ectopic Molar Multiple Live Births                    # Outcome Date GA Lbr Khanh/2nd Weight Sex Delivery Anes PTL Lv   1 Para                  Patient's last menstrual period was 2005.    G P A L     Family History   Problem Relation Age of Onset   • Genitourinary () Sister       polycystic kidneys   • Cancer Mother      breast   • Lung Disease Mother      TB   • Heart Disease Father      aneurysm       Review of Systems   Constitutional: Negative for chills and fever.   Eyes: Negative for blurred vision.   Cardiovascular: Negative for chest pain and palpitations.   Musculoskeletal: Negative for back pain, falls and joint pain.        Objective:   /70   Pulse 74   Temp 37 °C (98.6 °F)   Resp 12   Wt 82.3 kg (181 lb 6.4 oz)   LMP 03/01/2005   SpO2 90%   BMI 30.19 kg/m²      Physical Exam   Constitutional: She is oriented to person, place, and time. She appears well-developed and well-nourished. No distress.   HENT:   Head: Normocephalic and atraumatic.   Right Ear: External ear normal.   Left Ear: External ear normal.   Eyes: Conjunctivae are normal. Right eye exhibits no discharge. Left eye exhibits no discharge. No scleral icterus.   Cardiovascular: Normal rate, regular rhythm and normal heart sounds.    Pulmonary/Chest: No stridor. No respiratory distress. She has no wheezes.   Abdominal: Soft. She exhibits no distension. There is no tenderness.   No hepatosplenomegaly   Musculoskeletal: She exhibits no edema.   No synovitis appreciated in MCPs, PIPs or DIPs. No synovitis she and the wrists bilateral.  No MTP tenderness bilateral but there is ulnar or lateral deviation of the 2nd and 3rd  Digits on the right foot.   Neurological: She is alert and oriented to person, place, and time.   Skin: She is not diaphoretic.   Over the area of the right press there is an mild area of erythema with no fluctuation or induration appreciated   Psychiatric: She has a normal mood and affect. Her behavior is normal. Judgment and thought content normal.       Assessment:     1. Osteopenia, unspecified location  DS-BONE DENSITY STUDY (DEXA)    CBC WITH DIFFERENTIAL    COMP METABOLIC PANEL    CRP QUANTITIVE (NON-CARDIAC)    WESTERGREN SED RATE   2. Seropositive rheumatoid arthritis (CMS-HCC)   DS-BONE DENSITY STUDY (DEXA)    CBC WITH DIFFERENTIAL    COMP METABOLIC PANEL    CRP QUANTITIVE (NON-CARDIAC)    WESTERGREN SED RATE   3. Encounter for long-term (current) use of high-risk medication       Labs:  Scooby last injection  Lab Results   Component Value Date/Time    QNTTBGOLD Negative 09/28/2016 12:19 PM     Lab Results   Component Value Date/Time    HEPBCORTOT Negative 12/02/2016 01:48 PM    HEPBSAG Negative 12/02/2016 01:48 PM     Lab Results   Component Value Date/Time    HEPCAB Negative 12/02/2016 01:48 PM     Lab Results   Component Value Date/Time    SODIUM 141 01/17/2018 09:40 AM    POTASSIUM 3.8 01/17/2018 09:40 AM    CHLORIDE 106 01/17/2018 09:40 AM    CO2 26 01/17/2018 09:40 AM    GLUCOSE 104 (H) 01/17/2018 09:40 AM    BUN 14 01/17/2018 09:40 AM    CREATININE 0.84 01/17/2018 09:40 AM    CREATININE 0.8 12/31/2008 02:01 PM      Lab Results   Component Value Date/Time    WBC 5.4 01/17/2018 09:40 AM    RBC 5.18 01/17/2018 09:40 AM    HEMOGLOBIN 15.8 01/17/2018 09:40 AM    HEMATOCRIT 47.4 (H) 01/17/2018 09:40 AM    MCV 91.5 01/17/2018 09:40 AM    MCH 30.5 01/17/2018 09:40 AM    MCHC 33.3 (L) 01/17/2018 09:40 AM    MPV 10.8 01/17/2018 09:40 AM    NEUTSPOLYS 61.40 01/17/2018 09:40 AM    LYMPHOCYTES 26.20 01/17/2018 09:40 AM    MONOCYTES 10.00 01/17/2018 09:40 AM    EOSINOPHILS 1.30 01/17/2018 09:40 AM    BASOPHILS 0.40 01/17/2018 09:40 AM    HYPOCHROMIA 1+ 05/09/2014 12:01 PM    ANISOCYTOSIS 1+ 06/10/2008 05:00 AM      Lab Results   Component Value Date/Time    CALCIUM 9.3 01/17/2018 09:40 AM    ASTSGOT 25 01/17/2018 09:40 AM    ALTSGPT 28 01/17/2018 09:40 AM    ALKPHOSPHAT 84 01/17/2018 09:40 AM    TBILIRUBIN 0.7 01/17/2018 09:40 AM    ALBUMIN 4.1 01/17/2018 09:40 AM    TOTPROTEIN 6.4 01/17/2018 09:40 AM     Lab Results   Component Value Date/Time    RHEUMFACTN >360 (H) 11/28/2017 02:21 PM    CCPANTIBODY 106 (H) 09/28/2016 12:19 PM    ANTINUCAB NONE DET 06/12/2008 06:15 AM     Lab Results   Component  Value Date/Time    SEDRATEWES 6 11/28/2017 02:25 PM    CREACTPROT 0.34 11/28/2017 02:25 PM     No results found for: RUSSELVIPER, DRVVTINTERP  No results found for: P0YUECPLZCU, X3VQSUXGDDP, IGGCARDIOLI, IGMCARDIOLI, IGACARDIOLI, CRYOGLOBULIN  Lab Results   Component Value Date/Time    ANTIDNADS None Detected 09/28/2016 12:19 PM    RNPAB 0 09/28/2016 12:19 PM    SMITHAB 0 09/28/2016 12:19 PM    OKXPGXA42 0 09/28/2016 12:19 PM    CENTROMBAB 2 09/28/2016 12:19 PM     Lab Results   Component Value Date/Time    ANTIDNADS None Detected 09/28/2016 12:19 PM    RNPAB 0 09/28/2016 12:19 PM    ANTISSBSJ 0 09/28/2016 12:19 PM     Lab Results   Component Value Date/Time    COLORURINE Lt. Yellow 05/19/2015 02:30 PM    SPECGRAVITY 1.015 05/19/2015 02:30 PM    PHURINE 5.5 05/19/2015 02:30 PM    GLUCOSEUR Negative 05/19/2015 02:30 PM    KETONES Negative 05/19/2015 02:30 PM    PROTEINURIN Negative 05/19/2015 02:30 PM     Lab Results   Component Value Date/Time    TOTALVOLUME 1500 12/21/2011 12:00 AM     Lab Results   Component Value Date/Time    SSA60 1 09/28/2016 12:19 PM    SSA52 4 09/28/2016 12:19 PM     Lab Results   Component Value Date/Time    HBA1C 5.5 05/09/2014 11:59 AM     Lab Results   Component Value Date/Time    CPKTOTAL 69 09/28/2016 12:19 PM     No results found for: G6PD  No results found for: NEKR25EMYY  No results found for: ACESERUM  Lab Results   Component Value Date/Time    25HYDROXY 25 (L) 05/09/2014 11:59 AM     No results found for: TSH, FREEDIR  Lab Results   Component Value Date/Time    TSHULTRASEN 1.630 03/11/2016 03:07 PM    FREET4 1.60 (H) 03/11/2016 03:07 PM     No results found for: MICROSOMALA, ANTITHYROGL  No results found for: IGGLYMEABS  No results found for: ANTIMITOCHO, FACTIN  No results found for: IGA, TTRANSIGA, ENDOIGA  No results found for: FLTYPE, CRYSTALSBDF  Lab Results   Component Value Date/Time    ISTATICAL 1.14 01/18/2017 09:14 AM     Lab Results   Component Value Date/Time     ISTATCREAT 0.6 01/18/2017 09:14 AM     No results found for: CTELOPEP  No results found for: GBMABG  Lab Results   Component Value Date/Time    PTHINTACT 24.6 08/06/2013 04:16 PM         Medical Decision Making:  Today's Assessment / Status / Plan:     Rheumatoid Arthritis, seropositive  Controlled on methotrexate, rituximab, and prednisone 1 mg. She has been stable (per patient > 30 years).  She did well with the decrease in methotrexate at 5 tabs weekly.  We will continue for another 3 months and if stable decrease to 4 tabs weekly.  Will try again to taper prednisone to 1mg q other day and then stop    Osteopenia  Continue injections.  DEXA ordered today    1. Osteopenia, unspecified location  DS-BONE DENSITY STUDY (DEXA)    CBC WITH DIFFERENTIAL    COMP METABOLIC PANEL    CRP QUANTITIVE (NON-CARDIAC)    WESTERGREN SED RATE   2. Seropositive rheumatoid arthritis (CMS-[facility])  DS-BONE DENSITY STUDY (DEXA)    CBC WITH DIFFERENTIAL    COMP METABOLIC PANEL    CRP QUANTITIVE (NON-CARDIAC)    WESTERGREN SED RATE   3. Encounter for long-term (current) use of high-risk medication         Return in about 3 months (around 7/25/2018).      I have spent greater than 50% of this 30 minute visit in counseling/coordination of care with the patient regarding her therapy plan    She agreed and verbalized her agreement and understanding with the current plan. I answered all questions and concerns she has at this time and advised her to call at any time in there interim with questions or concerns in regards to her care.    Thank you for allowing me to participate in her care, I will continue to follow closely.     Please note that this dictation was created using voice recognition software. I have made every reasonable attempt to correct obvious errors, but I expect that there are errors of grammar and possibly content that I did not discover before finalizing the note.

## 2018-04-25 NOTE — PROGRESS NOTES
Subjective:   Date of Consultation:4/25/2018  1:00 PM  Primary care physician:Sada Guillory M.D.      Reason for Consultation:  Ms. Antoine  is a pleasant 76 y.o. year old female who presented with  Follow-up for Rheumatoid Arthritis    Rheumatoid Arthritis   Our patient has been stable.  At our last visit we discussed tapering prednione to 1 mg q other day and evenutally off.  SHe has been on methotrexate 5 tabs q Wednesday and prednisone 1 mg q day.  No morning stiffness no swelling.     Current Medications  Methotrexate 5 tabs weekly q Wednesday  Folic acid  rituxan infusion 1/4/2017 and 1/18/2017 and 1/31/2018 and 1/17/2018  Prednisone 1 mg daily      RHEUM HISTORY  She was diagnosed in 1984 with presenting symptoms of burning feet.  At the time she denied rash.  EMG was not completed.  She states the burning of her feet were the only symptoms.  She tried GOLD and then prednisone.  Ms. Gaye Antoine reports she has tried several medications and only rituxan works.  She reports being on methotrexate x 30 years. She cannot recall the names of the medications    Osteopenia in spine and right femur  On prolia  Last injection was 1/17/2018  DEXA 10/23/2012 showed osteopenia with FRAX scores that are elevated > 20% and > 3 % for 10 year risk of major fracture and hip fracture respectively  Taking calcium C and vitmain D ( takes 3 pills daily)        Past Medical History:   Diagnosis Date   • Cholelithiasis 2010   • Chronic constipation    • Chronic depressive disorder 1/19/2013   • Chronic use of steroids 1/19/2013   • CVA (cerebral infarction) 2008    L hemiparesis / ? 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, late effect of cerebrovascular disease (CMS-[facility]) 1/19/2013   • Renal cyst 2010    R kidney   • Rheumatoid  arthritis(714.0)    • S/P parathyroidectomy (CMS-[facility]) 2012    single adenoma   • Tobacco use 1/19/2013     Past Surgical History:   Procedure Laterality Date   • PARATHYROID EXPLORATION  2/22/2012    Performed by CURTIS DUPONT at SURGERY SAME DAY [facility] ORS   • HIP CANNULATED SCREW  6/15/2009    Performed by DANIELLE LANG at SURGERY [facility] ORS   • ANKLE FUSION  1/8/2009    Performed by WM GONSALO HERRERA at [facility]ERY [facility]   • OTHER  2008    submaxillary gland infection   • MAMMOPLASTY REDUCTION  2006    complicated by abscess needing I&D   • APPENDECTOMY  age 7   • CHOLECYSTECTOMY     • RHYTIDECTOMY       Allergies   Allergen Reactions   • Sulfa Drugs      Not sure     Outpatient Encounter Prescriptions as of 4/25/2018   Medication Sig Dispense Refill   • methotrexate 2.5 MG Tab Take 6 tablets by mouth  every 7 days lab due before 4/16/2018 for refills 72 Tab 0   • folic acid (FOLVITE) 1 MG Tab Take 1 Tab by mouth every day. 30 Tab 11   • rosuvastatin (CRESTOR) 10 MG Tab TAKE 1 TABLET BY MOUTH  DAILY 90 Tab 0   • predniSONE (DELTASONE) 1 MG Tab 1 mg po q day decrease to 1 mg q other day if possible 30 Tab 1   • SYNTHROID 175 MCG Tab TAKE 1 TABLET BY MOUTH  EVERY MORNING ON AN EMPTY  STOMACH. 10 Tab 0   • Calcium Citrate-Vitamin D (CALCIUM + D PO) Take  by mouth.     • Omega-3 Fatty Acids (FISH OIL) 1000 MG Cap capsule Take 1,000 mg by mouth 3 times a day, with meals.     • citalopram (CELEXA) 10 MG tablet Take 1 Tab by mouth every day. 90 Tab 2     No facility-administered encounter medications on file as of 4/25/2018.      Social History     Social History   • Marital status:      Spouse name: N/A   • Number of children: 1   • Years of education: some colle     Occupational History   • [employer] Retired     Social History Main Topics   • Smoking status: Current Some Day Smoker     Packs/day: 0.50     Years: 50.00     Types: Cigarettes   • Smokeless tobacco:  Never Used      Comment: Previous heavy 1 ppd smoker.    • Alcohol use 0.0 oz/week      Comment: rare   • Drug use: No   • Sexual activity: Not Currently      Comment:       Other Topics Concern   • Not on file     Social History Narrative    Retired  for [employer].     2005    49 yo son.    No grandchildren.    Sister with PCKD may have been 1/2 sister (family secret)      History   Smoking Status   • Current Some Day Smoker   • Packs/day: 0.50   • Years: 50.00   • Types: Cigarettes   Smokeless Tobacco   • Never Used     Comment: Previous heavy 1 ppd smoker.      History   Alcohol Use   • 0.0 oz/week     Comment: rare     History   Drug Use No      OB History    Para Term  AB Living   1 1           SAB TAB Ectopic Molar Multiple Live Births                    # Outcome Date GA Lbr Khanh/2nd Weight Sex Delivery Anes PTL Lv   1 Para                  Patient's last menstrual period was 2005.    G P A L     Family History   Problem Relation Age of Onset   • Genitourinary () Sister      polycystic kidneys   • Cancer Mother      breast   • Lung Disease Mother      TB   • Heart Disease Father      aneurysm       Review of Systems   Constitutional: Negative for chills and fever.   Eyes: Negative for blurred vision.   Cardiovascular: Negative for chest pain and palpitations.   Musculoskeletal: Negative for back pain, falls and joint pain.        Objective:   /70   Pulse 74   Temp 37 °C (98.6 °F)   Resp 12   Wt 82.3 kg (181 lb 6.4 oz)   LMP 2005   SpO2 90%   BMI 30.19 kg/m²     Physical Exam   Constitutional: She is oriented to person, place, and time. She appears well-developed and well-nourished. No distress.   HENT:   Head: Normocephalic and atraumatic.   Right Ear: External ear normal.   Left Ear: External ear normal.   Eyes: Conjunctivae are normal. Right eye exhibits no discharge. Left eye exhibits no discharge. No scleral icterus.    Cardiovascular: Normal rate, regular rhythm and normal heart sounds.    Pulmonary/Chest: No stridor. No respiratory distress. She has no wheezes.   Abdominal: Soft. She exhibits no distension. There is no tenderness.   No hepatosplenomegaly   Musculoskeletal: She exhibits no edema.   No synovitis appreciated in MCPs, PIPs or DIPs. No synovitis she and the wrists bilateral.  No MTP tenderness bilateral but there is ulnar or lateral deviation of the 2nd and 3rd  Digits on the right foot.   Neurological: She is alert and oriented to person, place, and time.   Skin: She is not diaphoretic.   Over the area of the right press there is an mild area of erythema with no fluctuation or induration appreciated   Psychiatric: She has a normal mood and affect. Her behavior is normal. Judgment and thought content normal.       Assessment:     1. Osteopenia, unspecified location  DS-BONE DENSITY STUDY (DEXA)    CBC WITH DIFFERENTIAL    COMP METABOLIC PANEL    CRP QUANTITIVE (NON-CARDIAC)    WESTERGREN SED RATE   2. Seropositive rheumatoid arthritis (CMS-HCC)  DS-BONE DENSITY STUDY (DEXA)    CBC WITH DIFFERENTIAL    COMP METABOLIC PANEL    CRP QUANTITIVE (NON-CARDIAC)    WESTERGREN SED RATE   3. Encounter for long-term (current) use of high-risk medication       Labs:  Scooby last injection  Lab Results   Component Value Date/Time    QNTTBGOLD Negative 09/28/2016 12:19 PM     Lab Results   Component Value Date/Time    HEPBCORTOT Negative 12/02/2016 01:48 PM    HEPBSAG Negative 12/02/2016 01:48 PM     Lab Results   Component Value Date/Time    HEPCAB Negative 12/02/2016 01:48 PM     Lab Results   Component Value Date/Time    SODIUM 141 01/17/2018 09:40 AM    POTASSIUM 3.8 01/17/2018 09:40 AM    CHLORIDE 106 01/17/2018 09:40 AM    CO2 26 01/17/2018 09:40 AM    GLUCOSE 104 (H) 01/17/2018 09:40 AM    BUN 14 01/17/2018 09:40 AM    CREATININE 0.84 01/17/2018 09:40 AM    CREATININE 0.8 12/31/2008 02:01 PM      Lab Results   Component  Value Date/Time    WBC 5.4 01/17/2018 09:40 AM    RBC 5.18 01/17/2018 09:40 AM    HEMOGLOBIN 15.8 01/17/2018 09:40 AM    HEMATOCRIT 47.4 (H) 01/17/2018 09:40 AM    MCV 91.5 01/17/2018 09:40 AM    MCH 30.5 01/17/2018 09:40 AM    MCHC 33.3 (L) 01/17/2018 09:40 AM    MPV 10.8 01/17/2018 09:40 AM    NEUTSPOLYS 61.40 01/17/2018 09:40 AM    LYMPHOCYTES 26.20 01/17/2018 09:40 AM    MONOCYTES 10.00 01/17/2018 09:40 AM    EOSINOPHILS 1.30 01/17/2018 09:40 AM    BASOPHILS 0.40 01/17/2018 09:40 AM    HYPOCHROMIA 1+ 05/09/2014 12:01 PM    ANISOCYTOSIS 1+ 06/10/2008 05:00 AM      Lab Results   Component Value Date/Time    CALCIUM 9.3 01/17/2018 09:40 AM    ASTSGOT 25 01/17/2018 09:40 AM    ALTSGPT 28 01/17/2018 09:40 AM    ALKPHOSPHAT 84 01/17/2018 09:40 AM    TBILIRUBIN 0.7 01/17/2018 09:40 AM    ALBUMIN 4.1 01/17/2018 09:40 AM    TOTPROTEIN 6.4 01/17/2018 09:40 AM     Lab Results   Component Value Date/Time    RHEUMFACTN >360 (H) 11/28/2017 02:21 PM    CCPANTIBODY 106 (H) 09/28/2016 12:19 PM    ANTINUCAB NONE DET 06/12/2008 06:15 AM     Lab Results   Component Value Date/Time    SEDRATEWES 6 11/28/2017 02:25 PM    CREACTPROT 0.34 11/28/2017 02:25 PM     No results found for: RUSSELVIPER, DRVVTINTERP  No results found for: X7VFSBFWQBH, S6VKRVXMBNR, IGGCARDIOLI, IGMCARDIOLI, IGACARDIOLI, CRYOGLOBULIN  Lab Results   Component Value Date/Time    ANTIDNADS None Detected 09/28/2016 12:19 PM    RNPAB 0 09/28/2016 12:19 PM    SMITHAB 0 09/28/2016 12:19 PM    RISQXLQ96 0 09/28/2016 12:19 PM    CENTROMBAB 2 09/28/2016 12:19 PM     Lab Results   Component Value Date/Time    ANTIDNADS None Detected 09/28/2016 12:19 PM    RNPAB 0 09/28/2016 12:19 PM    ANTISSBSJ 0 09/28/2016 12:19 PM     Lab Results   Component Value Date/Time    COLORURINE Lt. Yellow 05/19/2015 02:30 PM    SPECGRAVITY 1.015 05/19/2015 02:30 PM    PHURINE 5.5 05/19/2015 02:30 PM    GLUCOSEUR Negative 05/19/2015 02:30 PM    KETONES Negative 05/19/2015 02:30 PM     PROTEINURIN Negative 05/19/2015 02:30 PM     Lab Results   Component Value Date/Time    TOTALVOLUME 1500 12/21/2011 12:00 AM     Lab Results   Component Value Date/Time    SSA60 1 09/28/2016 12:19 PM    SSA52 4 09/28/2016 12:19 PM     Lab Results   Component Value Date/Time    HBA1C 5.5 05/09/2014 11:59 AM     Lab Results   Component Value Date/Time    CPKTOTAL 69 09/28/2016 12:19 PM     No results found for: G6PD  No results found for: XQMY60QESD  No results found for: ACESERUM  Lab Results   Component Value Date/Time    25HYDROXY 25 (L) 05/09/2014 11:59 AM     No results found for: TSH, FREEDIR  Lab Results   Component Value Date/Time    TSHULTRASEN 1.630 03/11/2016 03:07 PM    FREET4 1.60 (H) 03/11/2016 03:07 PM     No results found for: MICROSOMALA, ANTITHYROGL  No results found for: IGGLYMEABS  No results found for: ANTIMITOCHO, FACTIN  No results found for: IGA, TTRANSIGA, ENDOIGA  No results found for: FLTYPE, CRYSTALSBDF  Lab Results   Component Value Date/Time    ISTATICAL 1.14 01/18/2017 09:14 AM     Lab Results   Component Value Date/Time    ISTATCREAT 0.6 01/18/2017 09:14 AM     No results found for: CTELOPEP  No results found for: GBMABG  Lab Results   Component Value Date/Time    PTHINTACT 24.6 08/06/2013 04:16 PM         Medical Decision Making:  Today's Assessment / Status / Plan:     Rheumatoid Arthritis, seropositive  Controlled on methotrexate, rituximab, and prednisone 1 mg. She has been stable (per patient > 30 years).  She did well with the decrease in methotrexate at 5 tabs weekly.  We will continue for another 3 months and if stable decrease to 4 tabs weekly.  Will try again to taper prednisone to 1mg q other day and then stop    Osteopenia  Continue injections.  DEXA ordered today    1. Osteopenia, unspecified location  DS-BONE DENSITY STUDY (DEXA)    CBC WITH DIFFERENTIAL    COMP METABOLIC PANEL    CRP QUANTITIVE (NON-CARDIAC)    WESTERGREN SED RATE   2. Seropositive rheumatoid arthritis  (CMS-[facility])  DS-BONE DENSITY STUDY (DEXA)    CBC WITH DIFFERENTIAL    COMP METABOLIC PANEL    CRP QUANTITIVE (NON-CARDIAC)    WESTERGREN SED RATE   3. Encounter for long-term (current) use of high-risk medication         Return in about 3 months (around 7/25/2018).      I have spent greater than 50% of this 30 minute visit in counseling/coordination of care with the patient regarding her therapy plan    She agreed and verbalized her agreement and understanding with the current plan. I answered all questions and concerns she has at this time and advised her to call at any time in there interim with questions or concerns in regards to her care.    Thank you for allowing me to participate in her care, I will continue to follow closely.     Please note that this dictation was created using voice recognition software. I have made every reasonable attempt to correct obvious errors, but I expect that there are errors of grammar and possibly content that I did not discover before finalizing the note.

## 2018-05-21 ENCOUNTER — HOSPITAL ENCOUNTER (OUTPATIENT)
Dept: LAB | Facility: MEDICAL CENTER | Age: 78
End: 2018-05-21
Attending: INTERNAL MEDICINE
Payer: MEDICARE

## 2018-05-21 DIAGNOSIS — M05.9 SEROPOSITIVE RHEUMATOID ARTHRITIS (HCC): ICD-10-CM

## 2018-05-21 DIAGNOSIS — M85.80 OSTEOPENIA, UNSPECIFIED LOCATION: ICD-10-CM

## 2018-05-21 LAB
ALBUMIN SERPL BCP-MCNC: 4.2 G/DL (ref 3.2–4.9)
ALBUMIN/GLOB SERPL: 1.7 G/DL
ALP SERPL-CCNC: 69 U/L (ref 30–99)
ALT SERPL-CCNC: 20 U/L (ref 2–50)
ANION GAP SERPL CALC-SCNC: 10 MMOL/L (ref 0–11.9)
AST SERPL-CCNC: 18 U/L (ref 12–45)
BASOPHILS # BLD AUTO: 0.3 % (ref 0–1.8)
BASOPHILS # BLD: 0.02 K/UL (ref 0–0.12)
BILIRUB SERPL-MCNC: 0.6 MG/DL (ref 0.1–1.5)
BUN SERPL-MCNC: 13 MG/DL (ref 8–22)
CALCIUM SERPL-MCNC: 9.2 MG/DL (ref 8.5–10.5)
CHLORIDE SERPL-SCNC: 107 MMOL/L (ref 96–112)
CO2 SERPL-SCNC: 27 MMOL/L (ref 20–33)
CREAT SERPL-MCNC: 0.86 MG/DL (ref 0.5–1.4)
CRP SERPL HS-MCNC: 0.43 MG/DL (ref 0–0.75)
EOSINOPHIL # BLD AUTO: 0.04 K/UL (ref 0–0.51)
EOSINOPHIL NFR BLD: 0.5 % (ref 0–6.9)
ERYTHROCYTE [DISTWIDTH] IN BLOOD BY AUTOMATED COUNT: 51.2 FL (ref 35.9–50)
ERYTHROCYTE [SEDIMENTATION RATE] IN BLOOD BY WESTERGREN METHOD: 4 MM/HOUR (ref 0–30)
GLOBULIN SER CALC-MCNC: 2.5 G/DL (ref 1.9–3.5)
GLUCOSE SERPL-MCNC: 86 MG/DL (ref 65–99)
HCT VFR BLD AUTO: 50.9 % (ref 37–47)
HGB BLD-MCNC: 16 G/DL (ref 12–16)
IMM GRANULOCYTES # BLD AUTO: 0.03 K/UL (ref 0–0.11)
IMM GRANULOCYTES NFR BLD AUTO: 0.4 % (ref 0–0.9)
LYMPHOCYTES # BLD AUTO: 1.37 K/UL (ref 1–4.8)
LYMPHOCYTES NFR BLD: 18.7 % (ref 22–41)
MCH RBC QN AUTO: 30.2 PG (ref 27–33)
MCHC RBC AUTO-ENTMCNC: 31.4 G/DL (ref 33.6–35)
MCV RBC AUTO: 96.2 FL (ref 81.4–97.8)
MONOCYTES # BLD AUTO: 0.56 K/UL (ref 0–0.85)
MONOCYTES NFR BLD AUTO: 7.7 % (ref 0–13.4)
NEUTROPHILS # BLD AUTO: 5.3 K/UL (ref 2–7.15)
NEUTROPHILS NFR BLD: 72.4 % (ref 44–72)
NRBC # BLD AUTO: 0 K/UL
NRBC BLD-RTO: 0 /100 WBC
PLATELET # BLD AUTO: 273 K/UL (ref 164–446)
PMV BLD AUTO: 10.7 FL (ref 9–12.9)
POTASSIUM SERPL-SCNC: 4 MMOL/L (ref 3.6–5.5)
PROT SERPL-MCNC: 6.7 G/DL (ref 6–8.2)
RBC # BLD AUTO: 5.29 M/UL (ref 4.2–5.4)
SODIUM SERPL-SCNC: 144 MMOL/L (ref 135–145)
WBC # BLD AUTO: 7.3 K/UL (ref 4.8–10.8)

## 2018-05-21 PROCEDURE — 80053 COMPREHEN METABOLIC PANEL: CPT

## 2018-05-21 PROCEDURE — 85652 RBC SED RATE AUTOMATED: CPT

## 2018-05-21 PROCEDURE — 85025 COMPLETE CBC W/AUTO DIFF WBC: CPT

## 2018-05-21 PROCEDURE — 86140 C-REACTIVE PROTEIN: CPT

## 2018-05-21 PROCEDURE — 36415 COLL VENOUS BLD VENIPUNCTURE: CPT

## 2018-05-30 ENCOUNTER — APPOINTMENT (RX ONLY)
Dept: URBAN - METROPOLITAN AREA CLINIC 4 | Facility: CLINIC | Age: 78
Setting detail: DERMATOLOGY
End: 2018-05-30

## 2018-05-30 DIAGNOSIS — L82.1 OTHER SEBORRHEIC KERATOSIS: ICD-10-CM

## 2018-05-30 PROBLEM — L57.0 ACTINIC KERATOSIS: Status: ACTIVE | Noted: 2018-05-30

## 2018-05-30 PROCEDURE — 99212 OFFICE O/P EST SF 10 MIN: CPT

## 2018-05-30 ASSESSMENT — LOCATION DETAILED DESCRIPTION DERM: LOCATION DETAILED: PERIUMBILICAL SKIN

## 2018-05-30 ASSESSMENT — LOCATION SIMPLE DESCRIPTION DERM: LOCATION SIMPLE: ABDOMEN

## 2018-05-30 ASSESSMENT — LOCATION ZONE DERM: LOCATION ZONE: TRUNK

## 2018-05-31 DIAGNOSIS — M05.742 RHEUMATOID ARTHRITIS INVOLVING BOTH HANDS WITH POSITIVE RHEUMATOID FACTOR (HCC): ICD-10-CM

## 2018-05-31 DIAGNOSIS — M05.741 RHEUMATOID ARTHRITIS INVOLVING BOTH HANDS WITH POSITIVE RHEUMATOID FACTOR (HCC): ICD-10-CM

## 2018-06-01 NOTE — TELEPHONE ENCOUNTER
Was the patient seen in the last year in this department? Yes 4/25/18 labs 5/21/18    Does patient have an active prescription for medications requested? No     Received Request Via: Pharmacy

## 2018-06-24 DIAGNOSIS — F32.A CHRONIC DEPRESSIVE DISORDER: ICD-10-CM

## 2018-06-25 DIAGNOSIS — F32.A CHRONIC DEPRESSIVE DISORDER: ICD-10-CM

## 2018-06-25 RX ORDER — CITALOPRAM HYDROBROMIDE 10 MG/1
TABLET ORAL
OUTPATIENT
Start: 2018-06-25

## 2018-06-25 RX ORDER — LEVOTHYROXINE SODIUM 175 MCG
TABLET ORAL
OUTPATIENT
Start: 2018-06-25

## 2018-06-26 RX ORDER — CITALOPRAM HYDROBROMIDE 10 MG/1
10 TABLET ORAL
Qty: 90 TAB | Refills: 2 | Status: SHIPPED | OUTPATIENT
Start: 2018-06-26 | End: 2019-07-17 | Stop reason: SDUPTHER

## 2018-06-26 RX ORDER — LEVOTHYROXINE SODIUM 175 MCG
175 TABLET ORAL EVERY MORNING
Qty: 10 TAB | Refills: 0 | Status: SHIPPED | OUTPATIENT
Start: 2018-06-26 | End: 2018-07-25 | Stop reason: SDUPTHER

## 2018-07-18 ENCOUNTER — OUTPATIENT INFUSION SERVICES (OUTPATIENT)
Dept: ONCOLOGY | Facility: MEDICAL CENTER | Age: 78
End: 2018-07-18
Attending: INTERNAL MEDICINE
Payer: MEDICARE

## 2018-07-18 VITALS
WEIGHT: 178.57 LBS | HEIGHT: 65 IN | RESPIRATION RATE: 18 BRPM | SYSTOLIC BLOOD PRESSURE: 107 MMHG | HEART RATE: 72 BPM | OXYGEN SATURATION: 94 % | BODY MASS INDEX: 29.75 KG/M2 | DIASTOLIC BLOOD PRESSURE: 89 MMHG | TEMPERATURE: 97.9 F

## 2018-07-18 DIAGNOSIS — M05.711 RHEUMATOID ARTHRITIS INVOLVING RIGHT SHOULDER WITH POSITIVE RHEUMATOID FACTOR (HCC): ICD-10-CM

## 2018-07-18 LAB
ALBUMIN SERPL BCP-MCNC: 4.1 G/DL (ref 3.2–4.9)
ALBUMIN/GLOB SERPL: 1.9 G/DL
ALP SERPL-CCNC: 76 U/L (ref 30–99)
ALT SERPL-CCNC: 17 U/L (ref 2–50)
ANION GAP SERPL CALC-SCNC: 9 MMOL/L (ref 0–11.9)
AST SERPL-CCNC: 19 U/L (ref 12–45)
BASOPHILS # BLD AUTO: 0.4 % (ref 0–1.8)
BASOPHILS # BLD: 0.03 K/UL (ref 0–0.12)
BILIRUB SERPL-MCNC: 0.6 MG/DL (ref 0.1–1.5)
BUN SERPL-MCNC: 12 MG/DL (ref 8–22)
CALCIUM SERPL-MCNC: 9.2 MG/DL (ref 8.5–10.5)
CHLORIDE SERPL-SCNC: 108 MMOL/L (ref 96–112)
CO2 SERPL-SCNC: 23 MMOL/L (ref 20–33)
CREAT SERPL-MCNC: 0.68 MG/DL (ref 0.5–1.4)
CRP SERPL HS-MCNC: 0.31 MG/DL (ref 0–0.75)
EOSINOPHIL # BLD AUTO: 0.06 K/UL (ref 0–0.51)
EOSINOPHIL NFR BLD: 0.8 % (ref 0–6.9)
ERYTHROCYTE [DISTWIDTH] IN BLOOD BY AUTOMATED COUNT: 51.4 FL (ref 35.9–50)
ERYTHROCYTE [SEDIMENTATION RATE] IN BLOOD BY WESTERGREN METHOD: 9 MM/HOUR (ref 0–30)
GLOBULIN SER CALC-MCNC: 2.2 G/DL (ref 1.9–3.5)
GLUCOSE SERPL-MCNC: 103 MG/DL (ref 65–99)
HCT VFR BLD AUTO: 48.6 % (ref 37–47)
HGB BLD-MCNC: 15.8 G/DL (ref 12–16)
IMM GRANULOCYTES # BLD AUTO: 0.03 K/UL (ref 0–0.11)
IMM GRANULOCYTES NFR BLD AUTO: 0.4 % (ref 0–0.9)
LYMPHOCYTES # BLD AUTO: 1.25 K/UL (ref 1–4.8)
LYMPHOCYTES NFR BLD: 16.8 % (ref 22–41)
MCH RBC QN AUTO: 30.3 PG (ref 27–33)
MCHC RBC AUTO-ENTMCNC: 32.5 G/DL (ref 33.6–35)
MCV RBC AUTO: 93.3 FL (ref 81.4–97.8)
MONOCYTES # BLD AUTO: 0.58 K/UL (ref 0–0.85)
MONOCYTES NFR BLD AUTO: 7.8 % (ref 0–13.4)
NEUTROPHILS # BLD AUTO: 5.48 K/UL (ref 2–7.15)
NEUTROPHILS NFR BLD: 73.8 % (ref 44–72)
NRBC # BLD AUTO: 0 K/UL
NRBC BLD-RTO: 0 /100 WBC
PLATELET # BLD AUTO: 204 K/UL (ref 164–446)
PMV BLD AUTO: 10.2 FL (ref 9–12.9)
POTASSIUM SERPL-SCNC: 4.2 MMOL/L (ref 3.6–5.5)
PROT SERPL-MCNC: 6.3 G/DL (ref 6–8.2)
RBC # BLD AUTO: 5.21 M/UL (ref 4.2–5.4)
SODIUM SERPL-SCNC: 140 MMOL/L (ref 135–145)
WBC # BLD AUTO: 7.4 K/UL (ref 4.8–10.8)

## 2018-07-18 PROCEDURE — 85025 COMPLETE CBC W/AUTO DIFF WBC: CPT

## 2018-07-18 PROCEDURE — 85652 RBC SED RATE AUTOMATED: CPT

## 2018-07-18 PROCEDURE — 700111 HCHG RX REV CODE 636 W/ 250 OVERRIDE (IP): Performed by: INTERNAL MEDICINE

## 2018-07-18 PROCEDURE — 80053 COMPREHEN METABOLIC PANEL: CPT

## 2018-07-18 PROCEDURE — A9270 NON-COVERED ITEM OR SERVICE: HCPCS | Performed by: INTERNAL MEDICINE

## 2018-07-18 PROCEDURE — 96413 CHEMO IV INFUSION 1 HR: CPT

## 2018-07-18 PROCEDURE — 700102 HCHG RX REV CODE 250 W/ 637 OVERRIDE(OP): Performed by: INTERNAL MEDICINE

## 2018-07-18 PROCEDURE — 86140 C-REACTIVE PROTEIN: CPT

## 2018-07-18 PROCEDURE — 96375 TX/PRO/DX INJ NEW DRUG ADDON: CPT

## 2018-07-18 PROCEDURE — 96415 CHEMO IV INFUSION ADDL HR: CPT

## 2018-07-18 PROCEDURE — 700105 HCHG RX REV CODE 258: Performed by: INTERNAL MEDICINE

## 2018-07-18 RX ORDER — ACETAMINOPHEN 325 MG/1
650 TABLET ORAL ONCE
Status: DISCONTINUED | OUTPATIENT
Start: 2018-07-18 | End: 2018-07-18

## 2018-07-18 RX ORDER — METHYLPREDNISOLONE SODIUM SUCCINATE 125 MG/2ML
100 INJECTION, POWDER, LYOPHILIZED, FOR SOLUTION INTRAMUSCULAR; INTRAVENOUS ONCE
Status: COMPLETED | OUTPATIENT
Start: 2018-07-18 | End: 2018-07-18

## 2018-07-18 RX ORDER — ACETAMINOPHEN 500 MG
500 TABLET ORAL ONCE
Status: COMPLETED | OUTPATIENT
Start: 2018-07-18 | End: 2018-07-18

## 2018-07-18 RX ORDER — DIPHENHYDRAMINE HCL 25 MG
25 TABLET ORAL ONCE
Status: COMPLETED | OUTPATIENT
Start: 2018-07-18 | End: 2018-07-18

## 2018-07-18 RX ADMIN — METHYLPREDNISOLONE SODIUM SUCCINATE 100 MG: 125 INJECTION, POWDER, FOR SOLUTION INTRAMUSCULAR; INTRAVENOUS at 11:33

## 2018-07-18 RX ADMIN — ACETAMINOPHEN 500 MG: 500 TABLET ORAL at 11:33

## 2018-07-18 RX ADMIN — RITUXIMAB 1000 MG: 10 INJECTION, SOLUTION INTRAVENOUS at 12:39

## 2018-07-18 RX ADMIN — DIPHENHYDRAMINE HCL 25 MG: 25 TABLET ORAL at 11:33

## 2018-07-18 ASSESSMENT — PAIN SCALES - GENERAL: PAINLEVEL_OUTOF10: 0

## 2018-07-18 NOTE — PROGRESS NOTES
Pt presents for day 1 Rituxan infusion for RA with no physical complaints. She has been receiving this every six months.  Reviewed plan of care with patient who was upset labs needed to be drawn and resulted prior to receiving treatment. PIV started and labs drawn as ordered.  Results within parameters for treatment. Rituxan infused at subsequent infusion rate and tolerated well with no sign of adverse event.  Additional purple top drawn from PIV post-infusion per lab request. PIV flushed and removed. Pt inquired about receiving Prolia injection today.  I spoke with Dr Islas who stated she would fax an order to us.  This was not received prior to patient's treatment completion so plan to admin on next visit. Also clarified lab orders with Dr Islas: labs only required for Day 1 Rituxan, no need for labs when patient returns on day 15. Pt stable and ambulatory at discharge.  She has a follow up appointment with Dr Islas next week. Plan to return to hospitals on 8/1 for day 15 Rituxan infusion and Prolia injection.

## 2018-07-18 NOTE — PROGRESS NOTES
Chemotherapy Verification - SECONDARY RN       Height = 166.7 cm  Weight = 81 kg  BSA = 1.93 m2       Medication: Rituximab (Rituxan)  Dose: fixed dose for RA  Calculated Dose: 1000 mg fixed dose                               I confirm that this process was performed independently.

## 2018-07-18 NOTE — PROGRESS NOTES
"Pharmacy Chemotherapy Verification    Gaye Antoine  Dx: RA        Protocol: RiTUXimab     RiTUXimab 1000 mg IV Days 1 and 15  [id number]  Jag HITCHCOCK, et al. N Engl J Med 2004; [id number]. Efficacy of B-Cell-Targeted Therapy with Rituximab in Patients with Rheumatoid Arthritis    Allergies: Sulfa drugs   /89   Pulse 72   Temp 36.6 °C (97.9 °F)   Resp 18   Ht 1.657 m (5' 5.25\")   Wt 81 kg (178 lb 9.2 oz)   LMP 03/01/2005   SpO2 94%   BMI 29.49 kg/m²     Body surface area is 1.93 meters squared.    Labs 7/18/18  ANC ~5480 Plt = 204k   Hgb = 15.8     SCr = 0.868 mg/dL CrCl ~84.4 mL/min  AST/ALT/AP = 19/17/76      TBili = 0.6   K+ = 4.2     12/2/2016 13:48   Hepatitis B Surface Antigen Negative   Hepatitis B Ccore Ab, Total Negative     Drug Order   (Drug name, dose, route, IV Fluid & volume, frequency, number of doses) Cycle:  4 Day 1      Previous treatment: C3D15 = 1/31/2018     Medication = RiTUXimab  Base Dose = 1000 mg  Calc Dose: N/A  Final Dose = 1000 mg  Route = IV  Fluid & Volume =  mL  Admin Duration = Per rate sheet          Fixed dose. No calulation required.  OK to treat with final dose.       By my signature below, I confirm this process was performed independently with the BSA and all final chemotherapy dosing calculations congruent. I have reviewed the above chemotherapy order and that my calculation of the final dose and BSA (when applicable) corroborate those calculations of the  pharmacist. Discrepancies of 5% or greater in the written dose have been addressed and documented within the [facility] Progress notes.    Renae Hayward, PharmD, BCOP            "

## 2018-07-18 NOTE — PROGRESS NOTES
"Pharmacy Chemotherapy Verification    Gaye Antoine  Dx: RA        Protocol: Rituximab     Rituximab 1000 mg IV Days 1 and 15  [id number]  Jag HITCHCOCK, et al. N Engl J Med 2004; [id number]. Efficacy of B-Cell-Targeted Therapy with Rituximab in Patients with Rheumatoid Arthritis       Allergies: Sulfa drugs      /89   Pulse 72   Temp 36.6 °C (97.9 °F)   Resp 18   Ht 1.657 m (5' 5.25\")   Wt 81 kg (178 lb 9.2 oz)   LMP 03/01/2005   SpO2 94%   BMI 29.49 kg/m²     Body surface area is 1.93 meters squared.    LABS 7/18/18  ANC ~5480 Plt = 204k   Hgb = 15.8     SCr = 0.68 mg/dL CrCl ~84.4 mL/min (min SCr of 0.7)  LFT's = WNL       TBili = 0.6   K+ = 4.2    9/28/16 Quantiferon Gold: negative  12/2/16 Hep B: negative     Drug Order   (Drug name, dose, route, IV Fluid & volume, frequency, number of doses) Cycle:  4  Day 1       Previous treatment: C3D15 = 1/31/2018     Medication = Rituximab  Base Dose= 1000 mg  Calc Dose: n/a  Final Dose = 1000 mg  Route = IV  Fluid & Volume =  mL  Admin Duration = per rate sheet          Fixed dose. No calulation required.  OK to treat with final dose.       By my signature below, I confirm this process was performed independently with the BSA and all final chemotherapy dosing calculations congruent. I have reviewed the above chemotherapy order and that my calculation of the final dose and BSA (when applicable) corroborate those calculations of the  pharmacist. Discrepancies of 5% or greater in the written dose have been addressed and documented within the [facility] Progress notes.    BROOKS Fernandez, PharmD            "

## 2018-07-18 NOTE — PROGRESS NOTES
Chemotherapy Verification - PRIMARY RN      Height = 165.7cm  Weight = 81kg  BSA = 1.93       Medication: Rituxan Dose: 1000mg set dose  Calculated Dose: 1000mg set dose                             (In mg/m2, AUC, mg/kg)         I confirm this process was performed independently with the BSA and all final chemotherapy dosing calculations congruent.  Any discrepancies of 5% or greater have been addressed with the chemotherapy pharmacist. The resolution of the discrepancy has been documented in the EPIC progress notes.

## 2018-07-25 ENCOUNTER — OFFICE VISIT (OUTPATIENT)
Dept: RHEUMATOLOGY | Facility: PHYSICIAN GROUP | Age: 78
End: 2018-07-25
Payer: MEDICARE

## 2018-07-25 VITALS
OXYGEN SATURATION: 91 % | RESPIRATION RATE: 16 BRPM | SYSTOLIC BLOOD PRESSURE: 110 MMHG | HEART RATE: 70 BPM | DIASTOLIC BLOOD PRESSURE: 80 MMHG | TEMPERATURE: 98.3 F | BODY MASS INDEX: 29.46 KG/M2 | WEIGHT: 178.4 LBS

## 2018-07-25 DIAGNOSIS — Z79.631 METHOTREXATE, LONG TERM, CURRENT USE: ICD-10-CM

## 2018-07-25 DIAGNOSIS — M85.9 DISORDER OF BONE DENSITY AND STRUCTURE, UNSPECIFIED: ICD-10-CM

## 2018-07-25 DIAGNOSIS — M85.80 OSTEOPENIA, UNSPECIFIED LOCATION: ICD-10-CM

## 2018-07-25 DIAGNOSIS — M05.711 RHEUMATOID ARTHRITIS INVOLVING RIGHT SHOULDER WITH POSITIVE RHEUMATOID FACTOR (HCC): ICD-10-CM

## 2018-07-25 PROCEDURE — 99213 OFFICE O/P EST LOW 20 MIN: CPT | Performed by: INTERNAL MEDICINE

## 2018-07-25 RX ORDER — LEVOTHYROXINE SODIUM 175 MCG
175 TABLET ORAL EVERY MORNING
Qty: 90 TAB | Refills: 1 | Status: SHIPPED | OUTPATIENT
Start: 2018-07-25 | End: 2019-07-17 | Stop reason: SDUPTHER

## 2018-07-25 ASSESSMENT — ENCOUNTER SYMPTOMS
CHILLS: 0
FALLS: 0
BLURRED VISION: 0
PALPITATIONS: 0
BACK PAIN: 0
FEVER: 0

## 2018-07-25 NOTE — TELEPHONE ENCOUNTER
Was the patient seen in the last year in this department? No Yes     Does patient have an active prescription for medications requested? No     Received Request Via: Patient

## 2018-07-25 NOTE — PROGRESS NOTES
Subjective:   Date of Consultation:7/25/2018  1:08 PM  Primary care physician:Sada Guillory M.D.      Reason for Consultation:  Ms. Antoine  is a pleasant 76 y.o. year old female who presented with  Follow-up for Rheumatoid Arthritis    Rheumatoid Arthritis   Our patient has been stable.  At our last visit we discussed tapering prednione to 1 mg q other day and evenutally off.  SHe has been on methotrexate 5 tabs q Wednesday and prednisone 1 mg q day.  No morning stiffness no swelling.     Current Medications  Methotrexate 5 tabs weekly q Wednesday  Folic acid  rituxan infusion 1/4/2017 and 1/18/2017 and 1/31/2018 and 1/17/2018 and 7/18/2018; next due is 8/1/2018  Prednisone 1 mg daily      RHEUM HISTORY  She was diagnosed in 1984 with presenting symptoms of burning feet.  At the time she denied rash.  EMG was not completed.  She states the burning of her feet were the only symptoms.  She tried GOLD and then prednisone.  Ms. Gaye Antoine reports she has tried several medications and only rituxan works.  She reports being on methotrexate x 30 years. She cannot recall the names of the medications    Osteopenia in spine and right femur  On prolia on 8/1/2018  Last injection was 1/17/2018  DEXA 10/23/2012 showed osteopenia with FRAX scores that are elevated > 20% and > 3 % for 10 year risk of major fracture and hip fracture respectively  Taking calcium C and vitmain D ( takes 3 pills daily)  Has not completed the DEXA ordered 4/2018      Past Medical History:   Diagnosis Date   • Cholelithiasis 2010   • Chronic constipation    • Chronic depressive disorder 1/19/2013   • Chronic use of steroids 1/19/2013   • CVA (cerebral infarction) 2008    L hemiparesis / ? 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, late effect of  cerebrovascular disease ([facility]) ([facility]) 1/19/2013   • Renal cyst 2010    R kidney   • Rheumatoid arthritis(714.0)    • S/P parathyroidectomy (CMS-HCC) ([facility]) 2012    single adenoma   • Tobacco use 1/19/2013     Past Surgical History:   Procedure Laterality Date   • PARATHYROID EXPLORATION  2/22/2012    Performed by CURTIS DUPONT at [facility]   • HIP CANNULATED SCREW  6/15/2009    Performed by DANIELLE LANG at [facility]   • ANKLE FUSION  1/8/2009    Performed by WM GONSALO HERRERA at [facility]   • OTHER  2008    submaxillary gland infection   • MAMMOPLASTY REDUCTION  2006    complicated by abscess needing I&D   • APPENDECTOMY  age 7   • CHOLECYSTECTOMY     • RHYTIDECTOMY       Allergies   Allergen Reactions   • Sulfa Drugs      Not sure     Outpatient Encounter Prescriptions as of 7/25/2018   Medication Sig Dispense Refill   • citalopram (CELEXA) 10 MG tablet Take 1 Tab by mouth every day. 90 Tab 2   • methotrexate 2.5 MG Tab Take 6 tablets by mouth  every 7 days lab due before 8/21//2018 for refills 72 Tab 0   • folic acid (FOLVITE) 1 MG Tab Take 1 Tab by mouth every day. 30 Tab 11   • Calcium Citrate-Vitamin D (CALCIUM + D PO) Take  by mouth.     • Omega-3 Fatty Acids (FISH OIL) 1000 MG Cap capsule Take 1,000 mg by mouth 3 times a day, with meals.     • [DISCONTINUED] SYNTHROID 175 MCG Tab Take 1 Tab by mouth every morning. ON A EMPTY STOMACH 10 Tab 0   • rosuvastatin (CRESTOR) 10 MG Tab TAKE 1 TABLET BY MOUTH  DAILY 90 Tab 0   • predniSONE (DELTASONE) 1 MG Tab 1 mg po q day decrease to 1 mg q other day if possible 30 Tab 1     No facility-administered encounter medications on file as of 7/25/2018.      Social History     Social History   • Marital status:      Spouse name: N/A   • Number of children: 1   • Years of education: some colle     Occupational History   • [employer] Retired     Social History Main Topics   • Smoking  status: Current Some Day Smoker     Packs/day: 0.50     Years: 50.00     Types: Cigarettes   • Smokeless tobacco: Never Used      Comment: Previous heavy 1 ppd smoker.    • Alcohol use 0.0 oz/week      Comment: rare   • Drug use: No   • Sexual activity: Not Currently      Comment:       Other Topics Concern   • Not on file     Social History Narrative    Retired  for [employer].         49 yo son.    No grandchildren.    Sister with PCKD may have been 1/2 sister (family secret)      History   Smoking Status   • Current Some Day Smoker   • Packs/day: 0.50   • Years: 50.00   • Types: Cigarettes   Smokeless Tobacco   • Never Used     Comment: Previous heavy 1 ppd smoker.      History   Alcohol Use   • 0.0 oz/week     Comment: rare     History   Drug Use No      OB History    Para Term  AB Living   1 1           SAB TAB Ectopic Molar Multiple Live Births                    # Outcome Date GA Lbr Khanh/2nd Weight Sex Delivery Anes PTL Lv   1 Para                  Patient's last menstrual period was 2005.    G P A [facility]     Family History   Problem Relation Age of Onset   • Genitourinary () Sister         polycystic kidneys   • Cancer Mother         breast   • Lung Disease Mother         TB   • Heart Disease Father         aneurysm       Review of Systems   Constitutional: Negative for chills and fever.   Eyes: Negative for blurred vision.   Cardiovascular: Negative for chest pain and palpitations.   Musculoskeletal: Negative for back pain, falls and joint pain.        Objective:   /80   Pulse 70   Temp 36.8 °C (98.3 °F)   Resp 16   Wt 80.9 kg (178 lb 6.4 oz)   LMP 2005   SpO2 91%   BMI 29.46 kg/m²     Physical Exam   Constitutional: She is oriented to person, place, and time. She appears well-developed and well-nourished. No distress.   HENT:   Head: Normocephalic and atraumatic.   Right Ear: External ear normal.   Left Ear: External ear  normal.   Eyes: Conjunctivae are normal. Right eye exhibits no discharge. Left eye exhibits no discharge. No scleral icterus.   Cardiovascular: Normal rate, regular rhythm and normal heart sounds.    Pulmonary/Chest: No stridor. No respiratory distress. She has no wheezes.   Abdominal: Soft. She exhibits no distension. There is no tenderness.   No hepatosplenomegaly   Musculoskeletal: She exhibits no edema.   No synovitis appreciated in MCPs, PIPs or DIPs. No synovitis she and the wrists bilateral.  No MTP tenderness bilateral   Neurological: She is alert and oriented to person, place, and time.   Skin: Skin is warm and dry. She is not diaphoretic.   Psychiatric: She has a normal mood and affect. Her behavior is normal. Judgment and thought content normal.       Assessment:     1. Methotrexate, long term, current use  CBC WITH DIFFERENTIAL    CREATININE    CRP QUANTITIVE (NON-CARDIAC)    WESTERGREN SED RATE    HEPATIC FUNCTION PANEL    BUN   2. Rheumatoid arthritis involving right shoulder with positive rheumatoid factor (HCC)       Labs:  Scooby last injection  Lab Results   Component Value Date/Time    QNTTBGOLD Negative 09/28/2016 12:19 PM     Lab Results   Component Value Date/Time    HEPBCORTOT Negative 12/02/2016 01:48 PM    HEPBSAG Negative 12/02/2016 01:48 PM     Lab Results   Component Value Date/Time    HEPCAB Negative 12/02/2016 01:48 PM     Lab Results   Component Value Date/Time    SODIUM 140 07/18/2018 10:16 AM    POTASSIUM 4.2 07/18/2018 10:16 AM    CHLORIDE 108 07/18/2018 10:16 AM    CO2 23 07/18/2018 10:16 AM    GLUCOSE 103 (H) 07/18/2018 10:16 AM    BUN 12 07/18/2018 10:16 AM    CREATININE 0.68 07/18/2018 10:16 AM    CREATININE 0.8 12/31/2008 02:01 PM      Lab Results   Component Value Date/Time    WBC 7.4 07/18/2018 10:16 AM    RBC 5.21 07/18/2018 10:16 AM    HEMOGLOBIN 15.8 07/18/2018 10:16 AM    HEMATOCRIT 48.6 (H) 07/18/2018 10:16 AM    MCV 93.3 07/18/2018 10:16 AM    MCH 30.3 07/18/2018 10:16  AM    MCHC 32.5 (L) 07/18/2018 10:16 AM    MPV 10.2 07/18/2018 10:16 AM    NEUTSPOLYS 73.80 (H) 07/18/2018 10:16 AM    LYMPHOCYTES 16.80 (L) 07/18/2018 10:16 AM    MONOCYTES 7.80 07/18/2018 10:16 AM    EOSINOPHILS 0.80 07/18/2018 10:16 AM    BASOPHILS 0.40 07/18/2018 10:16 AM    HYPOCHROMIA 1+ 05/09/2014 12:01 PM    ANISOCYTOSIS 1+ 06/10/2008 05:00 AM      Lab Results   Component Value Date/Time    CALCIUM 9.2 07/18/2018 10:16 AM    ASTSGOT 19 07/18/2018 10:16 AM    ALTSGPT 17 07/18/2018 10:16 AM    ALKPHOSPHAT 76 07/18/2018 10:16 AM    TBILIRUBIN 0.6 07/18/2018 10:16 AM    ALBUMIN 4.1 07/18/2018 10:16 AM    TOTPROTEIN 6.3 07/18/2018 10:16 AM     Lab Results   Component Value Date/Time    RHEUMFACTN >360 (H) 11/28/2017 02:21 PM    CCPANTIBODY 106 (H) 09/28/2016 12:19 PM    ANTINUCAB NONE DET 06/12/2008 06:15 AM     Lab Results   Component Value Date/Time    SEDRATEWES 9 07/18/2018 03:55 PM    CREACTPROT 0.31 07/18/2018 10:16 AM     No results found for: RUSSELVIPER, DRVVTINTERP  No results found for: R5KARXWOONA, N3ULQCXJBRH, IGGCARDIOLI, IGMCARDIOLI, IGACARDIOLI, CRYOGLOBULIN  Lab Results   Component Value Date/Time    ANTIDNADS None Detected 09/28/2016 12:19 PM    RNPAB 0 09/28/2016 12:19 PM    SMITHAB 0 09/28/2016 12:19 PM    FSDYSBD69 0 09/28/2016 12:19 PM    CENTROMBAB 2 09/28/2016 12:19 PM     Lab Results   Component Value Date/Time    ANTIDNADS None Detected 09/28/2016 12:19 PM    RNPAB 0 09/28/2016 12:19 PM    ANTISSBSJ 0 09/28/2016 12:19 PM     Lab Results   Component Value Date/Time    COLORURINE Lt. Yellow 05/19/2015 02:30 PM    SPECGRAVITY 1.015 05/19/2015 02:30 PM    PHURINE 5.5 05/19/2015 02:30 PM    GLUCOSEUR Negative 05/19/2015 02:30 PM    KETONES Negative 05/19/2015 02:30 PM    PROTEINURIN Negative 05/19/2015 02:30 PM     Lab Results   Component Value Date/Time    TOTALVOLUME 1500 12/21/2011 12:00 AM     Lab Results   Component Value Date/Time    SSA60 1 09/28/2016 12:19 PM    SSA52 4 09/28/2016  12:19 PM     Lab Results   Component Value Date/Time    HBA1C 5.5 05/09/2014 11:59 AM     Lab Results   Component Value Date/Time    CPKTOTAL 69 09/28/2016 12:19 PM     No results found for: G6PD  No results found for: EDXV93AXWL  No results found for: ACESERUM  Lab Results   Component Value Date/Time    25HYDROXY 25 (L) 05/09/2014 11:59 AM     No results found for: TSH, FREEDIR  Lab Results   Component Value Date/Time    TSHULTRASEN 1.630 03/11/2016 03:07 PM    FREET4 1.60 (H) 03/11/2016 03:07 PM     No results found for: MICROSOMALA, ANTITHYROGL  No results found for: IGGLYMEABS  No results found for: ANTIMITOCHO, FACTIN  No results found for: IGA, TTRANSIGA, ENDOIGA  No results found for: FLTYPE, CRYSTALSBDF  Lab Results   Component Value Date/Time    ISTATICAL 1.14 01/18/2017 09:14 AM     Lab Results   Component Value Date/Time    ISTATCREAT 0.6 01/18/2017 09:14 AM     No results found for: CTELOPEP  No results found for: GBMABG  Lab Results   Component Value Date/Time    PTHINTACT 24.6 08/06/2013 04:16 PM         Medical Decision Making:  Today's Assessment / Status / Plan:     Rheumatoid Arthritis, seropositive  She is doing well.  We will go down to methotrexate 3 tabs weekly and continue rituximab.  Of predisone x 2 weeks.  Provided precautions that should she develop symptoms to return back to an appointment.    Osteopenia  Continue injections.  DEXA discussion  Advise patient to get DEXA completed    Methotrexate monitoring  CBC and CMP monitored every 3 months    1. Methotrexate, long term, current use  CBC WITH DIFFERENTIAL    CREATININE    CRP QUANTITIVE (NON-CARDIAC)    WESTERGREN SED RATE    HEPATIC FUNCTION PANEL    BUN   2. Rheumatoid arthritis involving right shoulder with positive rheumatoid factor (HCC)         Return in about 3 months (around 10/25/2018).          She agreed and verbalized her agreement and understanding with the current plan. I answered all questions and concerns she has at  this time and advised her to call at any time in there interim with questions or concerns in regards to her care.    Thank you for allowing me to participate in her care, I will continue to follow closely.     Please note that this dictation was created using voice recognition software. I have made every reasonable attempt to correct obvious errors, but I expect that there are errors of grammar and possibly content that I did not discover before finalizing the note.

## 2018-07-29 NOTE — PROGRESS NOTES
"Pharmacy Chemotherapy Verification    Gaye Antoine  Dx: RA        Protocol: Rituximab    *Dosing Reference*   Rituximab 1000 mg IV Days 1 and 15  [id number]  Jag HITCHCOCK, et al. N Engl J Med 2004; 350:4388-5582. Efficacy of B-Cell-Targeted Therapy with Rituximab in Patients with Rheumatoid Arthritis     Allergies: Sulfa drugs      /91   Pulse 78   Temp 36.4 °C (97.6 °F)   Resp 18   Ht 1.657 m (5' 5.24\")   Wt 81 kg (178 lb 9.2 oz)   LMP 03/01/2005   SpO2 92%   BMI 29.50 kg/m²     Body surface area is 1.93 meters squared.    9/28/16 Quantiferon Gold: negative  12/2/16 Hep B: negative    Labs 7/18/18:  ANC~ 5480   Plt = 204 k     Hgb = 15.8  SCr = 0.68 mg/dL  CrCl ~ 84 mL/min (min SCr 0.7 used)  AST/ALT/AP = 19/17/76 TBili = 0.6        Drug Order   (Drug name, dose, route, IV Fluid & volume, frequency, number of doses) Cycle 4,  Day 15 - PAPER ORDERS IN CHART  Previous treatment: C4D1 = 7/18/18   Medication = Rituximab (Rituxan)  Base Dose= 1000 mg fixed dose  Calc Dose: no calculation required  Final Dose = 1000 mg  Route = IV  Fluid & Volume =  mL  Admin Duration = per rate sheet          <5% difference, okay to treat with final dose     By my signature below, I confirm this process was performed independently with the BSA and all final chemotherapy dosing calculations congruent. I have reviewed the above chemotherapy order and that my calculation of the final dose and BSA (when applicable) corroborate those calculations of the  pharmacist. Discrepancies of 5% or greater in the written dose have been addressed and documented within the [facility] Progress notes.      Katja Ruelas, PharmD  "

## 2018-08-01 ENCOUNTER — OUTPATIENT INFUSION SERVICES (OUTPATIENT)
Dept: ONCOLOGY | Facility: MEDICAL CENTER | Age: 78
End: 2018-08-01
Attending: INTERNAL MEDICINE
Payer: MEDICARE

## 2018-08-01 VITALS
WEIGHT: 178.57 LBS | SYSTOLIC BLOOD PRESSURE: 133 MMHG | TEMPERATURE: 97.6 F | OXYGEN SATURATION: 92 % | DIASTOLIC BLOOD PRESSURE: 91 MMHG | BODY MASS INDEX: 29.75 KG/M2 | HEIGHT: 65 IN | RESPIRATION RATE: 18 BRPM | HEART RATE: 78 BPM

## 2018-08-01 LAB
CA-I BLD ISE-SCNC: 1.02 MMOL/L (ref 1.1–1.3)
CREAT BLD-MCNC: 0.8 MG/DL (ref 0.5–1.4)

## 2018-08-01 PROCEDURE — 700102 HCHG RX REV CODE 250 W/ 637 OVERRIDE(OP): Performed by: INTERNAL MEDICINE

## 2018-08-01 PROCEDURE — 82565 ASSAY OF CREATININE: CPT

## 2018-08-01 PROCEDURE — 82330 ASSAY OF CALCIUM: CPT

## 2018-08-01 PROCEDURE — 96375 TX/PRO/DX INJ NEW DRUG ADDON: CPT

## 2018-08-01 PROCEDURE — 96415 CHEMO IV INFUSION ADDL HR: CPT

## 2018-08-01 PROCEDURE — 700105 HCHG RX REV CODE 258: Performed by: INTERNAL MEDICINE

## 2018-08-01 PROCEDURE — A9270 NON-COVERED ITEM OR SERVICE: HCPCS | Performed by: INTERNAL MEDICINE

## 2018-08-01 PROCEDURE — 700111 HCHG RX REV CODE 636 W/ 250 OVERRIDE (IP): Mod: JG | Performed by: INTERNAL MEDICINE

## 2018-08-01 PROCEDURE — 96401 CHEMO ANTI-NEOPL SQ/IM: CPT | Mod: XU

## 2018-08-01 PROCEDURE — 96413 CHEMO IV INFUSION 1 HR: CPT

## 2018-08-01 RX ORDER — ACETAMINOPHEN 500 MG
500 TABLET ORAL ONCE
Status: COMPLETED | OUTPATIENT
Start: 2018-08-01 | End: 2018-08-01

## 2018-08-01 RX ORDER — METHYLPREDNISOLONE SODIUM SUCCINATE 125 MG/2ML
100 INJECTION, POWDER, LYOPHILIZED, FOR SOLUTION INTRAMUSCULAR; INTRAVENOUS ONCE
Status: COMPLETED | OUTPATIENT
Start: 2018-08-01 | End: 2018-08-01

## 2018-08-01 RX ORDER — DIPHENHYDRAMINE HCL 25 MG
25 TABLET ORAL ONCE
Status: COMPLETED | OUTPATIENT
Start: 2018-08-01 | End: 2018-08-01

## 2018-08-01 RX ADMIN — ACETAMINOPHEN 500 MG: 500 TABLET ORAL at 10:33

## 2018-08-01 RX ADMIN — RITUXIMAB 1000 MG: 10 INJECTION, SOLUTION INTRAVENOUS at 11:20

## 2018-08-01 RX ADMIN — DIPHENHYDRAMINE HCL 25 MG: 25 TABLET ORAL at 10:33

## 2018-08-01 RX ADMIN — METHYLPREDNISOLONE SODIUM SUCCINATE 100 MG: 125 INJECTION, POWDER, FOR SOLUTION INTRAMUSCULAR; INTRAVENOUS at 10:34

## 2018-08-01 RX ADMIN — DENOSUMAB 60 MG: 60 INJECTION SUBCUTANEOUS at 11:55

## 2018-08-01 ASSESSMENT — PAIN SCALES - GENERAL: PAINLEVEL_OUTOF10: 0

## 2018-08-01 NOTE — PROGRESS NOTES
Here for IV infusion of Rituxan Day 15 and Prolia for Rheumatoid Arthritis / Osteoporosis. In good spirits, voices no new complaints. Teaching and education reinforcement provided, also emotional support. Prolia given in right upper arm SQ. Treatment well tolerated, without complications. No sign or symptoms of reaction observed or expressed. Discharge home to self care in [facility]. Appointment given for next treatment.

## 2018-08-01 NOTE — PROGRESS NOTES
"Pharmacy Chemotherapy Verification    Patient Name: Gaye Antoine  Dx: Rheumatoid arthritis  Cycle: 4 at [facility], Day 1  Previous treatment = C3D15 1/31/18    Regimen and Dosing Reference  Rituximab 1000 mg IV Days 1 and 15  Q6 months  Jag HITCHCOCK, et al. N Engl J Med 2004; 350:6651-6809. Efficacy of B-Cell-Targeted Therapy with Rituximab in Patients with Rheumatoid Arthritis    Allergies: Sulfa drugs  /91   Pulse 78   Temp 36.4 °C (97.6 °F)   Resp 18   Ht 1.657 m (5' 5.24\")   Wt 81 kg (178 lb 9.2 oz)   LMP 03/01/2005   SpO2 92%   BMI 29.50 kg/m²   Body surface area is 1.93 meters squared.    Labs 7/18/18  ANC ~5480     Plt = 204k          Hgb = 15.8        AST/ALT/AP = 19/17/76      TBili = 0.6           K+ = 4.2    8/1/18   iCa 2+ = 1.02 (pt has not been taking calcium supplements. Pt educated that she needs to get a minimum of 1000 mg po qday or 500 mg po bid)  Scr = 0.8  Est CrCl ~ 47 ml/min      Labs 12/2/16 12/2/2016 13:48   Hepatitis B Surface Antigen Negative   Hepatitis B Ccore Ab, Total Negative   Hepatitis C Antibody Negative     Rituximab 1000 mg IV fixed dose   No calculation required   OK to treat with final dose = 1000 mg IV     Prolia 60 mg SQ given 8/1/18    Milagros Kaplan, PharmD, BCOP         "

## 2018-08-01 NOTE — PROGRESS NOTES
Chemotherapy Verification - PRIMARY RN      Height = 65.24 in  Weight = 178 lb  BSA = 1.93 m2       Medication: Rituxan  Dose: 1000 mg  Calculated Dose: 1000 mg / Fixed dose for RA                             (In mg/m2, AUC, mg/kg)       I confirm this process was performed independently with the BSA and all final chemotherapy dosing calculations congruent.  Any discrepancies of 5% or greater have been addressed with the chemotherapy pharmacist. The resolution of the discrepancy has been documented in the EPIC progress notes.

## 2018-08-01 NOTE — PROGRESS NOTES
Chemotherapy Verification - SECONDARY RN       Height = 65.24 in  Weight = 81 kg  BSA = 1.93 m2       Medication: Rituxan  Dose: SET DOSE  Calculated Dose: 1000 mg                             (In mg/m2, AUC, mg/kg)     I confirm that this process was performed independently.

## 2018-10-02 ENCOUNTER — APPOINTMENT (OUTPATIENT)
Dept: RHEUMATOLOGY | Facility: MEDICAL CENTER | Age: 78
End: 2018-10-02
Payer: MEDICARE

## 2018-10-25 ENCOUNTER — TELEPHONE (OUTPATIENT)
Dept: RHEUMATOLOGY | Facility: MEDICAL CENTER | Age: 78
End: 2018-10-25

## 2018-10-25 NOTE — TELEPHONE ENCOUNTER
Patient cancelled last appt with Dr. Islas as pt was ill. I called to reschedule her with Dr. Obrien and patient has declined at this time. I invited her to call back if she changes her mind. NK

## 2018-12-05 NOTE — PROGRESS NOTES
Chemotherapy Verification - SECONDARY RN       Height = 166cm  Weight = 80.6kg  BSA = 1.93m2       Medication: Rituximab  Dose: 1000mg fixed dose  Calculated Dose: 1000mg fixed dose                             (In mg/m2, AUC, mg/kg)         I confirm that this process was performed independently.    Telephone Encounter by Coraozn Krueger at 05/14/18 10:52 AM     Author:  Corazon Krueger Service:  (none) Author Type:  Patient      Filed:  05/14/18 10:55 AM Encounter Date:  5/12/2018 Status:  Signed     :  Corazon Krueger (Patient )            Called pt and caregiver answered, attempted to speak w/ pt to schedule appt. Caregiver stated all calls need to go to son Duane, ( dad's POA ). Caregiver refd son Duane's phone number to call .[TT1.1M]    Left message on answering machine to call back.[TT1.1T]               Revision History        User Key Date/Time User Provider Type Action    > TT1.1 05/14/18 10:55 AM Corazon Krueger Patient  Sign    M - Manual, T - Template

## 2019-01-22 DIAGNOSIS — M06.9 RHEUMATOID ARTHRITIS, INVOLVING UNSPECIFIED SITE, UNSPECIFIED RHEUMATOID FACTOR PRESENCE: ICD-10-CM

## 2019-01-23 ENCOUNTER — APPOINTMENT (OUTPATIENT)
Dept: ONCOLOGY | Facility: MEDICAL CENTER | Age: 79
End: 2019-01-23
Attending: INTERNAL MEDICINE
Payer: MEDICARE

## 2019-01-23 ENCOUNTER — TELEPHONE (OUTPATIENT)
Dept: RHEUMATOLOGY | Facility: MEDICAL CENTER | Age: 79
End: 2019-01-23

## 2019-01-23 NOTE — TELEPHONE ENCOUNTER
Yolis from infusion services spoke with patient. Patient states that she cannot come to the appointment that we offered her tomorrow. Are there any other dates that you would recommend? I will call patient to arrange.       Routed to Dr. Obrien for review    Side note: patient is scheduled for Rituxan infusion on 2/6/19 and will also be due for Prolia on that date. Will need to request orders for Rituxan and Prolia at her next office visit.

## 2019-01-23 NOTE — PROGRESS NOTES
RN spoke with Dr. Obrien's office and pt will need to see provider prior to treatment, order for labs placed in EPIC by provider. Triage RN called pt and confirmed that pt will not have day1 tomorrow, she verbalized understanding. Pt is aware that she needs to have her labs drawn and reports that she can not make f/u appt on 1/24, this RN will contact MD office to discuss another appt time for pt.

## 2019-01-29 ENCOUNTER — HOSPITAL ENCOUNTER (OUTPATIENT)
Dept: LAB | Facility: MEDICAL CENTER | Age: 79
End: 2019-01-29
Attending: INTERNAL MEDICINE
Payer: MEDICARE

## 2019-01-29 DIAGNOSIS — M06.9 RHEUMATOID ARTHRITIS, INVOLVING UNSPECIFIED SITE, UNSPECIFIED RHEUMATOID FACTOR PRESENCE: ICD-10-CM

## 2019-01-29 LAB
ALBUMIN SERPL BCP-MCNC: 4 G/DL (ref 3.2–4.9)
ALBUMIN/GLOB SERPL: 1.8 G/DL
ALP SERPL-CCNC: 87 U/L (ref 30–99)
ALT SERPL-CCNC: 17 U/L (ref 2–50)
ANION GAP SERPL CALC-SCNC: 9 MMOL/L (ref 0–11.9)
AST SERPL-CCNC: 16 U/L (ref 12–45)
BASOPHILS # BLD AUTO: 0.3 % (ref 0–1.8)
BASOPHILS # BLD: 0.03 K/UL (ref 0–0.12)
BILIRUB SERPL-MCNC: 0.4 MG/DL (ref 0.1–1.5)
BUN SERPL-MCNC: 17 MG/DL (ref 8–22)
CALCIUM SERPL-MCNC: 9.7 MG/DL (ref 8.5–10.5)
CHLORIDE SERPL-SCNC: 109 MMOL/L (ref 96–112)
CO2 SERPL-SCNC: 26 MMOL/L (ref 20–33)
CREAT SERPL-MCNC: 0.68 MG/DL (ref 0.5–1.4)
CRP SERPL HS-MCNC: 0.44 MG/DL (ref 0–0.75)
EOSINOPHIL # BLD AUTO: 0.04 K/UL (ref 0–0.51)
EOSINOPHIL NFR BLD: 0.4 % (ref 0–6.9)
ERYTHROCYTE [DISTWIDTH] IN BLOOD BY AUTOMATED COUNT: 48.3 FL (ref 35.9–50)
ERYTHROCYTE [SEDIMENTATION RATE] IN BLOOD BY WESTERGREN METHOD: 6 MM/HOUR (ref 0–30)
GLOBULIN SER CALC-MCNC: 2.2 G/DL (ref 1.9–3.5)
GLUCOSE SERPL-MCNC: 81 MG/DL (ref 65–99)
HCT VFR BLD AUTO: 48.8 % (ref 37–47)
HGB BLD-MCNC: 15.6 G/DL (ref 12–16)
IMM GRANULOCYTES # BLD AUTO: 0.04 K/UL (ref 0–0.11)
IMM GRANULOCYTES NFR BLD AUTO: 0.4 % (ref 0–0.9)
LYMPHOCYTES # BLD AUTO: 1.63 K/UL (ref 1–4.8)
LYMPHOCYTES NFR BLD: 17.5 % (ref 22–41)
MCH RBC QN AUTO: 29.6 PG (ref 27–33)
MCHC RBC AUTO-ENTMCNC: 32 G/DL (ref 33.6–35)
MCV RBC AUTO: 92.6 FL (ref 81.4–97.8)
MONOCYTES # BLD AUTO: 0.87 K/UL (ref 0–0.85)
MONOCYTES NFR BLD AUTO: 9.3 % (ref 0–13.4)
NEUTROPHILS # BLD AUTO: 6.72 K/UL (ref 2–7.15)
NEUTROPHILS NFR BLD: 72.1 % (ref 44–72)
NRBC # BLD AUTO: 0 K/UL
NRBC BLD-RTO: 0 /100 WBC
PLATELET # BLD AUTO: 248 K/UL (ref 164–446)
PMV BLD AUTO: 10.7 FL (ref 9–12.9)
POTASSIUM SERPL-SCNC: 4 MMOL/L (ref 3.6–5.5)
PROT SERPL-MCNC: 6.2 G/DL (ref 6–8.2)
RBC # BLD AUTO: 5.27 M/UL (ref 4.2–5.4)
SODIUM SERPL-SCNC: 144 MMOL/L (ref 135–145)
WBC # BLD AUTO: 9.3 K/UL (ref 4.8–10.8)

## 2019-01-29 PROCEDURE — 80053 COMPREHEN METABOLIC PANEL: CPT

## 2019-01-29 PROCEDURE — 85652 RBC SED RATE AUTOMATED: CPT

## 2019-01-29 PROCEDURE — 85025 COMPLETE CBC W/AUTO DIFF WBC: CPT

## 2019-01-29 PROCEDURE — 36415 COLL VENOUS BLD VENIPUNCTURE: CPT

## 2019-01-29 PROCEDURE — 86140 C-REACTIVE PROTEIN: CPT

## 2019-01-30 ENCOUNTER — OFFICE VISIT (OUTPATIENT)
Dept: RHEUMATOLOGY | Facility: MEDICAL CENTER | Age: 79
End: 2019-01-30
Payer: MEDICARE

## 2019-01-30 VITALS
BODY MASS INDEX: 28.82 KG/M2 | WEIGHT: 173 LBS | DIASTOLIC BLOOD PRESSURE: 76 MMHG | HEIGHT: 65 IN | TEMPERATURE: 98.2 F | SYSTOLIC BLOOD PRESSURE: 130 MMHG | OXYGEN SATURATION: 88 % | HEART RATE: 93 BPM

## 2019-01-30 DIAGNOSIS — Z79.899 HIGH RISK MEDICATION USE: ICD-10-CM

## 2019-01-30 DIAGNOSIS — Z79.631 METHOTREXATE, LONG TERM, CURRENT USE: ICD-10-CM

## 2019-01-30 DIAGNOSIS — M06.9 RHEUMATOID ARTHRITIS, INVOLVING UNSPECIFIED SITE, UNSPECIFIED RHEUMATOID FACTOR PRESENCE: Primary | ICD-10-CM

## 2019-01-30 DIAGNOSIS — M85.80 OSTEOPENIA, UNSPECIFIED LOCATION: ICD-10-CM

## 2019-01-30 DIAGNOSIS — M85.89 OTHER SPECIFIED DISORDERS OF BONE DENSITY AND STRUCTURE, MULTIPLE SITES: ICD-10-CM

## 2019-01-30 DIAGNOSIS — Z79.899 ENCOUNTER FOR LONG-TERM (CURRENT) USE OF HIGH-RISK MEDICATION: ICD-10-CM

## 2019-01-30 PROCEDURE — 99214 OFFICE O/P EST MOD 30 MIN: CPT | Performed by: INTERNAL MEDICINE

## 2019-01-30 RX ORDER — FOLIC ACID 1 MG/1
1 TABLET ORAL DAILY
Qty: 30 TAB | Refills: 11 | Status: SHIPPED | OUTPATIENT
Start: 2019-01-30 | End: 2021-12-06

## 2019-01-30 RX ORDER — VITS A,C,E/LUTEIN/MINERALS 300MCG-200
1 TABLET ORAL DAILY
COMMUNITY
End: 2021-03-25

## 2019-01-30 NOTE — PROGRESS NOTES
RHEUMATOLOGY CLINIC FOLLOW UP NOTE        Chief complaint:  Follow up        HPI:  79 y/o F with RA presents today for follow up, she is a new patient to me, previously followed with Dr. Islas, last seen 7/2018.    Remains on Rtixuan, MTX 2 tabs weekly (decreased from 5 tabs at the last visit) and is now off prednisone since about 8/2018.  She reports she has no pain or morning stiffness at this time.  Denies rashes, fevers, sotmach upset, or infusion reactions.  She is interested in getting off of the MTX.  Denies any infections.    She is on Rituxan,  last infusions 7/18 and 8/1/18.    Of note, patient does seem to be declining mentally over time however is able to reinforce her management plan and understands her treatment regimen.     Rheum Background:  She was diagnosed in 1984 with presenting symptoms of burning feet.  At the time she denied rash.  EMG was not completed.  She states the burning of her feet were the only symptoms.  She tried GOLD and then prednisone.  Reports she has tried several medications and only rituxan works.  She reports being on methotrexate x 30 years. She cannot recall the names of the medications      Rituxan: 1/4/2017 and 1/18/2017 and 1/31/2018 and 1/17/2018 and 7/18/2018 and 8/1/2018    Of prednisone since about 8/2018     Osteopenia in spine and right femur  On prolia  DEXA 10/23/2012 showed osteopenia with FRAX scores that are elevated > 20% and > 3 % for 10 year risk of major fracture and hip fracture respectively  Taking calcium C and vitmain D ( takes 3 pills daily)        ROS:    GEN: denies fevers  CV:  no palpitations, no chest dyscomfor  Pulm: no dyspnea, no cough  GI: no diarrhea, nausea or vomiting  Skin: no rashes currently  MS: no back pain, no joint swelling            OUTPATIENT MEDS:    Prior to Admission medications    Medication Sig Start Date End Date Taking? Authorizing Provider   Multiple Vitamins-Minerals (OCUVITE-LUTEIN) Tab Take 1 tablet by mouth every day.    Yes Physician Outpatient   SYNTHROID 175 MCG Tab Take 1 Tab by mouth every morning. ON A EMPTY STOMACH 7/25/18  Yes Kvng Zabala M.D.   citalopram (CELEXA) 10 MG tablet Take 1 Tab by mouth every day. 6/26/18  Yes Kvng Zabala M.D.   methotrexate 2.5 MG Tab Take 6 tablets by mouth  every 7 days lab due before 8/21//2018 for refills  Patient taking differently: Take 6 tablets by mouth  every 7 days lab due before 8/21//2018 for refills 6/1/18  Yes Sophie Islas M.D.   folic acid (FOLVITE) 1 MG Tab Take 1 Tab by mouth every day. 3/28/17  Yes Sophie Islas M.D.   Calcium Citrate-Vitamin D (CALCIUM + D PO) Take  by mouth.   Yes Physician Outpatient   Omega-3 Fatty Acids (FISH OIL) 1000 MG Cap capsule Take 1,000 mg by mouth 3 times a day, with meals.   Yes Physician Outpatient   rosuvastatin (CRESTOR) 10 MG Tab TAKE 1 TABLET BY MOUTH  DAILY  Patient not taking: Reported on 1/30/2019 1/30/18   Kvng Zabala M.D.           Past Medical History:   Diagnosis Date   • Cholelithiasis 2010   • Chronic constipation    • Chronic depressive disorder 1/19/2013   • Chronic use of steroids 1/19/2013   • CVA (cerebral infarction) 2008    L hemiparesis / ? 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  She has a 25.00 pack-year smoking history. She has never used smokeless tobacco. She reports that she drinks alcohol. She reports that she does not use drugs.             Physical Exam:     /76 (BP Location: Left arm, Patient Position:  "Sitting, BP Cuff Size: Adult)   Pulse 93   Temp 36.8 °C (98.2 °F) (Temporal)   Ht 1.651 m (5' 5\")   Wt 78.5 kg (173 lb)   LMP 03/01/2005   SpO2 88%   BMI 28.79 kg/m²         GEN: well-appearing, NAD  Head: no focal alopecia, no hair thinning  ENT: no conjunctival icterus or injection, no LAD, no oral ulcers  Pulm: normal chest expansion, speaking in full sentences  MUSCUSKELETAL:  no edema, dactylitis, entesitis      SHOULDERs: no tenderness  ELBOWS:  no tenderness no synovitis  WRISTS:   no tenderness no synovitis  MCPS:   no tenderness no synovitis  PIPS:    no tenderness no synovitis  DIPS: no tenderness no synovitis  KNEES:  no tenderness no synovitis              ANKLES:  no tenderness no synovitis, chronic puffiness of left ankle           SKIN: no rashes, skin changes, nail changes, no periungual erythema.               Labs:    Results for orders placed or performed during the hospital encounter of 01/29/19   CBC WITH DIFFERENTIAL   Result Value Ref Range    WBC 9.3 4.8 - 10.8 K/uL    RBC 5.27 4.20 - 5.40 M/uL    Hemoglobin 15.6 12.0 - 16.0 g/dL    Hematocrit 48.8 (H) 37.0 - 47.0 %    MCV 92.6 81.4 - 97.8 fL    MCH 29.6 27.0 - 33.0 pg    MCHC 32.0 (L) 33.6 - 35.0 g/dL    RDW 48.3 35.9 - 50.0 fL    Platelet Count 248 164 - 446 K/uL    MPV 10.7 9.0 - 12.9 fL    Neutrophils-Polys 72.10 (H) 44.00 - 72.00 %    Lymphocytes 17.50 (L) 22.00 - 41.00 %    Monocytes 9.30 0.00 - 13.40 %    Eosinophils 0.40 0.00 - 6.90 %    Basophils 0.30 0.00 - 1.80 %    Immature Granulocytes 0.40 0.00 - 0.90 %    Nucleated RBC 0.00 /100 WBC    Neutrophils (Absolute) 6.72 2.00 - 7.15 K/uL    Lymphs (Absolute) 1.63 1.00 - 4.80 K/uL    Monos (Absolute) 0.87 (H) 0.00 - 0.85 K/uL    Eos (Absolute) 0.04 0.00 - 0.51 K/uL    Baso (Absolute) 0.03 0.00 - 0.12 K/uL    Immature Granulocytes (abs) 0.04 0.00 - 0.11 K/uL    NRBC (Absolute) 0.00 K/uL   COMP METABOLIC PANEL   Result Value Ref Range    Sodium 144 135 - 145 mmol/L    Potassium 4.0 " 3.6 - 5.5 mmol/L    Chloride 109 96 - 112 mmol/L    Co2 26 20 - 33 mmol/L    Anion Gap 9.0 0.0 - 11.9    Glucose 81 65 - 99 mg/dL    Bun 17 8 - 22 mg/dL    Creatinine 0.68 0.50 - 1.40 mg/dL    Calcium 9.7 8.5 - 10.5 mg/dL    AST(SGOT) 16 12 - 45 U/L    ALT(SGPT) 17 2 - 50 U/L    Alkaline Phosphatase 87 30 - 99 U/L    Total Bilirubin 0.4 0.1 - 1.5 mg/dL    Albumin 4.0 3.2 - 4.9 g/dL    Total Protein 6.2 6.0 - 8.2 g/dL    Globulin 2.2 1.9 - 3.5 g/dL    A-G Ratio 1.8 g/dL   WESTERGREN SED RATE   Result Value Ref Range    Sed Rate Westergren 6 0 - 30 mm/hour   CRP QUANTITIVE (NON-CARDIAC)   Result Value Ref Range    Stat C-Reactive Protein 0.44 0.00 - 0.75 mg/dL   ESTIMATED GFR   Result Value Ref Range    GFR If African American >60 >60 mL/min/1.73 m 2    GFR If Non African American >60 >60 mL/min/1.73 m 2         Impression/Plan:    1. Rheumatoid Arthritis, seropositive  2. High Risk medication use  CDAI- in remission on current regimen    Labs reviewed 1/2019:  CBC, CMP ESR and CRP unremarkable    Plan:  - Patient will stop MTX after receives next Rituxan infusion  - Will order next RItuxan infusion to be given as scheduled on 2/6/19- will maintain 1000mg IV on days 0 and 14 for now however may consider decreasing dose to 1000mg IV x1 at next visit if remains in remission off MTX (PMID [id number]) and prednisone  - Remain off prednsione  - Repeat CBC, CMP, ESR and CRP prior to next visit in 4 months.      3.  Osteopenia  On Prolia- last received 8/1/18- next due 2/1/18  Has not completed the DEXA ordered 4/2018  now off prednisone    Plan:    - Check Vitamin D with next labs  - Continue Prolia- will receive injection at infusion on 2/6- order faxed over  - Given new order for repeat DEXA and encouraged patient to have done,               Patient will be in contact with me for any questions or concerns, will otherwise follow up with me as scheduled in 4 months.        Kallie Obrien MD

## 2019-01-30 NOTE — LETTER
[facility]   [address] [place] [address], [address]  [place] [address]  Phone: [phone]  Fax: [phone]              Encounter Date: 1/30/2019    Dear Dr. Caicedo ref. provider found,    It was a pleasure seeing your patient, Gaye Antoine, on 1/30/2019. The primary encounter diagnosis was Rheumatoid arthritis, involving unspecified site, unspecified rheumatoid factor presence (HCC). Diagnoses of Methotrexate, long term, current use, Osteopenia, unspecified location, High risk medication use, Other specified disorders of bone density and structure, multiple sites , and Encounter for long-term (current) use of high-risk medication were also pertinent to this visit.     Please find attached progress note which includes the history I obtained from Ms. Antoine, my physical examination findings, my impression and recommendations.      Once again, it was a pleasure participating in your patient's care.  Please feel free to contact me if you have any questions or if I can be of any further assistance to your patients.      Sincerely,    Kallie Obrien M.D.  Electronically Signed          PROGRESS NOTE:  RHEUMATOLOGY CLINIC FOLLOW UP NOTE        Chief complaint:  Follow up        HPI:  77 y/o F with RA presents today for follow up, she is a new patient to me, previously followed with Dr. Islas, last seen 7/2018.    Remains on Rtixuan, MTX 2 tabs weekly (decreased from 5 tabs at the last visit) and is now off prednisone since about 8/2018.  She reports she has no pain or morning stiffness at this time.  Denies rashes, fevers, sotmach upset, or infusion reactions.  She is interested in getting off of the MTX.  Denies any infections.    She is on Rituxan,  last infusions 7/18 and 8/11/18.    Of note, patient does seem to be declining mentally over time however is able to reinforce her management plan and understands her treatment regimen.     Rheum Background:  She was diagnosed in 1984 with presenting  symptoms of burning feet.  At the time she denied rash.  EMG was not completed.  She states the burning of her feet were the only symptoms.  She tried GOLD and then prednisone.  Reports she has tried several medications and only rituxan works.  She reports being on methotrexate x 30 years. She cannot recall the names of the medications      Rituxan: 1/4/2017 and 1/18/2017 and 1/31/2018 and 1/17/2018 and 7/18/2018 and 8/1/2018    Of prednisone since about 8/2018     Osteopenia in spine and right femur  On prolia  DEXA 10/23/2012 showed osteopenia with FRAX scores that are elevated > 20% and > 3 % for 10 year risk of major fracture and hip fracture respectively  Taking calcium C and vitmain D ( takes 3 pills daily)        ROS:    GEN: denies fevers  CV:  no palpitations, no chest dyscomfor  Pulm: no dyspnea, no cough  GI: no diarrhea, nausea or vomiting  Skin: no rashes currently  MS: no back pain, no joint swelling            OUTPATIENT MEDS:    Prior to Admission medications    Medication Sig Start Date End Date Taking? Authorizing Provider   Multiple Vitamins-Minerals (OCUVITE-LUTEIN) Tab Take 1 tablet by mouth every day.   Yes Physician Outpatient   SYNTHROID 175 MCG Tab Take 1 Tab by mouth every morning. ON A EMPTY STOMACH 7/25/18  Yes Kvng Zabala M.D.   citalopram (CELEXA) 10 MG tablet Take 1 Tab by mouth every day. 6/26/18  Yes Kvng Zabala M.D.   methotrexate 2.5 MG Tab Take 6 tablets by mouth  every 7 days lab due before 8/21//2018 for refills  Patient taking differently: Take 6 tablets by mouth  every 7 days lab due before 8/21//2018 for refills 6/1/18  Yes Sophie Islas M.D.   folic acid (FOLVITE) 1 MG Tab Take 1 Tab by mouth every day. 3/28/17  Yes Sophie Islas M.D.   Calcium Citrate-Vitamin D (CALCIUM + D PO) Take  by mouth.   Yes Physician Outpatient   Omega-3 Fatty Acids (FISH OIL) 1000 MG Cap capsule Take 1,000 mg by mouth 3 times a day, with meals.   Yes Physician Outpatient   "  rosuvastatin (CRESTOR) 10 MG Tab TAKE 1 TABLET BY MOUTH  DAILY  Patient not taking: Reported on 1/30/2019 1/30/18   Kvng Zabala M.D.           Past Medical History:   Diagnosis Date   • Cholelithiasis 2010   • Chronic constipation    • Chronic depressive disorder 1/19/2013   • Chronic use of steroids 1/19/2013   • CVA (cerebral infarction) 2008    L hemiparesis / ? 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  She has a 25.00 pack-year smoking history. She has never used smokeless tobacco. She reports that she drinks alcohol. She reports that she does not use drugs.             Physical Exam:     /76 (BP Location: Left arm, Patient Position: Sitting, BP Cuff Size: Adult)   Pulse 93   Temp 36.8 °C (98.2 °F) (Temporal)   Ht 1.651 m (5' 5\")   Wt 78.5 kg (173 lb)   LMP 03/01/2005   SpO2 88%   BMI 28.79 kg/m²          GEN: well-appearing, NAD  Head: no focal alopecia, no hair thinning  ENT: no conjunctival icterus or injection, no LAD, no oral ulcers  Pulm: normal chest expansion, speaking in full sentences  MUSCUSKELETAL:  no edema, dactylitis, entesitis      SHOULDERs: no tenderness  ELBOWS:  no tenderness no synovitis  WRISTS:   no tenderness no synovitis  MCPS:   no tenderness no synovitis  PIPS:    no tenderness no synovitis  DIPS: no tenderness no synovitis  KNEES:  no tenderness no synovitis              ANKLES:  no tenderness no synovitis, chronic puffiness of left ankle           SKIN: no rashes, skin changes, nail " changes, no periungual erythema.               Labs:    Results for orders placed or performed during the hospital encounter of 01/29/19   CBC WITH DIFFERENTIAL   Result Value Ref Range    WBC 9.3 4.8 - 10.8 K/uL    RBC 5.27 4.20 - 5.40 M/uL    Hemoglobin 15.6 12.0 - 16.0 g/dL    Hematocrit 48.8 (H) 37.0 - 47.0 %    MCV 92.6 81.4 - 97.8 fL    MCH 29.6 27.0 - 33.0 pg    MCHC 32.0 (L) 33.6 - 35.0 g/dL    RDW 48.3 35.9 - 50.0 fL    Platelet Count 248 164 - 446 K/uL    MPV 10.7 9.0 - 12.9 fL    Neutrophils-Polys 72.10 (H) 44.00 - 72.00 %    Lymphocytes 17.50 (L) 22.00 - 41.00 %    Monocytes 9.30 0.00 - 13.40 %    Eosinophils 0.40 0.00 - 6.90 %    Basophils 0.30 0.00 - 1.80 %    Immature Granulocytes 0.40 0.00 - 0.90 %    Nucleated RBC 0.00 /100 WBC    Neutrophils (Absolute) 6.72 2.00 - 7.15 K/uL    Lymphs (Absolute) 1.63 1.00 - 4.80 K/uL    Monos (Absolute) 0.87 (H) 0.00 - 0.85 K/uL    Eos (Absolute) 0.04 0.00 - 0.51 K/uL    Baso (Absolute) 0.03 0.00 - 0.12 K/uL    Immature Granulocytes (abs) 0.04 0.00 - 0.11 K/uL    NRBC (Absolute) 0.00 K/uL   COMP METABOLIC PANEL   Result Value Ref Range    Sodium 144 135 - 145 mmol/L    Potassium 4.0 3.6 - 5.5 mmol/L    Chloride 109 96 - 112 mmol/L    Co2 26 20 - 33 mmol/L    Anion Gap 9.0 0.0 - 11.9    Glucose 81 65 - 99 mg/dL    Bun 17 8 - 22 mg/dL    Creatinine 0.68 0.50 - 1.40 mg/dL    Calcium 9.7 8.5 - 10.5 mg/dL    AST(SGOT) 16 12 - 45 U/L    ALT(SGPT) 17 2 - 50 U/L    Alkaline Phosphatase 87 30 - 99 U/L    Total Bilirubin 0.4 0.1 - 1.5 mg/dL    Albumin 4.0 3.2 - 4.9 g/dL    Total Protein 6.2 6.0 - 8.2 g/dL    Globulin 2.2 1.9 - 3.5 g/dL    A-G Ratio 1.8 g/dL   WESTERGREN SED RATE   Result Value Ref Range    Sed Rate Westergren 6 0 - 30 mm/hour   CRP QUANTITIVE (NON-CARDIAC)   Result Value Ref Range    Stat C-Reactive Protein 0.44 0.00 - 0.75 mg/dL   ESTIMATED GFR   Result Value Ref Range    GFR If African American >60 >60 mL/min/1.73 m 2    GFR If Non  >60 >60  mL/min/1.73 m 2         Impression/Plan:    1. Rheumatoid Arthritis, seropositive  2. High Risk medication use  CDAI- in remission on current regimen    Labs reviewed 1/2019:  CBC, CMP ESR and CRP unremarkable    Plan:  - Patient will stop MTX after receives next Rituxan infusion  - Will order next RItuxan infusion to be given as scheduled on 2/6/19- will maintain 1000mg IV on days 0 and 14 for now however may consider decreasing dose to 1000mg IV x1 at next visit if remains in remission off MTX (PMID [id number]) and prednisone  - Remain off prednsione  - Repeat CBC, CMP, ESR and CRP prior to next visit in 4 months.      3.  Osteopenia  On Prolia- last received 8/1/18- next due 2/1/18  Has not completed the DEXA ordered 4/2018  now off prednisone    Plan:    - Check Vitamin D with next labs  - Continue Prolia- will receive injection at infusion on 2/6- order faxed over  - Given new order for repeat DEXA and encouraged patient to have done,               Patient will be in contact with me for any questions or concerns, will otherwise follow up with me as scheduled in 4 months.        Kallie Obrien MD

## 2019-02-06 ENCOUNTER — OUTPATIENT INFUSION SERVICES (OUTPATIENT)
Dept: ONCOLOGY | Facility: MEDICAL CENTER | Age: 79
End: 2019-02-06
Attending: INTERNAL MEDICINE
Payer: MEDICARE

## 2019-02-06 VITALS
SYSTOLIC BLOOD PRESSURE: 129 MMHG | WEIGHT: 172.18 LBS | RESPIRATION RATE: 18 BRPM | OXYGEN SATURATION: 94 % | HEART RATE: 84 BPM | BODY MASS INDEX: 27.67 KG/M2 | HEIGHT: 66 IN | DIASTOLIC BLOOD PRESSURE: 79 MMHG | TEMPERATURE: 98 F

## 2019-02-06 PROCEDURE — 96375 TX/PRO/DX INJ NEW DRUG ADDON: CPT | Mod: XU

## 2019-02-06 PROCEDURE — A9270 NON-COVERED ITEM OR SERVICE: HCPCS | Performed by: INTERNAL MEDICINE

## 2019-02-06 PROCEDURE — 700111 HCHG RX REV CODE 636 W/ 250 OVERRIDE (IP): Mod: JG | Performed by: INTERNAL MEDICINE

## 2019-02-06 PROCEDURE — 96415 CHEMO IV INFUSION ADDL HR: CPT

## 2019-02-06 PROCEDURE — 700102 HCHG RX REV CODE 250 W/ 637 OVERRIDE(OP): Performed by: INTERNAL MEDICINE

## 2019-02-06 PROCEDURE — 96401 CHEMO ANTI-NEOPL SQ/IM: CPT

## 2019-02-06 PROCEDURE — 96413 CHEMO IV INFUSION 1 HR: CPT

## 2019-02-06 PROCEDURE — 700105 HCHG RX REV CODE 258: Performed by: INTERNAL MEDICINE

## 2019-02-06 RX ORDER — METHYLPREDNISOLONE SODIUM SUCCINATE 125 MG/2ML
100 INJECTION, POWDER, LYOPHILIZED, FOR SOLUTION INTRAMUSCULAR; INTRAVENOUS ONCE
Status: COMPLETED | OUTPATIENT
Start: 2019-02-06 | End: 2019-02-06

## 2019-02-06 RX ORDER — ACETAMINOPHEN 500 MG
500 TABLET ORAL ONCE
Status: COMPLETED | OUTPATIENT
Start: 2019-02-06 | End: 2019-02-06

## 2019-02-06 RX ORDER — DIPHENHYDRAMINE HCL 25 MG
25 TABLET ORAL ONCE
Status: COMPLETED | OUTPATIENT
Start: 2019-02-06 | End: 2019-02-06

## 2019-02-06 RX ADMIN — DIPHENHYDRAMINE HCL 25 MG: 25 TABLET ORAL at 08:55

## 2019-02-06 RX ADMIN — DENOSUMAB 60 MG: 60 INJECTION SUBCUTANEOUS at 08:56

## 2019-02-06 RX ADMIN — ACETAMINOPHEN 500 MG: 500 TABLET, FILM COATED ORAL at 08:55

## 2019-02-06 RX ADMIN — METHYLPREDNISOLONE SODIUM SUCCINATE 100 MG: 125 INJECTION, POWDER, FOR SOLUTION INTRAMUSCULAR; INTRAVENOUS at 08:55

## 2019-02-06 RX ADMIN — RITUXIMAB 1000 MG: 10 INJECTION, SOLUTION INTRAVENOUS at 09:09

## 2019-02-06 NOTE — PROGRESS NOTES
Pharmacy note    Labs 1/29/19:  Serum Ca = 9.7  Cr = 0.68, CrCl ~ 82 ml/min (min SCr 0.7 used)  OK for denosumab (Prolia) 60 mg SQ today    Last tx = 8/1/18      Katja Ruelas, PharmD

## 2019-02-06 NOTE — PROGRESS NOTES
Pt arrived to [facility], ambulatory, for day 1 Rituxan/prolia. Pt voices no complaints. Pt denies any recent or upcoming dental surgeries. Labs reviewed from 1/29, pt within parameters to treat today. 24g PIV established in the L-FA, positive blood return noted. Pre-medications administered with no s/sx of adverse reaction. Rituxan infused with no s/sx of adverse reaction, titrated per protocol. Prolia injected into the L-upper arm with no s/sx of adverse reaction. PIV flushed and removed. Pt left [facility] with no s/sx of distress. Follow up appointment confirmed.

## 2019-02-06 NOTE — PROGRESS NOTES
"Pharmacy Chemotherapy Verification    Patient Name: Gaye Antoine  Dx: RA        Protocol: RiTUXimab    *Dosing Reference*   RiTUXimab 1000 mg IV Days 1 and 15  [id number]  Jag HITCHCOCK, et al. N Engl J Med 2004; [id number]. Efficacy of B-Cell-Targeted Therapy with Rituximab in Patients with Rheumatoid Arthritis     Allergies: Sulfa drugs   /79   Pulse 84   Temp 36.7 °C (98 °F) (Temporal)   Resp 18   Ht 1.664 m (5' 5.5\")   Wt 78.1 kg (172 lb 2.9 oz)   LMP 03/01/2005   SpO2 94%   BMI 28.22 kg/m²     Body surface area is 1.9 meters squared.    Labs 1/29/19  ANC~6720     Plt = 248 k     Hgb = 15.6  SCr = 0.68 mg/dL  CrCl ~ 84 mL/min (min SCr 0.7 used)  AST/ALT/AP = 16/17/87 TBili = 0.4     12/2/2016 13:48   Hepatitis B Surface Antigen Negative   Hepatitis B Core Ab, Total Negative     Drug Order   (Drug name, dose, route, IV Fluid & volume, frequency, number of doses) Cycle 5,  Day 1  **PAPER ORDERS IN CHART**  Previous treatment: C4D15 = 8/1/18   Medication = RiTUXimab (Rituxan)  Base Dose = 1000 mg fixed dose  Calc Dose: no calculation required  Final Dose = 1000 mg  Route = IV  Fluid & Volume =  mL  Admin Duration = per rate sheet          <5% difference, OK to treat with final dose     By my signature below, I confirm this process was performed independently with the BSA and all final chemotherapy dosing calculations congruent. I have reviewed the above chemotherapy order and that my calculation of the final dose and BSA (when applicable) corroborate those calculations of the  pharmacist. Discrepancies of 5% or greater in the written dose have been addressed and documented within the [facility] Progress notes.    Renae Hayward, PharmD, BCOP    "

## 2019-02-06 NOTE — PROGRESS NOTES
Chemotherapy Verification - PRIMARY RN      Height = 166.4 cm  Weight = 78.1 kg  BSA = 1.9 m2       Medication: Rituxan  Dose: 1,000 mg (set dose)  Calculated Dose: 1,000 mg                             (In mg/m2, AUC, mg/kg)     I confirm this process was performed independently with the BSA and all final chemotherapy dosing calculations congruent.  Any discrepancies of 5% or greater have been addressed with the chemotherapy pharmacist. The resolution of the discrepancy has been documented in the EPIC progress notes.

## 2019-02-06 NOTE — PROGRESS NOTES
Chemotherapy Verification - PRIMARY RN      Height = 166.4 cm  Weight = 78.1 kg  BSA = 1.9 m2       Medication: Rituxan  Dose: 1,000 mg set dose  Calculated Dose: 1,000 mg set dose                             (In mg/m2, AUC, mg/kg)           I confirm this process was performed independently with the BSA and all final chemotherapy dosing calculations congruent.  Any discrepancies of 5% or greater have been addressed with the chemotherapy pharmacist. The resolution of the discrepancy has been documented in the EPIC progress notes.

## 2019-02-15 DIAGNOSIS — M05.741 RHEUMATOID ARTHRITIS INVOLVING BOTH HANDS WITH POSITIVE RHEUMATOID FACTOR (HCC): Primary | ICD-10-CM

## 2019-02-15 DIAGNOSIS — M05.742 RHEUMATOID ARTHRITIS INVOLVING BOTH HANDS WITH POSITIVE RHEUMATOID FACTOR (HCC): Primary | ICD-10-CM

## 2019-02-15 NOTE — TELEPHONE ENCOUNTER
Was the patient seen in the last year in this department? Yes  1/30/19. Labs 1/29/19.  Does patient have an active prescription for medications requested? Yes    Received Request Via: Pharmacy     Routed to Dr. Obrien for review    Per last note, pt will D/C this medication?

## 2019-03-20 ENCOUNTER — TELEPHONE (OUTPATIENT)
Dept: RHEUMATOLOGY | Facility: MEDICAL CENTER | Age: 79
End: 2019-03-20

## 2019-03-20 NOTE — TELEPHONE ENCOUNTER
Faxed message received from radiology scheduling on 3/19/19. They have made several attempts to contact patient to schedule bone density exam and have not been able to reach patient. I mailed a letter to the patient asking her to call them to schedule. NK

## 2019-03-26 RX ORDER — LEVOTHYROXINE SODIUM 175 MCG
TABLET ORAL
Qty: 90 TAB | Refills: 0 | Status: SHIPPED | OUTPATIENT
Start: 2019-03-26 | End: 2021-05-06 | Stop reason: SDUPTHER

## 2019-03-28 ENCOUNTER — HOSPITAL ENCOUNTER (EMERGENCY)
Facility: MEDICAL CENTER | Age: 79
End: 2019-04-01
Payer: MEDICARE

## 2019-06-10 NOTE — ASSESSMENT & PLAN NOTE
The patient has been on chronic steroids, this is managed by Dr. Islas, her rheumatologist. She is also on methotrexate 2.5 mg   10-Ronnell-2019 02:38

## 2019-07-17 ENCOUNTER — OFFICE VISIT (OUTPATIENT)
Dept: MEDICAL GROUP | Age: 79
End: 2019-07-17
Payer: MEDICARE

## 2019-07-17 VITALS
OXYGEN SATURATION: 90 % | BODY MASS INDEX: 26.62 KG/M2 | HEART RATE: 101 BPM | DIASTOLIC BLOOD PRESSURE: 90 MMHG | TEMPERATURE: 97.9 F | WEIGHT: 159.8 LBS | HEIGHT: 65 IN | SYSTOLIC BLOOD PRESSURE: 128 MMHG

## 2019-07-17 DIAGNOSIS — F32.A CHRONIC DEPRESSIVE DISORDER: ICD-10-CM

## 2019-07-17 DIAGNOSIS — Z23 NEED FOR VACCINATION: ICD-10-CM

## 2019-07-17 DIAGNOSIS — M81.0 POST-MENOPAUSAL OSTEOPOROSIS: ICD-10-CM

## 2019-07-17 DIAGNOSIS — E03.8 OTHER SPECIFIED HYPOTHYROIDISM: ICD-10-CM

## 2019-07-17 PROCEDURE — 90732 PPSV23 VACC 2 YRS+ SUBQ/IM: CPT | Performed by: FAMILY MEDICINE

## 2019-07-17 PROCEDURE — G0009 ADMIN PNEUMOCOCCAL VACCINE: HCPCS | Performed by: FAMILY MEDICINE

## 2019-07-17 PROCEDURE — 99214 OFFICE O/P EST MOD 30 MIN: CPT | Mod: 25 | Performed by: FAMILY MEDICINE

## 2019-07-17 RX ORDER — LEVOTHYROXINE SODIUM 175 MCG
175 TABLET ORAL EVERY MORNING
Qty: 90 TAB | Refills: 2 | Status: SHIPPED | OUTPATIENT
Start: 2019-07-17 | End: 2020-04-30

## 2019-07-17 RX ORDER — LEVOTHYROXINE SODIUM 175 MCG
175 TABLET ORAL EVERY MORNING
Qty: 90 TAB | Refills: 1 | Status: SHIPPED | OUTPATIENT
Start: 2019-07-17 | End: 2019-07-17 | Stop reason: SDUPTHER

## 2019-07-17 RX ORDER — CITALOPRAM HYDROBROMIDE 10 MG/1
10 TABLET ORAL
Qty: 90 TAB | Refills: 2 | Status: SHIPPED | OUTPATIENT
Start: 2019-07-17 | End: 2021-03-25

## 2019-07-17 ASSESSMENT — PATIENT HEALTH QUESTIONNAIRE - PHQ9
4. FEELING TIRED OR HAVING LITTLE ENERGY: NOT AT ALL
SUM OF ALL RESPONSES TO PHQ QUESTIONS 1-9: 2
6. FEELING BAD ABOUT YOURSELF - OR THAT YOU ARE A FAILURE OR HAVE LET YOURSELF OR YOUR FAMILY DOWN: SEVERAL DAYS
3. TROUBLE FALLING OR STAYING ASLEEP OR SLEEPING TOO MUCH: NOT AT ALL
5. POOR APPETITE OR OVEREATING: NOT AT ALL
9. THOUGHTS THAT YOU WOULD BE BETTER OFF DEAD, OR OF HURTING YOURSELF: NOT AT ALL
8. MOVING OR SPEAKING SO SLOWLY THAT OTHER PEOPLE COULD HAVE NOTICED. OR THE OPPOSITE, BEING SO FIGETY OR RESTLESS THAT YOU HAVE BEEN MOVING AROUND A LOT MORE THAN USUAL: NOT AT ALL
1. LITTLE INTEREST OR PLEASURE IN DOING THINGS: NOT AT ALL
SUM OF ALL RESPONSES TO PHQ9 QUESTIONS 1 AND 2: 1
2. FEELING DOWN, DEPRESSED, IRRITABLE, OR HOPELESS: SEVERAL DAYS
7. TROUBLE CONCENTRATING ON THINGS, SUCH AS READING THE NEWSPAPER OR WATCHING TELEVISION: NOT AT ALL

## 2019-07-18 NOTE — PROGRESS NOTES
This medical record contains text that has been entered with the assistance of computer voice recognition and dictation software.  Therefore, it may contain unintended errors in text, spelling, punctuation, or grammar        Chief Complaint   Patient presents with   • Medication Refill     thyroid medication          Elizabeth Myers is a 79 y.o. female here evaluation and management of: Medication refill      HPI:     1. Other specified hypothyroidism  Levothyroxine 175 mg p.o. Daily    The patient denies any new fatigue, cold/heat intolerance, weight gain/weight loss, diarrhea/constipation, dry skin, myalgia, depressed mood, palpitations, tremmor, hair loss, and no goiter.    Results for ELIZABETH MYERS (MRN [id number]) as of 2019 17:11   Ref. Range 2019 13:44   Stat C-Reactive Protein Latest Ref Range: 0.00 - 0.75 mg/dL 0.44       2. Need for vaccination  To for PPSV23    3. Post-menopausal osteoporosis  The patient had been on Prolia  She states for some reason it stopped and she has not had a repeat DEXA scan.      4. Chronic depressive disorder    Patient states ever since her   in  she was placed on citalopram.  Overall her mood is fine and patient has been stable with current management  We will make no changes for now.  She denies any suicidal homicidal ideations.  She currently lives with her cat 6-year-old female named Farhana.    DEPRESSION SCREEN  Denied all of the following,  Depressed mood  Loss of interest or pleasure in nearly all activities  Changes in appetite or weight  Insomnia or hypersomnia,  Psychomotor agitation or retardation,  Fatigue or loss of energy,  Feelings of worthlessness or guilt,  Difficulty thinking, concentrating, or making decisions,  Recurrent thoughts of death or suicidal ideation, plans, or attempts   No Early Morning Awakenings    Current medicines (including changes today)  Current Outpatient Prescriptions   Medication Sig Dispense Refill   •  SYNTHROID 175 MCG Tab Take 1 Tab by mouth every morning. ON A EMPTY STOMACH 90 Tab 2   • citalopram (CELEXA) 10 MG tablet Take 1 Tab by mouth every day. 90 Tab 2   • Multiple Vitamins-Minerals (OCUVITE-LUTEIN) Tab Take 1 tablet by mouth every day.     • folic acid (FOLVITE) 1 MG Tab Take 1 Tab by mouth every day. 30 Tab 11   • Calcium Citrate-Vitamin D (CALCIUM + D PO) Take  by mouth.     • Omega-3 Fatty Acids (FISH OIL) 1000 MG Cap capsule Take 1,000 mg by mouth 3 times a day, with meals.     • SYNTHROID 175 MCG Tab TAKE 1 TABLET BY MOUTH  EVERY MORNING ON AN EMPTY  STOMACH (Patient not taking: Reported on 7/17/2019) 90 Tab 0   • methotrexate 2.5 MG Tab Take 6 tablets by mouth  every 7 days lab due before 8/21//2018 for refills (Patient not taking: Reported on 7/17/2019) 72 Tab 0   • rosuvastatin (CRESTOR) 10 MG Tab TAKE 1 TABLET BY MOUTH  DAILY (Patient not taking: Reported on 2/6/2019) 90 Tab 0     No current facility-administered medications for this visit.      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  She  has a past surgical history that includes ankle fusion (1/8/2009); hip cannulated screw (6/15/2009); other (2008); appendectomy (age 7); cholecystectomy; parathyroid exploration (2/22/2012); rhytidectomy; and mammoplasty reduction (2006).  Social History   Substance Use Topics   • Smoking status: Current Some Day Smoker     Packs/day: 0.50     Years: 50.00     Types: Cigarettes   • Smokeless tobacco: Never Used      Comment: Previous heavy 1 ppd  "smoker.    • Alcohol use 0.0 oz/week      Comment: rare     Social History     Social History Narrative    Retired  for [employer].     2005    49 yo son.    No grandchildren.    Sister with PCKD may have been 1/2 sister (family secret)     Family History   Problem Relation Age of Onset   • Cancer Mother         breast   • Lung Disease Mother         TB   • Heart Disease Father         aneurysm   • Genitourinary () Sister         polycystic kidneys     Family Status   Relation Status   • Mo         CHF, chronic lung dz   • Fa         abd aneurysm   • Sis         polycystic kidney dz (1/2 sister)   • Sis Alive   • Sis (Not Specified)         ROS    The pertinent  ROS findings can be seen in the HPI above.     All other systems reviewed and are negative     Objective:     /90 (BP Location: Left arm, Patient Position: Sitting, BP Cuff Size: Adult)   Pulse (!) 101   Temp 36.6 °C (97.9 °F) (Temporal)   Ht 1.651 m (5' 5\")   Wt 72.5 kg (159 lb 12.8 oz)   SpO2 90%  Body mass index is 26.59 kg/m².      Physical Exam:    Constitutional: Alert, no distress.  Skin: no rashes  Eye: Equal, round and reactive, conjunctiva clear, lids normal.  ENMT: Lips without lesions, good dentition, oropharynx clear.  Neck: Trachea midline, no masses, no thyromegaly. No cervical or supraclavicular lymphadenopathy.  Respiratory: Unlabored respiratory effort, lungs clear to auscultation, no wheezes, no ronchi.  Cardiovascular: Normal S1, S2, no murmur, no edema  Abdomen: Soft, non-tender, no masses, no hepatosplenomegaly.  Psych: Alert and oriented x3, normal affect and mood.          Assessment and Plan:   The following treatment plan was discussed      1. Other specified hypothyroidism  Patient has been stable with current management  We will make no changes for now      2. Need for vaccination  Vaccine was administered today without adverse event.    - Pneumococal " Polysaccharide Vaccine 23-Valent =>3YO SQ/IM    3. Post-menopausal osteoporosis    Repeat DEXA    - DS-BONE DENSITY STUDY (DEXA); Future    4. Chronic depressive disorder  Patient has been stable with current management  We will make no changes for now    - citalopram (CELEXA) 10 MG tablet; Take 1 Tab by mouth every day.  Dispense: 90 Tab; Refill: 2            Instructed to Follow up in clinic or ER for worsening symptoms, difficulty breathing, lack of expected recovery, or should new symptoms or problems arise.    Followup: Return in about 6 months (around 1/17/2020) for Reevaluation, labs.       Once again this medical record contains text that has been entered with the assistance of computer voice recognition and dictation software.  Therefore, it may contain unintended errors in text, spelling, punctuation, or grammar

## 2019-08-06 ENCOUNTER — TELEPHONE (OUTPATIENT)
Dept: MEDICAL GROUP | Age: 79
End: 2019-08-06

## 2019-08-06 NOTE — TELEPHONE ENCOUNTER
VOICEMAIL  1. Caller Name: Gaye Antoine                      Call Back Number: [phone] (home)       2. Message: Pt left VM and would like an update on how Andrade's surgery went    3. Patient approves office to leave a detailed voicemail/MyChart message: yes

## 2019-10-21 ENCOUNTER — HOSPITAL ENCOUNTER (OUTPATIENT)
Dept: RADIOLOGY | Facility: MEDICAL CENTER | Age: 79
End: 2019-10-21
Attending: FAMILY MEDICINE
Payer: MEDICARE

## 2019-10-21 DIAGNOSIS — M81.0 POST-MENOPAUSAL OSTEOPOROSIS: ICD-10-CM

## 2019-10-21 PROCEDURE — 77080 DXA BONE DENSITY AXIAL: CPT

## 2019-11-04 ENCOUNTER — TELEPHONE (OUTPATIENT)
Dept: MEDICAL GROUP | Age: 79
End: 2019-11-04

## 2019-11-04 NOTE — TELEPHONE ENCOUNTER
Phone Number Called: [phone] (home)       Call outcome: left message for patient to call back regarding message below    Message: LVM for patient to call us back to discuss bone density test results.     ----- Message from Kvng Browning M.D. sent at 11/1/2019  1:56 PM PDT -----  Please call patient and state----Your bone density scan confirms normal bone density at your low spine and decreased at your hip. You have osteopenia - which means your bone density less than normal but not to the degree of osteoporosis. I recommend that we repeat your DEXA test in 2 years. The following strategies will help you maintain your bone density: exercise regularly, keep a healthy body weight, and consume 1000 IU vitamin D3 daily (or as directed if you have Vitamin D deficiency.)      Kvng Browning MD  [facility]  [address] [place] [id number]

## 2019-11-07 NOTE — TELEPHONE ENCOUNTER
Phone Number Called: [phone] (home)     Call outcome: spoke to patient regarding message below    Message: spoke with pt and gave her her bone density test results. Pt understands results and will call if she has any questions.

## 2020-04-30 RX ORDER — LEVOTHYROXINE SODIUM 175 MCG
175 TABLET ORAL EVERY MORNING
Qty: 90 TAB | Refills: 0 | Status: SHIPPED | OUTPATIENT
Start: 2020-04-30 | End: 2020-07-29

## 2020-06-17 ENCOUNTER — PATIENT OUTREACH (OUTPATIENT)
Dept: SCHEDULING | Facility: IMAGING CENTER | Age: 80
End: 2020-06-17

## 2020-07-23 ENCOUNTER — OFFICE VISIT (OUTPATIENT)
Dept: MEDICAL GROUP | Age: 80
End: 2020-07-23
Payer: MEDICARE

## 2020-07-23 VITALS
OXYGEN SATURATION: 94 % | BODY MASS INDEX: 28.66 KG/M2 | DIASTOLIC BLOOD PRESSURE: 90 MMHG | SYSTOLIC BLOOD PRESSURE: 130 MMHG | WEIGHT: 172 LBS | TEMPERATURE: 98.8 F | HEART RATE: 65 BPM | HEIGHT: 65 IN

## 2020-07-23 DIAGNOSIS — F43.21 GRIEF: ICD-10-CM

## 2020-07-23 PROCEDURE — 99214 OFFICE O/P EST MOD 30 MIN: CPT | Performed by: FAMILY MEDICINE

## 2020-07-23 RX ORDER — CITALOPRAM 20 MG/1
TABLET ORAL
Qty: 60 TAB | Refills: 0 | Status: SHIPPED | OUTPATIENT
Start: 2020-07-23 | End: 2020-09-15

## 2020-07-23 ASSESSMENT — FIBROSIS 4 INDEX: FIB4 SCORE: 1.25

## 2020-07-23 NOTE — PROGRESS NOTES
This medical record contains text that has been entered with the assistance of computer voice recognition and dictation software.  Therefore, it may contain unintended errors in text, spelling, punctuation, or grammar      Chief Complaint   Patient presents with   • Other     patient son  and patient is need help with renae Combsmeliton Antoine is a 80 y.o. female here evaluation and management of: Grieving loss of her son      HPI:     1. Grief  NEW PROBLEM    The patient is an 80-year-old female who presents to clinic with a chief complaint of feeling down since losing her son in 2020.  She states that the son was on top of the chair trying to change a light bulb he fell down hit his head later on during the autopsy it was revealed that he actually had a heart attack which likely led to his fall.  The patient states that she continues to look in the window expecting him to return home.  She has tried to get together with a Protestant support group however they stopped due to the pandemic.  She denies any suicidal homicidal ideations.  She states that her 6-year-old cat Farhana also  the day after her son  so she felt all along.  Her goddaughter and the goddaughter's family which includes the  and 3 children are living in her downstairs basement.  She states that she will get a new dog or cat soon.  This is affecting her sleep at times.  She denies any visual or auditory hallucinations.    Current medicines (including changes today)  Current Outpatient Medications   Medication Sig Dispense Refill   • citalopram (CELEXA) 20 MG Tab TAKE 1/2 TAB PO Q24H FOR 7 DAYS THEN CONTINUE WITH ONE TAB PO Q24H 60 Tab 0   • SYNTHROID 175 MCG Tab TAKE 1 TABLET BY MOUTH  EVERY MORNING ON AN EMPTY  STOMACH 90 Tab 0   • folic acid (FOLVITE) 1 MG Tab Take 1 Tab by mouth every day. 30 Tab 11   • Calcium Citrate-Vitamin D (CALCIUM + D PO) Take  by mouth.     • Omega-3 Fatty Acids (FISH OIL) 1000 MG Cap capsule  Take 1,000 mg by mouth 3 times a day, with meals.     • SYNTHROID 175 MCG Tab TAKE 1 TAB BY MOUTH EVERY MORNING. ON A EMPTY STOMACH (Patient not taking: Reported on 7/23/2020) 90 Tab 0   • citalopram (CELEXA) 10 MG tablet Take 1 Tab by mouth every day. (Patient not taking: Reported on 7/23/2020) 90 Tab 2   • Multiple Vitamins-Minerals (OCUVITE-LUTEIN) Tab Take 1 tablet by mouth every day.     • methotrexate 2.5 MG Tab Take 6 tablets by mouth  every 7 days lab due before 8/21//2018 for refills (Patient not taking: Reported on 7/17/2019) 72 Tab 0   • rosuvastatin (CRESTOR) 10 MG Tab TAKE 1 TABLET BY MOUTH  DAILY (Patient not taking: Reported on 2/6/2019) 90 Tab 0     No current facility-administered medications for this visit.      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  She  has a past surgical history that includes ankle fusion (1/8/2009); hip cannulated screw (6/15/2009); other (2008); appendectomy (age 7); cholecystectomy; parathyroid exploration (2/22/2012); rhytidectomy; and mammoplasty reduction (2006).  Social History     Tobacco Use   • Smoking status: Current Some Day Smoker     Packs/day: 0.50     Years: 50.00     Pack years: 25.00     Types: Cigarettes   • Smokeless tobacco: Never Used   • Tobacco comment: Previous heavy 1 ppd smoker.    Substance Use Topics   • Alcohol use: Yes     Alcohol/week: 0.0 oz     Comment: rare   • Drug use: No     Social History     Social History Narrative    Retired  for [employer].      "    49 yo son.    No grandchildren.    Sister with PCKD may have been 1/2 sister (family secret)     Family History   Problem Relation Age of Onset   • Cancer Mother         breast   • Lung Disease Mother         TB   • Heart Disease Father         aneurysm   • Genitourinary () Problems Sister         polycystic kidneys     Family Status   Relation Name Status   • Mo          CHF, chronic lung dz   • Fa          abd aneurysm   • Sis          polycystic kidney dz (1/2 sister)   • Sis  Alive   • Sis  (Not Specified)         ROS    The pertinent  ROS findings can be seen in the HPI above.     All other systems reviewed and are negative     Objective:     /90 (BP Location: Left arm, Patient Position: Sitting, BP Cuff Size: Adult)   Pulse 65   Temp 37.1 °C (98.8 °F) (Temporal)   Ht 1.651 m (5' 5\")   Wt 78 kg (172 lb)   SpO2 94%  Body mass index is 28.62 kg/m².      Physical Exam:    Constitutional: Alert, no distress.  Skin: no rashes  Eye: Equal, round and reactive, conjunctiva clear, lids normal.  ENMT: Lips without lesions, good dentition, oropharynx clear.  Neck: Trachea midline, no masses, no thyromegaly. No cervical or supraclavicular lymphadenopathy.  Respiratory: Unlabored respiratory effort, lungs clear to auscultation, no wheezes, no ronchi.  Cardiovascular: Normal S1, S2, no murmur, no edema  Abdomen: Soft, non-tender, no masses, no hepatosplenomegaly.        Assessment and Plan:   The following treatment plan was discussed      1. Grief    We discussed that normal grief can last up to 12 months.  She is tried Celexa in the past with good results so I think we should try that.  We should also discussed the importance of talking to a therapist on a regular basis she was agreeable  I will see her again in 4 to 6 weeks    - citalopram (CELEXA) 20 MG Tab; TAKE 1/2 TAB PO Q24H FOR 7 DAYS THEN CONTINUE WITH ONE TAB PO Q24H  Dispense: 60 Tab; Refill: 0  - REFERRAL TO " PSYCHOLOGY            Instructed to Follow up in clinic or ER for worsening symptoms, difficulty breathing, lack of expected recovery, or should new symptoms or problems arise.    Followup: Return in about 6 weeks (around 9/3/2020) for Reevaluation, Discussing tollerance and efficacy of medication.       Once again this medical record contains text that has been entered with the assistance of computer voice recognition and dictation software.  Therefore, it may contain unintended errors in text, spelling, punctuation, or grammar

## 2020-07-29 RX ORDER — LEVOTHYROXINE SODIUM 175 MCG
175 TABLET ORAL EVERY MORNING
Qty: 90 TAB | Refills: 0 | Status: SHIPPED | OUTPATIENT
Start: 2020-07-29 | End: 2020-10-21

## 2020-09-09 ENCOUNTER — OFFICE VISIT (OUTPATIENT)
Dept: MEDICAL GROUP | Age: 80
End: 2020-09-09
Payer: MEDICARE

## 2020-09-09 VITALS
HEIGHT: 65 IN | TEMPERATURE: 97.7 F | OXYGEN SATURATION: 90 % | SYSTOLIC BLOOD PRESSURE: 122 MMHG | WEIGHT: 169.75 LBS | HEART RATE: 82 BPM | BODY MASS INDEX: 28.28 KG/M2 | DIASTOLIC BLOOD PRESSURE: 72 MMHG

## 2020-09-09 DIAGNOSIS — Z12.31 ENCOUNTER FOR SCREENING MAMMOGRAM FOR BREAST CANCER: ICD-10-CM

## 2020-09-09 DIAGNOSIS — Z23 NEED FOR VACCINATION: ICD-10-CM

## 2020-09-09 DIAGNOSIS — F43.21 GRIEF: ICD-10-CM

## 2020-09-09 PROCEDURE — 90471 IMMUNIZATION ADMIN: CPT | Performed by: FAMILY MEDICINE

## 2020-09-09 PROCEDURE — 90750 HZV VACC RECOMBINANT IM: CPT | Performed by: FAMILY MEDICINE

## 2020-09-09 PROCEDURE — 99213 OFFICE O/P EST LOW 20 MIN: CPT | Mod: 25 | Performed by: FAMILY MEDICINE

## 2020-09-09 ASSESSMENT — FIBROSIS 4 INDEX: FIB4 SCORE: 1.25

## 2020-09-09 NOTE — PROGRESS NOTES
This medical record contains text that has been entered with the assistance of computer voice recognition and dictation software.  Therefore, it may contain unintended errors in text, spelling, punctuation, or grammar      Chief Complaint   Patient presents with   • Follow-Up     6 month FV          Gaye Antoine is a 80 y.o. female here evaluation and management of: Follow-up for grief/depression      HPI:     1. Grief  The patient lost her son in March 2022 heart attack.  The very next day she lost her cat.  Her cat was 6 years old and named Farhana.  She has been feeling down depressed since then.  We started her on Zoloft on July 23, 2020.  The patient states that she is noticed significant improvement in her mood, denies any suicidal homicidal ideations.  Her goddaughter and their family are living downstairs from her.  Patient denies any unusual thoughts no visual auditory hallucinations, no racing thoughts.    2. Encounter for screening mammogram for breast cancer  Patient would like to be rescreened for breast cancer.    3. Need for vaccination  Patient is due for Shingrix vaccination, flu and Tdap.    Current medicines (including changes today)  Current Outpatient Medications   Medication Sig Dispense Refill   • SYNTHROID 175 MCG Tab TAKE 1 TAB BY MOUTH EVERY MORNING. ON A EMPTY STOMACH 90 Tab 0   • citalopram (CELEXA) 20 MG Tab TAKE 1/2 TAB PO Q24H FOR 7 DAYS THEN CONTINUE WITH ONE TAB PO Q24H 60 Tab 0   • SYNTHROID 175 MCG Tab TAKE 1 TABLET BY MOUTH  EVERY MORNING ON AN EMPTY  STOMACH 90 Tab 0   • folic acid (FOLVITE) 1 MG Tab Take 1 Tab by mouth every day. 30 Tab 11   • Omega-3 Fatty Acids (FISH OIL) 1000 MG Cap capsule Take 1,000 mg by mouth 3 times a day, with meals.     • citalopram (CELEXA) 10 MG tablet Take 1 Tab by mouth every day. (Patient not taking: Reported on 7/23/2020) 90 Tab 2   • Multiple Vitamins-Minerals (OCUVITE-LUTEIN) Tab Take 1 tablet by mouth every day.     • methotrexate 2.5 MG Tab  Take 6 tablets by mouth  every 7 days lab due before 8/21//2018 for refills (Patient not taking: Reported on 7/17/2019) 72 Tab 0   • rosuvastatin (CRESTOR) 10 MG Tab TAKE 1 TABLET BY MOUTH  DAILY (Patient not taking: Reported on 2/6/2019) 90 Tab 0   • Calcium Citrate-Vitamin D (CALCIUM + D PO) Take  by mouth.       No current facility-administered medications for this visit.      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  She  has a past surgical history that includes ankle fusion (1/8/2009); hip cannulated screw (6/15/2009); other (2008); appendectomy (age 7); cholecystectomy; parathyroid exploration (2/22/2012); rhytidectomy; and mammoplasty reduction (2006).  Social History     Tobacco Use   • Smoking status: Current Some Day Smoker     Packs/day: 0.50     Years: 50.00     Pack years: 25.00     Types: Cigarettes   • Smokeless tobacco: Never Used   • Tobacco comment: Previous heavy 1 ppd smoker.    Substance Use Topics   • Alcohol use: Yes     Alcohol/week: 0.0 oz     Comment: rare   • Drug use: No     Social History     Social History Narrative    Retired  for [employer].     2005    49 yo son.    No grandchildren.    Sister with PCKD may have been 1/2 sister (family secret)     Family History   Problem Relation Age of Onset   • Cancer Mother         breast   • Lung Disease Mother         TB   • Heart Disease Father         aneurysm   • Genitourinary () Problems Sister         polycystic kidneys     Family Status   Relation  "Name Status   • Mo          CHF, chronic lung dz   • Fa          abd aneurysm   • Sis          polycystic kidney dz (1/2 sister)   • Sis  Alive   • Sis  (Not Specified)         ROS    The pertinent  ROS findings can be seen in the HPI above.     All other systems reviewed and are negative     Objective:     /72 (BP Location: Left arm, Patient Position: Sitting, BP Cuff Size: Adult)   Pulse 82   Temp 36.5 °C (97.7 °F) (Temporal)   Ht 1.651 m (5' 5\")   Wt 77 kg (169 lb 12.1 oz)   SpO2 90%  Body mass index is 28.25 kg/m².      Physical Exam:    Constitutional: Alert, no distress.  Skin: no rashes  Eye: Equal, round and reactive, conjunctiva clear, lids normal.  ENMT: Lips without lesions, good dentition, oropharynx clear.  Neck: Trachea midline, no masses, no thyromegaly. No cervical or supraclavicular lymphadenopathy.  Respiratory: Unlabored respiratory effort, lungs clear to auscultation, no wheezes, no ronchi.  Cardiovascular: Normal S1, S2, no murmur, no edema  Abdomen: Soft, non-tender, no masses, no hepatosplenomegaly.        Assessment and Plan:   The following treatment plan was discussed      1. Grief    Continue Zoloft  I also discussed that she will benefit from talking to mental health  She agrees    - REFERRAL TO PSYCHOLOGY    2. Encounter for screening mammogram for breast cancer    - MA-SCREENING MAMMO BILAT W/CAD; Future    3. Need for vaccination  Vaccine was administered today without adverse event.    - Shingrix Vaccine            Instructed to Follow up in clinic or ER for worsening symptoms, difficulty breathing, lack of expected recovery, or should new symptoms or problems arise.    Followup: No follow-ups on file.       Once again this medical record contains text that has been entered with the assistance of computer voice recognition and dictation software.  Therefore, it may contain unintended errors in text, spelling, punctuation, or grammar         "

## 2020-10-21 RX ORDER — LEVOTHYROXINE SODIUM 175 MCG
175 TABLET ORAL EVERY MORNING
Qty: 90 TAB | Refills: 0 | Status: SHIPPED | OUTPATIENT
Start: 2020-10-21 | End: 2021-01-14

## 2021-01-11 DIAGNOSIS — Z23 NEED FOR VACCINATION: ICD-10-CM

## 2021-03-18 DIAGNOSIS — F43.21 GRIEF: ICD-10-CM

## 2021-03-18 RX ORDER — CITALOPRAM 20 MG/1
TABLET ORAL
Qty: 90 TABLET | Refills: 0 | Status: SHIPPED | OUTPATIENT
Start: 2021-03-18 | End: 2021-03-25 | Stop reason: SDUPTHER

## 2021-03-23 ENCOUNTER — APPOINTMENT (OUTPATIENT)
Dept: MEDICAL GROUP | Age: 81
End: 2021-03-23
Payer: MEDICARE

## 2021-03-25 ENCOUNTER — OFFICE VISIT (OUTPATIENT)
Dept: MEDICAL GROUP | Age: 81
End: 2021-03-25
Payer: MEDICARE

## 2021-03-25 DIAGNOSIS — F43.21 GRIEF: ICD-10-CM

## 2021-03-25 DIAGNOSIS — F32.0 CURRENT MILD EPISODE OF MAJOR DEPRESSIVE DISORDER, UNSPECIFIED WHETHER RECURRENT (HCC): ICD-10-CM

## 2021-03-25 PROCEDURE — 99214 OFFICE O/P EST MOD 30 MIN: CPT | Performed by: FAMILY MEDICINE

## 2021-03-25 RX ORDER — CITALOPRAM 20 MG/1
TABLET ORAL
Qty: 90 TABLET | Refills: 3 | Status: SHIPPED | OUTPATIENT
Start: 2021-03-25 | End: 2021-12-06

## 2021-03-25 ASSESSMENT — PATIENT HEALTH QUESTIONNAIRE - PHQ9
5. POOR APPETITE OR OVEREATING: NOT AT ALL
2. FEELING DOWN, DEPRESSED, IRRITABLE, OR HOPELESS: NEARLY EVERY DAY
SUM OF ALL RESPONSES TO PHQ QUESTIONS 1-9: 11
8. MOVING OR SPEAKING SO SLOWLY THAT OTHER PEOPLE COULD HAVE NOTICED. OR THE OPPOSITE, BEING SO FIGETY OR RESTLESS THAT YOU HAVE BEEN MOVING AROUND A LOT MORE THAN USUAL: NOT AT ALL
4. FEELING TIRED OR HAVING LITTLE ENERGY: SEVERAL DAYS
9. THOUGHTS THAT YOU WOULD BE BETTER OFF DEAD, OR OF HURTING YOURSELF: SEVERAL DAYS
SUM OF ALL RESPONSES TO PHQ9 QUESTIONS 1 AND 2: 6
3. TROUBLE FALLING OR STAYING ASLEEP OR SLEEPING TOO MUCH: NOT AT ALL
6. FEELING BAD ABOUT YOURSELF - OR THAT YOU ARE A FAILURE OR HAVE LET YOURSELF OR YOUR FAMILY DOWN: MORE THAN HALF THE DAYS
7. TROUBLE CONCENTRATING ON THINGS, SUCH AS READING THE NEWSPAPER OR WATCHING TELEVISION: SEVERAL DAYS
1. LITTLE INTEREST OR PLEASURE IN DOING THINGS: NEARLY EVERY DAY

## 2021-03-25 NOTE — PROGRESS NOTES
This medical record contains text that has been entered with the assistance of computer voice recognition and dictation software.  Therefore, it may contain unintended errors in text, spelling, punctuation, or grammar      Chief Complaint   Patient presents with   • Depression         Gaye Antoine is a 80 y.o. female here evaluation and management of: follow up on depression      HPI:     1. Current mild episode of major depressive disorder, unspecified whether recurrent (HCC)  2. Grief    The patient is a very pleasant 80-year-old female with a significant past medical history of depression.  The patient is also suffering from grief which is prolonged..  Her son  after falling on a chair after he had a heart attack at the age of 59 in 2020, cat Farhana  the next day.  She did get a new dog named Christel who is 8 months old.  She does have a goddaughter and family that is living downstairs from her.  She does have good support.  Denies any suicidal or homicidal ideations.      Current medicines (including changes today)  Current Outpatient Medications   Medication Sig Dispense Refill   • citalopram (CELEXA) 20 MG Tab Take 1 tab po q24h 90 tablet 3   • SYNTHROID 175 MCG Tab TAKE 1 TABLET BY MOUTH  EVERY MORNING ON AN EMPTY  STOMACH 90 Tab 0   • folic acid (FOLVITE) 1 MG Tab Take 1 Tab by mouth every day. 30 Tab 11   • Calcium Citrate-Vitamin D (CALCIUM + D PO) Take  by mouth.     • Omega-3 Fatty Acids (FISH OIL) 1000 MG Cap capsule Take 1,000 mg by mouth 3 times a day, with meals.       No current facility-administered medications for this visit.     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  She  has a past surgical history that includes ankle fusion (2009); hip cannulated screw (6/15/2009); other (); appendectomy (age 7); cholecystectomy; parathyroid exploration (2012); rhytidectomy; and mammoplasty reduction ().  Social History     Tobacco Use   • Smoking status: Current Some Day Smoker     Packs/day: 0.50     Years: 50.00     Pack years: 25.00     Types: Cigarettes   • Smokeless tobacco: Never Used   • Tobacco comment: Previous heavy 1 ppd smoker.    Substance Use Topics   • Alcohol use: Yes     Alcohol/week: 0.0 oz     Comment: rare   • Drug use: No     Social History     Social History Narrative    Retired  for [employer].         51 yo son.    No grandchildren.    Sister with PCKD may have been 1/2 sister (family secret)     Family History   Problem Relation Age of Onset   • Cancer Mother         breast   • Lung Disease Mother         TB   • Heart Disease Father         aneurysm   • Genitourinary () Problems Sister         polycystic kidneys     Family Status   Relation Name Status   • Mo          CHF, chronic lung dz   • Fa          abd aneurysm   • Sis          polycystic kidney dz (1/2 sister)   • Sis  Alive   • Sis  (Not Specified)         ROS    The pertinent  ROS findings can be seen in the HPI above.     All other systems reviewed and are negative     Objective:     /95 (BP Location: Right arm, Patient Position: Sitting, BP Cuff Size: Adult)   Pulse 71   Temp 36.9 °C (98.4 °F) (Temporal)   Resp 14   Ht 1.651 m (5' 5\")   Wt 80.4 kg (177 lb 3.2 oz)   SpO2 97%  Body mass index is 29.49 kg/m².      Physical Exam:    Repeat bp 135/95    Constitutional: Alert, no distress.  Skin: No suspicious lesions  Eye: Equal, round and reactive, conjunctiva clear, lids " normal.  ENMT: Lips without lesions, good dentition, oropharynx clear.  Neck: Trachea midline, no masses, no thyromegaly. No cervical or supraclavicular lymphadenopathy.  Respiratory: Unlabored respiratory effort, lungs clear to auscultation, no wheezes, no ronchi.  Cardiovascular: Normal S1, S2, no murmur, no edema  Abdomen: Soft, non-tender, no masses, no hepatosplenomegaly.        Assessment and Plan:   The following treatment plan was discussed      1. Current mild episode of major depressive disorder, unspecified whether recurrent (HCC)  2. Grief      We will continue Celexa 20 mg p.o. daily  Return to clinic in 3 months  She adamantly denies any suicidal ideations.  - citalopram (CELEXA) 20 MG Tab; Take 1 tab po q24h  Dispense: 90 tablet; Refill: 3      Instructed to Follow up in clinic or ER for worsening symptoms, difficulty breathing, lack of expected recovery, or should new symptoms or problems arise.    Followup: Return in about 3 months (around 6/25/2021) for Reevaluation.    Addendum made to add repeat bp

## 2021-03-26 VITALS
DIASTOLIC BLOOD PRESSURE: 95 MMHG | OXYGEN SATURATION: 97 % | HEIGHT: 65 IN | SYSTOLIC BLOOD PRESSURE: 135 MMHG | BODY MASS INDEX: 29.52 KG/M2 | HEART RATE: 71 BPM | WEIGHT: 177.2 LBS | RESPIRATION RATE: 14 BRPM | TEMPERATURE: 98.4 F

## 2021-05-07 RX ORDER — LEVOTHYROXINE SODIUM 175 MCG
175 TABLET ORAL EVERY MORNING
Qty: 90 TABLET | Refills: 0 | Status: SHIPPED | OUTPATIENT
Start: 2021-05-07 | End: 2021-07-30

## 2021-07-01 ENCOUNTER — OFFICE VISIT (OUTPATIENT)
Dept: MEDICAL GROUP | Age: 81
End: 2021-07-01
Payer: MEDICARE

## 2021-07-01 VITALS
OXYGEN SATURATION: 92 % | BODY MASS INDEX: 29.52 KG/M2 | WEIGHT: 177.2 LBS | DIASTOLIC BLOOD PRESSURE: 90 MMHG | HEIGHT: 65 IN | TEMPERATURE: 98.8 F | HEART RATE: 72 BPM | RESPIRATION RATE: 16 BRPM | SYSTOLIC BLOOD PRESSURE: 122 MMHG

## 2021-07-01 DIAGNOSIS — Z02.9 ADMINISTRATIVE ENCOUNTER: ICD-10-CM

## 2021-07-01 PROCEDURE — 7105 PR DMV PHYSICAL: Performed by: FAMILY MEDICINE

## 2021-07-01 RX ORDER — BRIMONIDINE TARTRATE 1 MG/ML
SOLUTION/ DROPS OPHTHALMIC
COMMUNITY
Start: 2021-05-05 | End: 2021-12-06
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
## 2021-07-30 RX ORDER — LEVOTHYROXINE SODIUM 175 MCG
175 TABLET ORAL EVERY MORNING
Qty: 90 TABLET | Refills: 0 | Status: SHIPPED | OUTPATIENT
Start: 2021-07-30 | End: 2021-10-18

## 2021-10-18 RX ORDER — LEVOTHYROXINE SODIUM 175 MCG
175 TABLET ORAL EVERY MORNING
Qty: 90 TABLET | Refills: 0 | Status: SHIPPED | OUTPATIENT
Start: 2021-10-18 | End: 2022-01-10

## 2021-12-06 ENCOUNTER — OFFICE VISIT (OUTPATIENT)
Dept: INTERNAL MEDICINE | Facility: OTHER | Age: 81
End: 2021-12-06
Payer: MEDICARE

## 2021-12-06 VITALS
SYSTOLIC BLOOD PRESSURE: 134 MMHG | HEIGHT: 65 IN | HEART RATE: 90 BPM | WEIGHT: 169 LBS | BODY MASS INDEX: 28.16 KG/M2 | TEMPERATURE: 97.7 F | OXYGEN SATURATION: 91 % | DIASTOLIC BLOOD PRESSURE: 92 MMHG

## 2021-12-06 DIAGNOSIS — Z91.81 AT RISK FOR FALLS: ICD-10-CM

## 2021-12-06 DIAGNOSIS — H61.21 IMPACTED CERUMEN OF RIGHT EAR: ICD-10-CM

## 2021-12-06 DIAGNOSIS — R41.3 MEMORY LOSS: ICD-10-CM

## 2021-12-06 DIAGNOSIS — F32.1 CURRENT MODERATE EPISODE OF MAJOR DEPRESSIVE DISORDER, UNSPECIFIED WHETHER RECURRENT (HCC): Primary | ICD-10-CM

## 2021-12-06 DIAGNOSIS — H91.93 HEARING DIFFICULTY OF BOTH EARS: ICD-10-CM

## 2021-12-06 DIAGNOSIS — E89.0 HYPOTHYROIDISM, POSTABLATIVE: ICD-10-CM

## 2021-12-06 PROCEDURE — 99487 CPLX CHRNC CARE 1ST 60 MIN: CPT | Performed by: INTERNAL MEDICINE

## 2021-12-06 NOTE — ASSESSMENT & PLAN NOTE
Grief continues to be an active part of patient's symptomatology  -open to counseling  -recommend to restart SSRI, watch sodium with restarting  -recommend exercise

## 2021-12-06 NOTE — NON-PROVIDER
[facility] Assessment    NAME: Gaye Antoine     Patient's Primary Language: English   Patient's Care Partner(s) Name: :Macrina   Care Partners(s) Relationship to Patient: Sister         Are you still driving? No, currently trying to get license back, passed written portion with DMV, needs PCP to sign off.     Are you able to prepare your own meals and/or cook, need assistance or unable to complete? 1    Are you able to do shopping and errands, need assistance or unable to complete? 2    Are you able to do housekeeping, chores, need assistance or unable to complete? 1    Are you able to do laundry, need assistance or unable to complete?1    Are you able to manage your medications, need assistance or unable to complete? 1    Are you able to do your own money management, need assistance or unable to complete? 2    Are you able to use the phone, need assistance or unable to use the phone? 1    ADL    Are you independent, need assistance, or unable to bathe yourself? 1    Are you independent, need assistance, or unable to dress yourself? 1    Are you independent, need assistance, or unable to feed yourself? 1    Are you independent, need assistance, or unable to get up out of a chair or off a bed? 1    Are you independent, need assistance, or unable to use the toilet by yourself?1    Are you aware when you need to use the toilet? Yes     If no, please describe the problem you are experiencing.       Assist Devices: Patient uses:  Cane: No   Walker: Yes, does not use   Wheelchair: No   Ostomy: No   Other tubes: No   Amputations:  No   Chronic oxygen use: No   CPAP/BIPAP use: No   : Denies any urinary leakage during the last 6 months  If you have a problem with continence, do you wear special garments? No       Fall Screening:   Any falls within the last year? 1   Any injuries associated with the falls? Yes   -If yes, what injury? Black eye   Do you worry about falling? Yes   Do you feel unsteady when standing or walking? No    You have symptoms of lightheadedness or dizziness from lying to standing? No     Any throw rugs on floor? Yes   Do you have stairs? Yes   Do you have handrails on all stairs. Yes   Good lighting in all hallways. Yes   Any difficulty hearing? No   Wears hearing aids: No   Any difficulty with vision? No   Last eye exam: Aug 2021      FRAIL SCALE    Fatigue:  How much time during the past 4 weeks did you feel tired:  1=All the time, 2=Most the time, 3=Some of the time, 4=A little of the time,  5=None of the time  Answer: 3  (responses of 1 or 2 are scored as 1 and all others as 0)  SCORE: 0    Resistance:  By yourself and not using aids, do you have any difficulty walking up 10 steps without resting?  1=Yes, 0=No  SCORE: 1    Ambulation:  By yourself and not using aids, do you have any difficulty walking a couple of blocks?  1=Yes, 0=No  SCORE: 0    Illnesses: Did a doctor ever tell you that you have: (illness)?  How many (see list)    Hypertension: No  Diabetes: No   Cancer (other than minor skin cancer): No   Chronic Lung Disease: Yes, COPD   Heart attack: No   Congestive Heart Failure: No   Angina: No   Asthma: No   Arthritis: Yes, Rheumatoid   Stroke: Yes ~   Kidney Disease: No     The total illnesses (0-11) are recorded as 0-4 = 0 and 5-11 = 1  Total Illness Score: 3    SCORE: 0    Loss of Weight over last year:   If noted above, one year ago, how much did you weigh:?  ~Same   (percent change is computed as: weight 1 year ago- current weight/weight 1 year ago. X 100.  (more than 5% weight loss is scored as 1, and less than 5% is 0)    Score: 0    Total Score: 1    Scorin =  Robust Health  1-2=Pre-frail  3-5=Frail    Ask if they have been admitted to the hospital in the past three month: No       MiniCog:   Words asked: [place] Cloud   Time 3:05     0/2; 2/3 for memory recall  Total score: 2/5           Depression Screen - PHQ- 9   0 = Not at all, 1 = Several days,  2 = More than half the  days,  3 = Nearly every day     Little interest or pleasure in doing things? 3  Feeling down, depressed , or hopeless? 3  Trouble falling or staying asleep, or sleeping too much?  3  Feeling tired or having little energy? 2  Poor appetite or overeating?  2  Feeling bad about yourself - or that you are a failure or have let yourself or your family down?  3  Trouble concentrating on things, such as reading the newspaper or watching television? 2  Moving or speaking so slowly that other people could have noticed.  Or the opposite - being so fidgety or restless that you have been moving around a lot more than usual? 0  Thoughts that you would be better off dead, or of hurting yourself? 1  Patient Health Questionnaire Score:  19    Anxiety Screen/CYNTHIA-7 Questionnaire  0 = Not at all, 1 = Several days,  2 = More than half the days,  3 = Nearly every day     Feeling nervous, anxious, or on edge: 3  Not being able to stop or control worrying: 3  Worrying too much about different things: 3  Trouble relaxin  Being so restless that it's hard to sit still: 0  Becoming easily annoyed or irritable: 0  Feeling afraid as if something awful might happen: 3  Total: 12    Interpretation of CYNTHIA 7 Total Score   Score Severity :  0-4 No Anxiety   5-9 Mild Anxiety  10-14 Moderate Anxiety  15-21 Severe Anxiety      I

## 2021-12-06 NOTE — ASSESSMENT & PLAN NOTE
One recent fall, reported to be mechanical  Recommend patient get new shoes  If continued issues with gait, consider PT referral or podiatrist referral.   Recommend DEXA scan to assess for bone health and start bisphosphonate if appropriate. .   Recheck vitamin D given mildly low level in 2016. .

## 2021-12-06 NOTE — ASSESSMENT & PLAN NOTE
Patient reports that she takes the medication regularly, but sometimes with food and sometimes without food  -recommend repeat TSH and  patient on taking a consistnent way (with our without food) if abnormal, otherwise
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
## 2021-12-06 NOTE — PATIENT INSTRUCTIONS
We thank you for taking the time to share during your medical visit with our team of providers at the [facility]. During the medical visit we completed a Comprehensive Geriatric Assessment with a  and physician, all specialty trained in Geriatrics.     Below are our Age Friendly recommendations. Our recommendations are shaped using the 4 M’s model of care. In using the 4 M’s we approach care from starting with What Matters most to you.  We then use Mobility, Medications and Mentation to support that. Please note, our recommendations are another point on your health care journey. We hope the time we spent together helps you achieve What Matters most to you.    What Matters     During the visit you shared that you were hoping to address worry, and transporation. You also shared that your dog Christel gives you meaning in life.    We appreciate your openness and honesty with sharing this personal information. In helping you achieve What Matters we know that happiness, safety, support, companionship, symptom control, adequate sleep, and advanced planning can help many of us achieve What Matters. Below are some resources that we discussed in clinic that we think will help you achieve What Matters, especially some information about advance care planning.     Mentation    During your assessment we felt that your mood may be a playing a role in your health. To better address your mood, we recommend a variety of medical, pharmacological, and behavioral health approaches to treatment.     Medical: Sometimes depression or anxiety can be secondary to medical conditions. I recommended some blood tests to your primary care provider to make sure medical issues are not playing a role  (Provider to add recommendations on meds side effects that could result in depression, disease, thyroid)    Pharmacological: Sometimes medications can be a helpful tool in dealing with our mood. Ask Kvng Browning M.D.  about restarting citalopram, as this can be helpful for your mood.     Behavioral Health: When we think about our behavior, we think of both of our cognitive and physical behaviors. Sometimes working through some of our thoughts and/or emotions can be helpful. We recommend seeing a counselor/therapist to discuss some of these issues. Physical activity can help us improve our mood. We recommend you be active to help you achieve What Matters most to you.     As you work through your treatment plan, please make sure to keep active and doing those things that give you meaning in life. Thinking, memory, mood, and mental health matter! Just like your body changes with age, so can your brain. Ways to support mentation include being engaged in meaningful activities and managing stress and anxiety. Trying new ways to relax and connect may be helpful.    During your assessment, we noted that your cognition may be affecting your ability to function in daily life. Support from family and friends can be very important as you continue this part of your health care journey. Below are the specific recommendations we discussed during your visit. Please continue to follow up with your provider about any concerns or sudden changes that you or your family note.       Medications  During our assessment, we reviewed your medications to make sure they are supporting What Matters most you. Based on our discussion we thought there may be opportunity to adjust some medications to help you better achieve your health goals.   Mobility   During our assessment we were happy to see how active you are! We encourage you to keep this up. At the [facility] we focus on how mobility can help you achieve What Matter’s. In general, we recommend all adults be active every day, to the best of their ability.     Other Recommendations  Medical Recommendations:  • Find some better fitting shoes. [employer] store is an option.   • Get your hearing  checked, this can be helpful for your memory and mood  • Try and exercise everyday, walking is a great way to exercise.   • It is great to hear you are getting your COVID-19 vaccine booster.     Social Work Care Plan    • Consider a home safety evaluation. An option is [facility] - [address] [place] [address] ([phone]). [facility] can evaluate your home and also help install grab bars, etc. However, there is a cost that is not covered by insurance.      • Consider reviewing your advance directives for medical and financial decisions. An advance directive is meant to help you plan ahead and let others know what kind of care you want. It is used to guide your loved ones and health care team in making clear decisions about your health care if you can't make medical decisions by yourself. Provided advanced directive forms. Consider consulting with your .    • Consider establishing care with a counselor whom you can speak with regularly. We discussed the benefit of counseling. See list of behavioral health providers. [facility], [phone]; [address] [place], [place]    • Start a modest exercise plan- start with one activity (walking, going to the gym) one or two times a week for 15 minutes. After one week add a third day of exercise. After three weeks (from the start) increase exercise to 30 minutes if possible. Over time, increase to five days per week for 30 minutes each time.    • Practice good sleep hygiene. Develop a pattern before going to bed. Avoid reading and watching TV in bed. Limit naps to 20 minutes during the day. Provided handout.    • Consider a fiduciary to help with bill payment. An option is [place] [facility], [address] [place] [address], [address], [phone].    • Transportation services include [employer] (http://[place]); [phone];  And [facility]; [phone]  •     List of Handouts:  • Dementia care partners , Falls prevention,  Sleep , [facility] and MIND Diet    Referral Recommendations (to be ordered by your primary care provider)  • Counseling/Therapy, Driving Evaluation - Occupational Therapy  and Home Safety Evaluation     To follow up with our recommendation (orders, referrals, med changes, etc) we encourage you to follow with their primary care provider before implementing these changes.

## 2021-12-06 NOTE — ASSESSMENT & PLAN NOTE
The patient's cognition does appear to be effecting their ADL or IADL status. This does suggest the presence of a neurocognitive disorder. Delirium, mood, and acute psychiatric conditions were considered in the differential. Patient does have subjective complaints and a physical exam finding that does warrant continued work up.History of old stroke, no clear neuro-muscular concerns on PE (wohter than gait, which may be due to footwear/feet)     -Recommendations  -treat depression / grief   -recommend CBC, CMP, B12, TSH, RPR and HIV to r/o reversible causes of dementia  -no clear role for repeat head imaging, but those with h/o of stroke are also more likely to get depressed

## 2021-12-06 NOTE — PROGRESS NOTES
Interdisciplinary Comprehensive Geriatric Assessment (CGA) - [facility]    Brief Overview of assessment:    Our comprehensive geriatric assessment (CGA) team is comprised of a medical assistant (MA), medical provider, pharmacist, and , all of whom create a note during the assessment. The patient's assessment starts with the MA who screens the patient for many common geriatric syndromes. If possible, the MA does these assessments with the patient alone. The MA then introduces the patient and (s) to the rest of the CGA team and shares information collected during the screening assessments. The CGA team then completes the assessment and provides recommendations over the next 90 minutes.  We provide recommendations modeling that of an Age-Friendly Health System with the 4 Ms of Care (What Matters, Mentation, Medications, and Mobility). For any questions about our assessment or recommendations, please reach out to our office ([phone]). To learn more about providing age friendly care to your patients consider visiting this web site to learn more. http://www.ihi.org/Engage/Initiatives/Age-Friendly-Health-Systems/Pages/default.    In general, we encourage patients to follow-up with their primary care provider prior to implementing the recommendations (orders, referrals, med changes, etc) discussed during the visit today. See A/P and patient instructions below.      Visit Note:  Chief Complaint   Patient presents with   • Health Risk Assessments For Seniors     [facility] Assessment      Patient Name: Gaye Antoine  Patient Age; 81 y.o.  Date of Consult: 12/6/2021  Referring Provider: Self Referral for anxiety/memory  PCP: Kvng Browning M.D.    Interdisciplinary Geriatric Consult Provided By:   Artie Milligan M.D.  Katherine Murphy, PhD, [facility]    Assessment and Plan:   80 y/o F with a PMH of grief/depression,hypothyroidsim coming in for CGA. She appears  to have significant grief that is impacting her life. We recommend re/starting treatment for this as she continues to get support from her sister and tie into other community support networks.     Hypothyroidism, postablative  Patient reports that she takes the medication regularly, but sometimes with food and sometimes without food  -recommend repeat TSH and  patient on taking a consistnent way (with our without food) if abnormal, otherwise     Major depressive disorder with current active episode  Grief continues to be an active part of patient's symptomatology  -open to counseling  -recommend to restart SSRI, watch sodium with restarting  -recommend exercise     At risk for falls  One recent fall, reported to be mechanical  Recommend patient get new shoes  If continued issues with gait, consider PT referral or podiatrist referral.   Recommend DEXA scan to assess for bone health and start bisphosphonate if appropriate. .   Recheck vitamin D given mildly low level in 2016. .    Memory loss  The patient's cognition does appear to be effecting their ADL or IADL status. This does suggest the presence of a neurocognitive disorder. Delirium, mood, and acute psychiatric conditions were considered in the differential. Patient does have subjective complaints and a physical exam finding that does warrant continued work up.History of old stroke, no clear neuro-muscular concerns on PE (wohter than gait, which may be due to footwear/feet)     -Recommendations  -treat depression / grief   -recommend CBC, CMP, B12, TSH, RPR and HIV to r/o reversible causes of dementia  -no clear role for repeat head imaging, but those with h/o of stroke are also more likely to get depressed         Hearing difficulty of both ears  Recommend audiology check  Ear lavage of R ear today only partially successful.     Patient Instructions     We thank you for taking the time to share during your medical visit with our team of providers at the  [facility]. During the medical visit we completed a Comprehensive Geriatric Assessment with a  and physician, all specialty trained in Geriatrics.     Below are our Age Friendly recommendations. Our recommendations are shaped using the 4 M’s model of care. In using the 4 M’s we approach care from starting with What Matters most to you.  We then use Mobility, Medications and Mentation to support that. Please note, our recommendations are another point on your health care journey. We hope the time we spent together helps you achieve What Matters most to you.    What Matters     During the visit you shared that you were hoping to address worry, and transporation. You also shared that your dog Christel gives you meaning in life.    We appreciate your openness and honesty with sharing this personal information. In helping you achieve What Matters we know that happiness, safety, support, companionship, symptom control, adequate sleep, and advanced planning can help many of us achieve What Matters. Below are some resources that we discussed in clinic that we think will help you achieve What Matters, especially some information about advance care planning.     Mentation    During your assessment we felt that your mood may be a playing a role in your health. To better address your mood, we recommend a variety of medical, pharmacological, and behavioral health approaches to treatment.     Medical: Sometimes depression or anxiety can be secondary to medical conditions. I recommended some blood tests to your primary care provider to make sure medical issues are not playing a role  (Provider to add recommendations on meds side effects that could result in depression, disease, thyroid)    Pharmacological: Sometimes medications can be a helpful tool in dealing with our mood. Ask Kvng Browning M.D. about restarting citalopram, as this can be helpful for your mood.     Behavioral Health: When we think  about our behavior, we think of both of our cognitive and physical behaviors. Sometimes working through some of our thoughts and/or emotions can be helpful. We recommend seeing a counselor/therapist to discuss some of these issues. Physical activity can help us improve our mood. We recommend you be active to help you achieve What Matters most to you.     As you work through your treatment plan, please make sure to keep active and doing those things that give you meaning in life. Thinking, memory, mood, and mental health matter! Just like your body changes with age, so can your brain. Ways to support mentation include being engaged in meaningful activities and managing stress and anxiety. Trying new ways to relax and connect may be helpful.    During your assessment, we noted that your cognition may be affecting your ability to function in daily life. Support from family and friends can be very important as you continue this part of your health care journey. Below are the specific recommendations we discussed during your visit. Please continue to follow up with your provider about any concerns or sudden changes that you or your family note.       Medications  During our assessment, we reviewed your medications to make sure they are supporting What Matters most you. Based on our discussion we thought there may be opportunity to adjust some medications to help you better achieve your health goals.   Mobility   During our assessment we were happy to see how active you are! We encourage you to keep this up. At the [facility] we focus on how mobility can help you achieve What Matter’s. In general, we recommend all adults be active every day, to the best of their ability.     Other Recommendations  Medical Recommendations:  • Find some better fitting shoes. The [employer] store is an option.   • Get your hearing checked, this can be helpful for your memory and mood  • Try and exercise everyday, walking is a great way to  exercise.   • It is great to hear you are getting your COVID-19 vaccine booster.     Social Work Care Plan    • Consider a home safety evaluation. An option is [facility] - [address] [place] [address] ([phone]). [facility] can evaluate your home and also help install grab bars, etc. However, there is a cost that is not covered by insurance.      • Consider reviewing your advance directives for medical and financial decisions. An advance directive is meant to help you plan ahead and let others know what kind of care you want. It is used to guide your loved ones and health care team in making clear decisions about your health care if you can't make medical decisions by yourself. Provided advanced directive forms. Consider consulting with your .    • Consider establishing care with a counselor whom you can speak with regularly. We discussed the benefit of counseling. See list of behavioral health providers. [facility] [place], [phone]; [address] [address]    • Start a modest exercise plan- start with one activity (walking, going to the gym) one or two times a week for 15 minutes. After one week add a third day of exercise. After three weeks (from the start) increase exercise to 30 minutes if possible. Over time, increase to five days per week for 30 minutes each time.    • Practice good sleep hygiene. Develop a pattern before going to bed. Avoid reading and watching TV in bed. Limit naps to 20 minutes during the day. Provided handout.    • Consider a fiduciary to help with bill payment. An option is [facility], [address] [place] [address], [address], [phone].    • Transportation services include [employer] (http[employer]); [phone];  And [facility]; [phone]  •     List of Handouts:  • Dementia care partners , Falls prevention, Sleep , [facility] and MIND Diet    Referral Recommendations (to be ordered by your primary care  provider)  • Counseling/Therapy, Driving Evaluation - Occupational Therapy  and Home Safety Evaluation     To follow up with our recommendation (orders, referrals, med changes, etc) we encourage you to follow with their primary care provider before implementing these changes.    History of Present Illness:   Patient's Primary Language: English   Patient's Care Partner(s) Name: :Macrina   Care Partners(s) Relationship to Patient: Sister   History provided by:Patient and Sibbling  Patient is a Fair historian    81 y.o. 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 Patient lost her son last year and is having a hard time transitioning to a life without her. Patient could benefit from help with finances, and more support at home.     Patient finds value in her dog.      Review of System:   ROS   No arthritic pain  Hearing - okay, sister thinks hearing is not so good  Vision  - okay  Appetite good  Weight Loss - none  Diet - getting MOW as patient was not cooking   Exercise - not currently; likes walks  Dysphagia - none  Memory - none  Fatigue - some of the time;   Sleep - has trouble iniating sleep; can take old pain pills to help her sleep; does not wake up in the night. Will drift off while watching the news  Dental Problems - two chipped teeth; no pain in mouht  Constipation - no  Diarrhea- no  Urinary Incontinence - no     Fall Screening:   Any falls within the last year? 1; walking up to [employer]; five blocks from ehrwe she lives; no LOC; thinks she tripped over something;   Any injuries associated with the falls? Yes   -If yes, what injury? Black eye;  Do you worry about falling? Yes   Do you feel unsteady when standing or walking? No   You have symptoms of lightheadedness or dizziness from lying to standing? No      Mood - has felt  worried over the last two weeks. 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 Has gotten a couple of driving tickets recently and that is part of the origin of her   SI - has thoughts everyday, but says; says her Advent prevents her from killing herself.   Any personal history of depression/anxiety/psychiatric disorder - saw a therapist in the past, non currently.     Cognitive ROS  Memory concerns  - history is limited has trouble remember appointments; has had a cluttered home since before her son passed away. Now getting MOW, cannot drive to recent tickets and non-renewal during COVID- working on getting this back currently.   Advance Directives - does not have   History of TBI - none  Hallucinations - none  Tremor - none  Rigidity - none  Gait Changes - none  Alcohol use - not often  History of PVD/MI/Stroke - stroke in 2017  FH of dementia - none  Previous labs - has gotten labs with [facility] none today  Previous Head Imaging. - MRI julio 6/10/2008 with acute infarction of right corona radiatra extending into post. Limb of IC; old stroke in R frontal corona radiata     Legend  1- independent  2-needs assistance  3 - unable to complete    IADL  Are you still driving? No, currently trying to get license back, passed written portion with DMV, needs PCP to sign off.      Are you able to prepare your own meals and/or cook, need assistance or unable to complete? 1     Are you able to do shopping and errands, need assistance or unable to complete? 2     Are you able to do housekeeping, chores, need assistance or unable to complete? 1     Are you able to do laundry, need assistance or unable to complete?1     Are you able to manage your medications, need assistance or unable to complete? 1     Are you able to do your own money management, need assistance or unable to complete? 2     Are you able to use the phone, need assistance or unable to use the phone? 1     ADL     Are you  independent, need assistance, or unable to bathe yourself? 1     Are you independent, need assistance, or unable to dress yourself? 1     Are you independent, need assistance, or unable to feed yourself? 1     Are you independent, need assistance, or unable to get up out of a chair or off a bed? 1     Are you independent, need assistance, or unable to use the toilet by yourself?1     Are you aware when you need to use the toilet? Yes     I reviewed the patient's MiniCog, PHQ9, GAD7, falls, and frailty screens as documented in the Medical Assistant's note.   FRAIL Scale Score: 1/5  Mini Cog - 2/5; abnormal clock; missed on number, but in correct directions; 2/3 for memory recall  PHQ 9 score - 12  CYNTHIA 7 - 19    Social History     Socioeconomic History   • Marital status:      Spouse name: Not on file   • Number of children: 1   • Years of education: some colle   • Highest education level: Not on file   Occupational History   • Occupation: Ret [employer]     Employer: Retired   Tobacco Use   • Smoking status: Current Some Day Smoker     Packs/day: 0.50     Years: 50.00     Pack years: 25.00     Types: Cigarettes   • Smokeless tobacco: Never Used   • Tobacco comment: Previous heavy 1 ppd smoker.    Vaping Use   • Vaping Use: Never used   Substance and Sexual Activity   • Alcohol use: Yes     Alcohol/week: 1.2 oz     Types: 2 Shots of liquor per week     Comment: rare   • Drug use: No   • Sexual activity: Not Currently     Comment:  2005   Other Topics Concern   • Not on file   Social History Narrative    Retired  for [employer].     2005    49 yo son.    No grandchildren.    Sister with PCKD may have been 1/2 sister (family secret)     Social Determinants of Health     Financial Resource Strain:    • Difficulty of Paying Living Expenses: Not on file   Food Insecurity:    • Worried About Running Out of Food in the Last Year: Not on file   • Ran Out of Food in the  "Last Year: Not on file   Transportation Needs:    • Lack of Transportation (Medical): Not on file   • Lack of Transportation (Non-Medical): Not on file   Physical Activity:    • Days of Exercise per Week: Not on file   • Minutes of Exercise per Session: Not on file   Stress:    • Feeling of Stress : Not on file   Social Connections:    • Frequency of Communication with Friends and Family: Not on file   • Frequency of Social Gatherings with Friends and Family: Not on file   • Attends Sikh Services: Not on file   • Active Member of Clubs or Organizations: Not on file   • Attends Club or Organization Meetings: Not on file   • Marital Status: Not on file   Intimate Partner Violence:    • Fear of Current or Ex-Partner: Not on file   • Emotionally Abused: Not on file   • Physically Abused: Not on file   • Sexually Abused: Not on file   Housing Stability:    • Unable to Pay for Housing in the Last Year: Not on file   • Number of Places Lived in the Last Year: Not on file   • Unstable Housing in the Last Year: Not on file       Family History   Problem Relation Age of Onset   • Cancer Mother         breast   • Lung Disease Mother         TB   • Heart Disease Father         aneurysm   • Genitourinary () Problems Sister         polycystic kidneys     Current Outpatient Medications on File Prior to Visit   Medication Sig Dispense Refill   • SYNTHROID 175 MCG Tab TAKE 1 TABLET BY MOUTH EVERY MORNING ON A EMPTY STOMACH 90 Tablet 0     No current facility-administered medications on file prior to visit.       Physical Exam:   /92 (BP Location: Left arm, Patient Position: Sitting, BP Cuff Size: Adult)   Pulse 90   Temp 36.5 °C (97.7 °F)   Ht 1.648 m (5' 4.9\")   Wt 76.7 kg (169 lb)   LMP 03/01/2005   SpO2 91%   BMI 28.21 kg/m²   Physical Exam  HENT:      Right Ear: Ear canal and external ear normal. There is impacted cerumen.      Left Ear: Tympanic membrane, ear canal and external ear normal. There is no " impacted cerumen.      Ears:      Comments: After ear lavage Right ear TM was visible   Cardiovascular:      Rate and Rhythm: Normal rate and regular rhythm.      Pulses:           Radial pulses are 2+ on the right side and 2+ on the left side.        Dorsalis pedis pulses are 2+ on the right side and 2+ on the left side.      Comments: Split S2 with inspiration   Pulmonary:      Breath sounds: Normal breath sounds and air entry.   Musculoskeletal:      Right foot: Deformity and bunion present.      Left foot: Deformity and bunion present.   Neurological:      Mental Status: She is alert.      Cranial Nerves: Cranial nerves are intact.      Sensory: Sensation is intact.      Motor: Motor function is intact. No weakness, tremor or abnormal muscle tone.      Coordination: Coordination is intact. Romberg sign negative. Coordination normal. Finger-Nose-Finger Test and Heel to Shin Test normal. Rapid alternating movements normal.      Gait: Gait abnormal (may be related to feet/shoes. ).       Labs: no recent labs to review    Imaging:  MR-BRAIN-WITH & W/O    Impression  IMPRESSION:    1. 9 MM AREA OF ACUTE INFARCTION WITHIN THE RIGHT CORONA RADIATA  EXTENDING INTO THE POSTERIOR LIMB INTERNAL CAPSULE/PUTAMEN.    2. MILD CEREBRAL ATROPHY.    3. AREA OF CHRONIC ISCHEMIA WITHIN THE RIGHT FRONTAL CORONA RADIATA  ADJACENT TO THE FRONTAL HORN MEASURING 5 MM IN SIZE.    4. MULTIPLE PUNCTATE AREAS OF INCREASED FLAIR AND T2-WEIGHTED SIGNAL  WITHIN THE CENTRUM SEMIOVALE AND CORONA RADIATA, WHICH ARE NONSPECIFIC  AND LIKELY DUE TO SMALL VESSEL ISCHEMIC CHANGE.    5. 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    6. NO EVIDENCE OF MASS OR ABNORMAL FOCUS OF CONTRAST ENHANCEMENT WITHIN  THE BRAIN.    CBG/jamarcus/araceli    Read By YARON POSADA MD on Romeo 10 2008 10:00AM  : JAMARCUS Transcription Date: Romeo 10 2008  7:40PM  THIS DOCUMENT HAS BEEN ELECTRONICALLY SIGNED BY: YARON POSADA MD on Jun 11 2008  9:41AM      My  total time spent caring for the patient on the day of the encounter was 80 minutes.   This does not include time spent on separately billable procedures/tests.    This note was partially dictated with voice recognition software, for any confusion please do not hesitate to contact me.

## 2021-12-22 ENCOUNTER — OFFICE VISIT (OUTPATIENT)
Dept: MEDICAL GROUP | Age: 81
End: 2021-12-22
Payer: MEDICARE

## 2021-12-22 VITALS
OXYGEN SATURATION: 91 % | HEIGHT: 65 IN | DIASTOLIC BLOOD PRESSURE: 82 MMHG | TEMPERATURE: 98.3 F | HEART RATE: 68 BPM | RESPIRATION RATE: 16 BRPM | BODY MASS INDEX: 28.76 KG/M2 | SYSTOLIC BLOOD PRESSURE: 118 MMHG | WEIGHT: 172.6 LBS

## 2021-12-22 DIAGNOSIS — Z23 NEED FOR VACCINATION: ICD-10-CM

## 2021-12-22 DIAGNOSIS — E89.0 HYPOTHYROIDISM, POSTABLATIVE: ICD-10-CM

## 2021-12-22 DIAGNOSIS — Z02.9 ADMINISTRATIVE ENCOUNTER: ICD-10-CM

## 2021-12-22 DIAGNOSIS — R53.83 OTHER FATIGUE: ICD-10-CM

## 2021-12-22 DIAGNOSIS — E78.00 PURE HYPERCHOLESTEROLEMIA: ICD-10-CM

## 2021-12-22 PROCEDURE — G0008 ADMIN INFLUENZA VIRUS VAC: HCPCS | Performed by: FAMILY MEDICINE

## 2021-12-22 PROCEDURE — 99214 OFFICE O/P EST MOD 30 MIN: CPT | Mod: 25 | Performed by: FAMILY MEDICINE

## 2021-12-22 PROCEDURE — 90662 IIV NO PRSV INCREASED AG IM: CPT | Performed by: FAMILY MEDICINE

## 2021-12-22 NOTE — PROGRESS NOTES
This medical record contains text that has been entered with the assistance of computer voice recognition and dictation software.  Therefore, it may contain unintended errors in text, spelling, punctuation, or grammar      Chief Complaint   Patient presents with   • Paperwork     DMV         Gaye Antoine is a 81 y.o. female here evaluation and management of: DMV paperwork      HPI:     1. Hypothyroidism, postablative  Synthroid 175 mcg p.o. daily    The patient denies any new fatigue, cold/heat intolerance, weight gain/weight loss, diarrhea/constipation, dry skin, myalgia, depressed mood, palpitations, tremmor, hair loss, and no goiter.      2. Administrative encounter  Patient also wants to have her 's license renewed.  She did receive 2 drivers violation tickets 3 months ago.  The last time she drove she states was 3 months ago.  However her sister is here and states that she is not capable of safely driving.    3. Need for vaccination  Due for flu vaccine        Current medicines (including changes today)  Current Outpatient Medications   Medication Sig Dispense Refill   • SYNTHROID 175 MCG Tab TAKE 1 TABLET BY MOUTH EVERY MORNING ON A EMPTY STOMACH 90 Tablet 0     No current facility-administered medications for this visit.     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  She  has a past surgical history that includes ankle fusion (1/8/2009); hip cannulated screw (6/15/2009); other (2008); appendectomy (age 7); cholecystectomy; parathyroid  "exploration (2012); rhytidectomy; and mammoplasty reduction ().  Social History     Tobacco Use   • Smoking status: Current Some Day Smoker     Packs/day: 0.50     Years: 50.00     Pack years: 25.00     Types: Cigarettes   • Smokeless tobacco: Never Used   • Tobacco comment: Previous heavy 1 ppd smoker.    Vaping Use   • Vaping Use: Never used   Substance Use Topics   • Alcohol use: Yes     Alcohol/week: 1.2 oz     Types: 2 Shots of liquor per week     Comment: rare   • Drug use: No     Social History     Social History Narrative    Retired  for [employer].         51 yo son.    No grandchildren.    Sister with PCKD may have been 1/2 sister (family secret)     Family History   Problem Relation Age of Onset   • Cancer Mother         breast   • Lung Disease Mother         TB   • Heart Disease Father         aneurysm   • Genitourinary () Problems Sister         polycystic kidneys     Family Status   Relation Name Status   • Mo          CHF, chronic lung dz   • Fa          abd aneurysm   • Sis          polycystic kidney dz (1/2 sister)   • Sis  Alive   • Sis  (Not Specified)         ROS    The pertinent  ROS findings can be seen in the HPI above.     All other systems reviewed and are negative     Objective:     /82 (BP Location: Right arm, Patient Position: Sitting, BP Cuff Size: Large adult)   Pulse 68   Temp 36.8 °C (98.3 °F) (Temporal)   Resp 16   Ht 1.648 m (5' 4.9\")   Wt 78.3 kg (172 lb 9.6 oz)   SpO2 91%  Body mass index is 28.81 kg/m².      Physical Exam:    Constitutional: Alert, no distress.  Skin: No suspicious lesions  Eye: Equal, round and reactive, conjunctiva clear, lids normal.  ENMT: Lips without lesions, good dentition, oropharynx clear.  Neck: Trachea midline, no masses, no thyromegaly. No cervical or supraclavicular lymphadenopathy.  Respiratory: Unlabored respiratory effort, lungs clear to auscultation, no wheezes, no " tiago.  Cardiovascular: Normal S1, S2, no murmur, no edema  Abdomen: Soft, non-tender, no masses, no hepatosplenomegaly.  Unable to perform serial sevens, could not recall 3 words,      Assessment and Plan:   The following treatment plan was discussed      1. Hypothyroidism, postablative     We will obtain new labs to update clinical profile.  Then we will adjust therapy as needed.      2. Administrative encounter  I do not think she is safe to drive at this time  I will reevaluate her with a more detailed [place] exam in 3 months      3. Need for vaccination    Vaccine was administered today without adverse event.    - INFLUENZA VACCINE, HIGH DOSE (65+ ONLY)    4. Pure hypercholesterolemia    We will obtain new labs to update clinical profile.  Then we will adjust therapy as needed.    - Comp Metabolic Panel; Future  - CBC WITH DIFFERENTIAL; Future  - Lipid Profile; Future  - VITAMIN D,25 HYDROXY; Future    5. Other fatigue    We will obtain new labs to update clinical profile.  Then we will adjust therapy as needed.    - TSH+FREE T4  - T3 FREE; Future            Instructed to Follow up in clinic or ER for worsening symptoms, difficulty breathing, lack of expected recovery, or should new symptoms or problems arise.    Followup: Return in about 3 months (around 3/22/2022) for Reevaluation, labs.

## 2022-01-10 RX ORDER — LEVOTHYROXINE SODIUM 175 MCG
175 TABLET ORAL EVERY MORNING
Qty: 90 TABLET | Refills: 0 | Status: SHIPPED | OUTPATIENT
Start: 2022-01-10 | End: 2022-04-29

## 2022-01-11 ENCOUNTER — TELEPHONE (OUTPATIENT)
Dept: INTERNAL MEDICINE | Facility: OTHER | Age: 82
End: 2022-01-11

## 2022-01-11 NOTE — TELEPHONE ENCOUNTER
I've spoken to sister Violette who initiated the CGA. She says they have gone through the packet and returned the survey but Gaye is resistant to ALL recommendations. Did review with PCP, he is still telling her she is not safe to drive. Violette will continue to encourage her to follow through with them. Concerned that her sister has reconnected with an old friend and says she may move to [place] with this person. Nothing further needed at this time.

## 2022-04-29 RX ORDER — LEVOTHYROXINE SODIUM 175 MCG
175 TABLET ORAL EVERY MORNING
Qty: 90 TABLET | Refills: 3 | Status: SHIPPED | OUTPATIENT
Start: 2022-04-29 | End: 2022-08-02

## 2022-10-13 ENCOUNTER — TELEPHONE (OUTPATIENT)
Dept: HEALTH INFORMATION MANAGEMENT | Facility: OTHER | Age: 82
End: 2022-10-13

## 2022-10-13 NOTE — TELEPHONE ENCOUNTER
10Outcome: PT WILL CALL BACK TO SCHEDULE AWV    Please transfer to [facility] at [phone] when patient returns call.      Attempt # 1

## 2022-10-26 ENCOUNTER — TELEPHONE (OUTPATIENT)
Dept: INTERNAL MEDICINE | Facility: OTHER | Age: 82
End: 2022-10-26
Payer: MEDICARE

## 2022-10-26 NOTE — TELEPHONE ENCOUNTER
Sister Macrina @ [phone] or [phone] accompanied Gaye to C[facility] last Dec. She is very worried about her decreased cognition. Gaye lives alone with her dog and the  got a hold of Macrina and reports that the dog is peeing all over the carpet and Gaye just stands there with a blank stare. Macrina attempted POA in past but sister got upset. Doesn't think she'll do a f/u CGA. Macrina is tearful. Please call with recommendations on how to proceed.

## 2022-12-05 ENCOUNTER — TELEPHONE (OUTPATIENT)
Dept: MEDICAL GROUP | Age: 82
End: 2022-12-05

## 2023-09-25 ENCOUNTER — APPOINTMENT (OUTPATIENT)
Dept: RADIOLOGY | Facility: MEDICAL CENTER | Age: 83
DRG: 682 | End: 2023-09-25
Attending: EMERGENCY MEDICINE
Payer: MEDICARE

## 2023-09-25 ENCOUNTER — HOSPITAL ENCOUNTER (INPATIENT)
Facility: MEDICAL CENTER | Age: 83
LOS: 3 days | DRG: 682 | End: 2023-09-28
Attending: EMERGENCY MEDICINE | Admitting: STUDENT IN AN ORGANIZED HEALTH CARE EDUCATION/TRAINING PROGRAM
Payer: MEDICARE

## 2023-09-25 DIAGNOSIS — N30.00 ACUTE CYSTITIS WITHOUT HEMATURIA: ICD-10-CM

## 2023-09-25 DIAGNOSIS — J96.01 ACUTE RESPIRATORY FAILURE WITH HYPOXIA (HCC): ICD-10-CM

## 2023-09-25 DIAGNOSIS — S09.90XA CLOSED HEAD INJURY, INITIAL ENCOUNTER: ICD-10-CM

## 2023-09-25 DIAGNOSIS — G93.40 ACUTE ENCEPHALOPATHY: ICD-10-CM

## 2023-09-25 DIAGNOSIS — I50.9 ACUTE CONGESTIVE HEART FAILURE, UNSPECIFIED HEART FAILURE TYPE (HCC): ICD-10-CM

## 2023-09-25 DIAGNOSIS — E03.9 HYPOTHYROIDISM, UNSPECIFIED TYPE: ICD-10-CM

## 2023-09-25 PROBLEM — F03.90 DEMENTIA (HCC): Status: ACTIVE | Noted: 2023-09-25

## 2023-09-25 PROBLEM — G93.89 ENCEPHALOMALACIA: Status: ACTIVE | Noted: 2023-09-25
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
## 2023-09-25 PROBLEM — J81.1 PULMONARY EDEMA: Status: ACTIVE | Noted: 2023-09-25

## 2023-09-25 PROBLEM — N17.9 ACUTE KIDNEY INJURY (HCC): Status: ACTIVE | Noted: 2023-09-25

## 2023-09-25 PROBLEM — E87.6 HYPOKALEMIA: Status: ACTIVE | Noted: 2023-09-25

## 2023-09-25 LAB
ALBUMIN SERPL BCP-MCNC: 4.2 G/DL (ref 3.2–4.9)
ALBUMIN/GLOB SERPL: 2.1 G/DL
ALP SERPL-CCNC: 78 U/L (ref 30–99)
ALT SERPL-CCNC: 12 U/L (ref 2–50)
ANION GAP SERPL CALC-SCNC: 15 MMOL/L (ref 7–16)
AST SERPL-CCNC: 29 U/L (ref 12–45)
BASOPHILS # BLD AUTO: 0.3 % (ref 0–1.8)
BASOPHILS # BLD: 0.02 K/UL (ref 0–0.12)
BILIRUB SERPL-MCNC: 0.9 MG/DL (ref 0.1–1.5)
BUN SERPL-MCNC: 21 MG/DL (ref 8–22)
CALCIUM ALBUM COR SERPL-MCNC: 9.1 MG/DL (ref 8.5–10.5)
CALCIUM SERPL-MCNC: 9.3 MG/DL (ref 8.5–10.5)
CHLORIDE SERPL-SCNC: 104 MMOL/L (ref 96–112)
CK SERPL-CCNC: 1127 U/L (ref 0–154)
CO2 SERPL-SCNC: 24 MMOL/L (ref 20–33)
CREAT SERPL-MCNC: 1.59 MG/DL (ref 0.5–1.4)
EKG IMPRESSION: NORMAL
EOSINOPHIL # BLD AUTO: 0 K/UL (ref 0–0.51)
EOSINOPHIL NFR BLD: 0 % (ref 0–6.9)
ERYTHROCYTE [DISTWIDTH] IN BLOOD BY AUTOMATED COUNT: 52.4 FL (ref 35.9–50)
GFR SERPLBLD CREATININE-BSD FMLA CKD-EPI: 32 ML/MIN/1.73 M 2
GLOBULIN SER CALC-MCNC: 2 G/DL (ref 1.9–3.5)
GLUCOSE SERPL-MCNC: 100 MG/DL (ref 65–99)
HCT VFR BLD AUTO: 45.9 % (ref 37–47)
HGB BLD-MCNC: 15 G/DL (ref 12–16)
IMM GRANULOCYTES # BLD AUTO: 0.04 K/UL (ref 0–0.11)
IMM GRANULOCYTES NFR BLD AUTO: 0.6 % (ref 0–0.9)
LYMPHOCYTES # BLD AUTO: 1.27 K/UL (ref 1–4.8)
LYMPHOCYTES NFR BLD: 19.8 % (ref 22–41)
MAGNESIUM SERPL-MCNC: 2.2 MG/DL (ref 1.5–2.5)
MCH RBC QN AUTO: 30.6 PG (ref 27–33)
MCHC RBC AUTO-ENTMCNC: 32.7 G/DL (ref 32.2–35.5)
MCV RBC AUTO: 93.7 FL (ref 81.4–97.8)
MONOCYTES # BLD AUTO: 0.43 K/UL (ref 0–0.85)
MONOCYTES NFR BLD AUTO: 6.7 % (ref 0–13.4)
NEUTROPHILS # BLD AUTO: 4.64 K/UL (ref 1.82–7.42)
NEUTROPHILS NFR BLD: 72.6 % (ref 44–72)
NRBC # BLD AUTO: 0 K/UL
NRBC BLD-RTO: 0 /100 WBC (ref 0–0.2)
NT-PROBNP SERPL IA-MCNC: 389 PG/ML (ref 0–125)
PHOSPHATE SERPL-MCNC: 3.4 MG/DL (ref 2.5–4.5)
PLATELET # BLD AUTO: 204 K/UL (ref 164–446)
PMV BLD AUTO: 10.4 FL (ref 9–12.9)
POTASSIUM SERPL-SCNC: 3.5 MMOL/L (ref 3.6–5.5)
PROT SERPL-MCNC: 6.2 G/DL (ref 6–8.2)
RBC # BLD AUTO: 4.9 M/UL (ref 4.2–5.4)
SODIUM SERPL-SCNC: 143 MMOL/L (ref 135–145)
T4 FREE SERPL-MCNC: <0.11 NG/DL (ref 0.93–1.7)
TROPONIN T SERPL-MCNC: 69 NG/L (ref 6–19)
TSH SERPL DL<=0.005 MIU/L-ACNC: 249 UIU/ML (ref 0.38–5.33)
WBC # BLD AUTO: 6.4 K/UL (ref 4.8–10.8)

## 2023-09-25 PROCEDURE — 70450 CT HEAD/BRAIN W/O DYE: CPT

## 2023-09-25 PROCEDURE — 80053 COMPREHEN METABOLIC PANEL: CPT

## 2023-09-25 PROCEDURE — 84439 ASSAY OF FREE THYROXINE: CPT

## 2023-09-25 PROCEDURE — 96372 THER/PROPH/DIAG INJ SC/IM: CPT

## 2023-09-25 PROCEDURE — 83735 ASSAY OF MAGNESIUM: CPT

## 2023-09-25 PROCEDURE — 84100 ASSAY OF PHOSPHORUS: CPT

## 2023-09-25 PROCEDURE — 93005 ELECTROCARDIOGRAM TRACING: CPT | Performed by: EMERGENCY MEDICINE

## 2023-09-25 PROCEDURE — 700105 HCHG RX REV CODE 258: Performed by: EMERGENCY MEDICINE

## 2023-09-25 PROCEDURE — 770006 HCHG ROOM/CARE - MED/SURG/GYN SEMI*

## 2023-09-25 PROCEDURE — 84484 ASSAY OF TROPONIN QUANT: CPT

## 2023-09-25 PROCEDURE — 700111 HCHG RX REV CODE 636 W/ 250 OVERRIDE (IP): Performed by: STUDENT IN AN ORGANIZED HEALTH CARE EDUCATION/TRAINING PROGRAM

## 2023-09-25 PROCEDURE — 84443 ASSAY THYROID STIM HORMONE: CPT

## 2023-09-25 PROCEDURE — 36415 COLL VENOUS BLD VENIPUNCTURE: CPT

## 2023-09-25 PROCEDURE — A9270 NON-COVERED ITEM OR SERVICE: HCPCS | Performed by: EMERGENCY MEDICINE

## 2023-09-25 PROCEDURE — 83880 ASSAY OF NATRIURETIC PEPTIDE: CPT

## 2023-09-25 PROCEDURE — A9270 NON-COVERED ITEM OR SERVICE: HCPCS | Mod: JZ | Performed by: STUDENT IN AN ORGANIZED HEALTH CARE EDUCATION/TRAINING PROGRAM

## 2023-09-25 PROCEDURE — 700102 HCHG RX REV CODE 250 W/ 637 OVERRIDE(OP): Mod: JZ | Performed by: STUDENT IN AN ORGANIZED HEALTH CARE EDUCATION/TRAINING PROGRAM

## 2023-09-25 PROCEDURE — 99285 EMERGENCY DEPT VISIT HI MDM: CPT

## 2023-09-25 PROCEDURE — 71045 X-RAY EXAM CHEST 1 VIEW: CPT

## 2023-09-25 PROCEDURE — 85025 COMPLETE CBC W/AUTO DIFF WBC: CPT

## 2023-09-25 PROCEDURE — 82550 ASSAY OF CK (CPK): CPT

## 2023-09-25 PROCEDURE — 700102 HCHG RX REV CODE 250 W/ 637 OVERRIDE(OP): Performed by: EMERGENCY MEDICINE

## 2023-09-25 PROCEDURE — 72125 CT NECK SPINE W/O DYE: CPT

## 2023-09-25 PROCEDURE — 99223 1ST HOSP IP/OBS HIGH 75: CPT | Mod: AI | Performed by: STUDENT IN AN ORGANIZED HEALTH CARE EDUCATION/TRAINING PROGRAM

## 2023-09-25 RX ORDER — POTASSIUM CHLORIDE 20 MEQ/1
40 TABLET, EXTENDED RELEASE ORAL ONCE
Status: COMPLETED | OUTPATIENT
Start: 2023-09-25 | End: 2023-09-25

## 2023-09-25 RX ORDER — FUROSEMIDE 10 MG/ML
40 INJECTION INTRAMUSCULAR; INTRAVENOUS
Status: DISCONTINUED | OUTPATIENT
Start: 2023-09-26 | End: 2023-09-28 | Stop reason: HOSPADM

## 2023-09-25 RX ORDER — BISACODYL 10 MG
10 SUPPOSITORY, RECTAL RECTAL
Status: DISCONTINUED | OUTPATIENT
Start: 2023-09-25 | End: 2023-09-28 | Stop reason: HOSPADM

## 2023-09-25 RX ORDER — HEPARIN SODIUM 5000 [USP'U]/ML
5000 INJECTION, SOLUTION INTRAVENOUS; SUBCUTANEOUS EVERY 8 HOURS
Status: DISCONTINUED | OUTPATIENT
Start: 2023-09-25 | End: 2023-09-28 | Stop reason: HOSPADM

## 2023-09-25 RX ORDER — ACETAMINOPHEN 325 MG/1
650 TABLET ORAL EVERY 6 HOURS PRN
Status: DISCONTINUED | OUTPATIENT
Start: 2023-09-25 | End: 2023-09-28 | Stop reason: HOSPADM

## 2023-09-25 RX ORDER — FUROSEMIDE 10 MG/ML
40 INJECTION INTRAMUSCULAR; INTRAVENOUS ONCE
Status: DISCONTINUED | OUTPATIENT
Start: 2023-09-25 | End: 2023-09-25

## 2023-09-25 RX ORDER — POLYETHYLENE GLYCOL 3350 17 G/17G
1 POWDER, FOR SOLUTION ORAL
Status: DISCONTINUED | OUTPATIENT
Start: 2023-09-25 | End: 2023-09-28 | Stop reason: HOSPADM

## 2023-09-25 RX ORDER — HYDRALAZINE HYDROCHLORIDE 20 MG/ML
10 INJECTION INTRAMUSCULAR; INTRAVENOUS EVERY 4 HOURS PRN
Status: DISCONTINUED | OUTPATIENT
Start: 2023-09-25 | End: 2023-09-28 | Stop reason: HOSPADM

## 2023-09-25 RX ORDER — AMOXICILLIN 250 MG
2 CAPSULE ORAL 2 TIMES DAILY
Status: DISCONTINUED | OUTPATIENT
Start: 2023-09-25 | End: 2023-09-28 | Stop reason: HOSPADM

## 2023-09-25 RX ORDER — SODIUM CHLORIDE 9 MG/ML
1000 INJECTION, SOLUTION INTRAVENOUS ONCE
Status: COMPLETED | OUTPATIENT
Start: 2023-09-25 | End: 2023-09-25

## 2023-09-25 RX ADMIN — HEPARIN SODIUM 5000 UNITS: 5000 INJECTION, SOLUTION INTRAVENOUS; SUBCUTANEOUS at 19:14

## 2023-09-25 RX ADMIN — POTASSIUM CHLORIDE 40 MEQ: 1500 TABLET, EXTENDED RELEASE ORAL at 17:20

## 2023-09-25 RX ADMIN — SODIUM CHLORIDE 1000 ML: 9 INJECTION, SOLUTION INTRAVENOUS at 17:24

## 2023-09-25 RX ADMIN — LEVOTHYROXINE SODIUM 175 MCG: 0.1 TABLET ORAL at 17:20

## 2023-09-25 ASSESSMENT — COGNITIVE AND FUNCTIONAL STATUS - GENERAL
TURNING FROM BACK TO SIDE WHILE IN FLAT BAD: A LITTLE
HELP NEEDED FOR BATHING: A LITTLE
MOBILITY SCORE: 17
WALKING IN HOSPITAL ROOM: A LITTLE
DAILY ACTIVITIY SCORE: 19
MOVING TO AND FROM BED TO CHAIR: A LITTLE
CLIMB 3 TO 5 STEPS WITH RAILING: A LOT
DRESSING REGULAR LOWER BODY CLOTHING: A LITTLE
DRESSING REGULAR UPPER BODY CLOTHING: A LITTLE
STANDING UP FROM CHAIR USING ARMS: A LITTLE
PERSONAL GROOMING: A LITTLE
SUGGESTED CMS G CODE MODIFIER MOBILITY: CK
SUGGESTED CMS G CODE MODIFIER DAILY ACTIVITY: CK
MOVING FROM LYING ON BACK TO SITTING ON SIDE OF FLAT BED: A LITTLE
TOILETING: A LITTLE

## 2023-09-25 ASSESSMENT — PAIN DESCRIPTION - PAIN TYPE: TYPE: ACUTE PAIN

## 2023-09-25 NOTE — ED PROVIDER NOTES
ED Provider Note    CHIEF COMPLAINT  Chief Complaint   Patient presents with    T-5000 GLF     BIB EMS from home. Pt was found down outside of her house by [employer]. Pt clearly had a GLF with a head injury. Lac noted to back of head. Blood was noted to stair step into house.   Pt denies LOC. Denies blood thinners or ASA.   PD was called in regards to pt living conditions. House is disarray. Pt dog was taken by animal control. EPS was notified by Bart Chatterjee.   BS: 112.     Head Injury     EXTERNAL RECORDS REVIEWED  Review of records show the patient last presented to her PCP in 2021. She has a history of hypothyroidism post-ablative and is supposed to be taking levothyroxine.     HPI/ROS  LIMITATION TO HISTORY   Select: Altered mental status / Confusion    OUTSIDE HISTORIAN(S):  EMS contributed to history as detailed below    Gaye Antoine is a 83 y.o. female who presents to the Emergency Department via EMS as a code TBI, onset prior to arrival. Per EMS the patient was at home and she went outside when [employer] arrived and reportedly fell, hitting her head. EMS states they are unsure if the fall was witnessed as [employer] staff assisted the patient back into the home, but it was the patient's mailman who called EMS. Upon EMS arrival the patient initially denied falling. However, EMS noted there was clear evidence the patient did fall as a laceration was seen upon examination and there was blood present on the patient's porch. They also noted the patient's house was in a disarrayed state with evidence of failure to thrive. PD had to be called to convince the patient to present to the ED for evaluation. Upon presenting to the ED the patient reports she does remember falling, but is only oriented to person and place. The patient denies any headache or neck pain. She also denies any major medical history or daily medications. EMS reports no known blood thinner or daily aspirin use.    PAST MEDICAL  HISTORY  Past Medical History:   Diagnosis Date    Cholelithiasis 2010    Chronic constipation     Chronic depressive disorder 1/19/2013    Chronic use of steroids 1/19/2013    CVA (cerebral infarction) 2008    L hemiparesis / ? 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, late effect of cerebrovascular disease ([facility]) 1/19/2013    Renal cyst 2010    R kidney    Rheumatoid arthritis(714.0)     S/P parathyroidectomy ([facility]) 2012    single adenoma    Tobacco use 1/19/2013        SURGICAL HISTORY  Past Surgical History:   Procedure Laterality Date    PARATHYROID EXPLORATION  2/22/2012    Performed by CURTIS DUPONT at [facility]    HIP CANNULATED SCREW  6/15/2009    Performed by DANIELLE LANG at [facility]    ANKLE FUSION  1/8/2009    Performed by WM GONSALO HERRERA at [facility]    OTHER  2008    submaxillary gland infection    MAMMOPLASTY REDUCTION  2006    complicated by abscess needing I&D    APPENDECTOMY  age 7    CHOLECYSTECTOMY      RHYTIDECTOMY          FAMILY HISTORY  Family History   Problem Relation Age of Onset    Cancer Mother         breast    Lung Disease Mother         TB    Heart Disease Father         aneurysm    Genitourinary () Problems Sister         polycystic kidneys       SOCIAL HISTORY   reports that she has been smoking cigarettes. She has a 25.0 pack-year smoking history. She has never used smokeless tobacco. She reports current alcohol use of about 1.2 oz of alcohol per week. She reports that she does not use drugs.    CURRENT MEDICATIONS  Previous Medications    SYNTHROID 175 MCG TAB    TAKE 1 TABLET BY MOUTH EVERY MORNING ON A EMPTY STOMACH       ALLERGIES  Sulfa drugs    PHYSICAL EXAM  /89   Pulse 79   Temp 36.4 °C (97.6 °F) (Temporal)   Resp  "18   Ht 1.727 m (5' 8\")   Wt 72.6 kg (160 lb)   SpO2 94%      Constitutional: Nontoxic appearing. Alert in no apparent distress.  HENT: Normocephalic, Posterior head non suturable abrasion. Bilateral external ears normal. Nose normal.  Moist mucous membranes.  Oropharynx clear.  Eyes: Pupils are equal and reactive. Conjunctiva normal.   Neck: Supple, full range of motion  Heart: Regular rate and rhythm.  No murmurs.    Lungs: No respiratory distress, normal work of breathing. Lungs clear to auscultation bilaterally.  Abdomen Soft, no distention.  No tenderness to palpation.  Musculoskeletal: Atraumatic. No obvious deformities noted.  No lower extremity edema.  Skin: Warm, Dry.  No erythema, No rash.   Neurologic: Alert and oriented x2. Moving all extremities spontaneously without focal deficits.  Psychiatric: Affect normal, Mood normal, Appears appropriate and not intoxicated.       DIAGNOSTIC STUDIES / PROCEDURES    EKG  I have independently interpreted this EKG  Results for orders placed or performed during the hospital encounter of 23   EKG (Now)   Result Value Ref Range    Report       [facility] Emergency Dept.    Test Date:  2023  Pt Name:    ELIZABETH MYERS              Department: ER  MRN:        [id number]                      Room:       [facility]  Gender:     Female                       Technician:  :        [id number]                   Requested By:AURELIA WITT  Order #:    [id number]                    Reading MD: Aurelia Witt MD    Measurements  Intervals                                Axis  Rate:       78                           P:          -17  WV:         232                          QRS:        -114  QRSD:       152                          T:          -29  QT:         431  QTc:        491    Interpretive Statements  Sinus rhythm  Prolonged WV interval  RBBB and LAFB  Compared to ECG 2015 14:59:03  First degree AV block now present  No other significant " change  Electronically Signed On 09- 18:33:12 PDT by Aurelia Rubin MD          LABS  Labs Reviewed   CBC WITH DIFFERENTIAL - Abnormal; Notable for the following components:       Result Value    RDW 52.4 (*)     Neutrophils-Polys 72.60 (*)     Lymphocytes 19.80 (*)     All other components within normal limits   COMP METABOLIC PANEL - Abnormal; Notable for the following components:    Potassium 3.5 (*)     Glucose 100 (*)     Creatinine 1.59 (*)     All other components within normal limits   TSH WITH REFLEX TO FT4 - Abnormal; Notable for the following components:    .000 (*)     All other components within normal limits   ESTIMATED GFR - Abnormal; Notable for the following components:    GFR (CKD-EPI) 32 (*)     All other components within normal limits   FREE THYROXINE - Abnormal; Notable for the following components:    Free T-4 <0.11 (*)     All other components within normal limits   TROPONIN - Abnormal; Notable for the following components:    Troponin T 69 (*)     All other components within normal limits   PROBRAIN NATRIURETIC PEPTIDE, NT - Abnormal; Notable for the following components:    NT-proBNP 389 (*)     All other components within normal limits   CREATINE KINASE - Abnormal; Notable for the following components:    CPK Total 1127 (*)     All other components within normal limits   MAGNESIUM   PHOSPHORUS   URINALYSIS,CULTURE IF INDICATED   URINALYSIS        RADIOLOGY  I have independently interpreted the diagnostic imaging associated with this visit and am waiting the final reading from the radiologist.   My preliminary interpretation is a follows: no intracranial hemorrhage  Radiologist interpretation:   DX-CHEST-PORTABLE (1 VIEW)   Final Result      1.  Persistently enlarged cardiac silhouette   2.  Interstitial pulmonary edema   3.  Bibasilar underinflation atelectasis      CT-CSPINE WITHOUT PLUS RECONS   Final Result      No acute abnormality identified.      CT-HEAD W/O   Final  Result      1.  No acute intracranial abnormality.   2.  Small areas of encephalomalacia in the BILATERAL cerebellar hemispheres   3.  Moderate area of encephalomalacia in the RIGHT MCA territory as described above   4.  Atrophy   5.  White matter changes                 COURSE & MEDICAL DECISION MAKING  1:47 PM - Patient seen and examined at charge desk as a code TBI. Discussed plan of care, including emergent imaging and additional diagnostic studies. Patient agrees to the plan of care. Ordered for EKG, CT-head without, CT-Cspine without plus recons, TSH with reflex to FT4, CBC with differential, CMP, UA to evaluate her symptoms.      ED Observation Status? Yes; I am placing the patient in to an observation status due to a diagnostic uncertainty as well as therapeutic intensity. Patient placed in observation status at 1:52 PM, 9/25/2023.     Observation plan is as follows: Monitor for symptom management and diagnostic results.   3:47 PM - Patient will be given NS infusion 1 L.     4:07 PM - Ordered DX-Chest for further evaluation.     4:37 PM Will brenner to medicate the patient with Lasix injection 40 mg and Synthroid tablet 175 mg prior to hospitalization. Patient's tetanus will also be boosted.     Upon Reevaluation, the patient's condition has: not improved; and will be escalated to hospitalization.    Patient discharged from ED Observation status at 5:00 PM, 9/25/2023.    INITIAL ASSESSMENT, COURSE AND PLAN  Care Narrative: Patient with history of hypothyroidism who presents after ground-level mechanical fall today with head trauma.  She is alert with normal vital signs on arrival however is somewhat confused.  She has no focal neurologic deficits on exam concerning for acute stroke.  Her CT scan shows areas of encephalomalacia possibly from prior undiagnosed stroke however no intracranial hemorrhage or skull fracture.  EKG appears unchanged without acute ischemia or arrhythmia.  Labs do show a mildly elevated  creatinine without any associated electrolyte abnormalities.  The patient has an elevated troponin and BNP as well as signs of pulmonary edema on chest x-ray which likely demonstrate a new diagnosis of heart failure.  Thyroid testing does show very low T4, patient has unlikely been compliant with medications as she cannot remember that she is supposed to be on thyroid medications.  She does not appear to have seen a doctor in a few years and paramedics were concerned for her living situation and failure to thrive.  She will be treated with her dose of levothyroxine and as well as given a dose of Lasix for heart failure and admitted for further work-up and monitoring.       4:30 PM - Upon reassessment, patient is resting comfortably with normal vital signs. Discussed results with patient and/or family. I informed the patient of my plan to admit today given the patient's current presentation and diagnostic study results. Patient verbalizes understanding and support with my plan for admission.        ADDITIONAL PROBLEM LIST  Problem #1: Acute closed head injury -imaging reassuring    Problem #2: Acute encephalopathy -unclear if related to recent head injury or more chronic and related to encephalomalacia on CT possibly from prior stroke or dementia    Problem #3: Hypothyroidism patient appears to be noncompliant with medications, restart levothyroxine -     Problem #4: Congestive heart failure -given Lasix for diuresis, needs echocardiogram    DISPOSITION AND DISCUSSIONS  I have discussed management of the patient with the following physicians and JJ's:      4:59 PM I discussed the patient's case and the above findings with Dr. Wolf (Hospitalist) who agrees to evaluate the patient for hospitalization.     CRITICAL CARE  The very real possibilty of a deterioration of this patient's condition required the highest level of my preparedness for sudden, emergent intervention.  I provided critical care services, which  included medication orders, frequent reevaluations of the patient's condition and response to treatment, ordering and reviewing test results, and discussing the case with various consultants.  The critical care time associated with the care of the patient was 34 minutes. Review chart for interventions. This time is exclusive of any other billable procedures.       DISPOSITION:  Patient will be hospitalized by Dr. Wolf in guarded condition.     FINAL DIAGNOSIS  1. Closed head injury, initial encounter    2. Acute encephalopathy    3. Hypothyroidism, unspecified type    4. Acute congestive heart failure, unspecified heart failure type (HCC)        The note accurately reflects work and decisions made by me.  Aurelia Rubin M.D.  9/25/2023  6:43 PM     Sebas WHATLEY (Scribe), am scribing for, and in the presence of, Aurelia Rubin M.D..    Electronically signed by: Sebas Calix (Daniel), 9/25/2023    Aurelia WHATLEY M.D. personally performed the services described in this documentation, as scribed by Sebas Calix in my presence, and it is both accurate and complete.

## 2023-09-25 NOTE — ED TRIAGE NOTES
"Chief Complaint   Patient presents with    T-5000 GLF     BIB EMS from home. Pt was found down outside of her house by [employer]. Pt clearly had a GLF with a head injury. Lac noted to back of head. Blood was noted to stair step into house.   Pt denies LOC. Denies blood thinners or ASA.   PD was called in regards to pt living conditions. House is disarray. Pt dog was taken by animal control. EPS was notified by Bart Chatterjee.   BS: 112.     Head Injury     SW notified. TBI activated on arrival.     /89   Pulse 79   Temp 36.4 °C (97.6 °F) (Temporal)   Resp 18   Ht 1.727 m (5' 8\")   Wt 72.6 kg (160 lb)   LMP 03/01/2005   SpO2 94%   BMI 24.33 kg/m²     "

## 2023-09-25 NOTE — ED NOTES
Pt placed on 2L O2 via NC for low SpO2 on room air, pt tolerating well. ED Tech also at bedside for EKG.    Bedside report given to MICHELLE Russ. Currently pt is resting in bed with even and unlabored breaths, in no apparent distress. Fall risk precautions in place, including bed alarm.This RN removed from care.

## 2023-09-25 NOTE — ED NOTES
Bedside report from MICHELLE Cai. Pt resting in [facility] comfortably, on monitor, call light in reach.  Pt placed on 2.5L nc traced to wall O2- sats are 91%, GCS 14.  Necessary fall precautions in place including bed alarm- plugged into wall.   Purewick placed on pt and warm blanket provided

## 2023-09-26 PROBLEM — N30.00 ACUTE CYSTITIS WITHOUT HEMATURIA: Status: ACTIVE | Noted: 2023-09-26

## 2023-09-26 LAB
ALBUMIN SERPL BCP-MCNC: 3.7 G/DL (ref 3.2–4.9)
ALBUMIN/GLOB SERPL: 2.1 G/DL
ALP SERPL-CCNC: 70 U/L (ref 30–99)
ALT SERPL-CCNC: 9 U/L (ref 2–50)
ANION GAP SERPL CALC-SCNC: 10 MMOL/L (ref 7–16)
APPEARANCE UR: CLEAR
AST SERPL-CCNC: 24 U/L (ref 12–45)
BACTERIA #/AREA URNS HPF: ABNORMAL /HPF
BILIRUB SERPL-MCNC: 0.7 MG/DL (ref 0.1–1.5)
BILIRUB UR QL STRIP.AUTO: NEGATIVE
BUN SERPL-MCNC: 22 MG/DL (ref 8–22)
CALCIUM ALBUM COR SERPL-MCNC: 9.2 MG/DL (ref 8.5–10.5)
CALCIUM SERPL-MCNC: 9 MG/DL (ref 8.5–10.5)
CHLORIDE SERPL-SCNC: 105 MMOL/L (ref 96–112)
CHOLEST SERPL-MCNC: 233 MG/DL (ref 100–199)
CO2 SERPL-SCNC: 26 MMOL/L (ref 20–33)
COLOR UR: YELLOW
CREAT SERPL-MCNC: 1.32 MG/DL (ref 0.5–1.4)
EPI CELLS #/AREA URNS HPF: NEGATIVE /HPF
ERYTHROCYTE [DISTWIDTH] IN BLOOD BY AUTOMATED COUNT: 51.8 FL (ref 35.9–50)
GFR SERPLBLD CREATININE-BSD FMLA CKD-EPI: 40 ML/MIN/1.73 M 2
GLOBULIN SER CALC-MCNC: 1.8 G/DL (ref 1.9–3.5)
GLUCOSE SERPL-MCNC: 101 MG/DL (ref 65–99)
GLUCOSE UR STRIP.AUTO-MCNC: NEGATIVE MG/DL
HCT VFR BLD AUTO: 40.5 % (ref 37–47)
HDLC SERPL-MCNC: 66 MG/DL
HGB BLD-MCNC: 13.3 G/DL (ref 12–16)
HYALINE CASTS #/AREA URNS LPF: ABNORMAL /LPF
KETONES UR STRIP.AUTO-MCNC: NEGATIVE MG/DL
LDLC SERPL CALC-MCNC: 150 MG/DL
LEUKOCYTE ESTERASE UR QL STRIP.AUTO: ABNORMAL
MCH RBC QN AUTO: 31.1 PG (ref 27–33)
MCHC RBC AUTO-ENTMCNC: 32.8 G/DL (ref 32.2–35.5)
MCV RBC AUTO: 94.6 FL (ref 81.4–97.8)
MICRO URNS: ABNORMAL
NITRITE UR QL STRIP.AUTO: POSITIVE
PH UR STRIP.AUTO: 5 [PH] (ref 5–8)
PLATELET # BLD AUTO: 180 K/UL (ref 164–446)
PMV BLD AUTO: 10.2 FL (ref 9–12.9)
POTASSIUM SERPL-SCNC: 3.8 MMOL/L (ref 3.6–5.5)
PROT SERPL-MCNC: 5.5 G/DL (ref 6–8.2)
PROT UR QL STRIP: NEGATIVE MG/DL
RBC # BLD AUTO: 4.28 M/UL (ref 4.2–5.4)
RBC # URNS HPF: ABNORMAL /HPF
RBC UR QL AUTO: ABNORMAL
SODIUM SERPL-SCNC: 141 MMOL/L (ref 135–145)
SP GR UR STRIP.AUTO: 1.01
TRIGL SERPL-MCNC: 86 MG/DL (ref 0–149)
UROBILINOGEN UR STRIP.AUTO-MCNC: 0.2 MG/DL
WBC # BLD AUTO: 5 K/UL (ref 4.8–10.8)
WBC #/AREA URNS HPF: ABNORMAL /HPF

## 2023-09-26 PROCEDURE — 700101 HCHG RX REV CODE 250: Performed by: INTERNAL MEDICINE

## 2023-09-26 PROCEDURE — 87086 URINE CULTURE/COLONY COUNT: CPT

## 2023-09-26 PROCEDURE — A9270 NON-COVERED ITEM OR SERVICE: HCPCS | Performed by: STUDENT IN AN ORGANIZED HEALTH CARE EDUCATION/TRAINING PROGRAM

## 2023-09-26 PROCEDURE — 80053 COMPREHEN METABOLIC PANEL: CPT

## 2023-09-26 PROCEDURE — 770006 HCHG ROOM/CARE - MED/SURG/GYN SEMI*

## 2023-09-26 PROCEDURE — 81001 URINALYSIS AUTO W/SCOPE: CPT

## 2023-09-26 PROCEDURE — 700111 HCHG RX REV CODE 636 W/ 250 OVERRIDE (IP): Performed by: INTERNAL MEDICINE

## 2023-09-26 PROCEDURE — 700102 HCHG RX REV CODE 250 W/ 637 OVERRIDE(OP): Performed by: STUDENT IN AN ORGANIZED HEALTH CARE EDUCATION/TRAINING PROGRAM

## 2023-09-26 PROCEDURE — 80061 LIPID PANEL: CPT

## 2023-09-26 PROCEDURE — 97162 PT EVAL MOD COMPLEX 30 MIN: CPT

## 2023-09-26 PROCEDURE — 87077 CULTURE AEROBIC IDENTIFY: CPT

## 2023-09-26 PROCEDURE — 99232 SBSQ HOSP IP/OBS MODERATE 35: CPT | Performed by: INTERNAL MEDICINE

## 2023-09-26 PROCEDURE — 87186 SC STD MICRODIL/AGAR DIL: CPT | Mod: 91

## 2023-09-26 PROCEDURE — 97166 OT EVAL MOD COMPLEX 45 MIN: CPT

## 2023-09-26 PROCEDURE — 36415 COLL VENOUS BLD VENIPUNCTURE: CPT

## 2023-09-26 PROCEDURE — 700111 HCHG RX REV CODE 636 W/ 250 OVERRIDE (IP): Mod: JZ | Performed by: STUDENT IN AN ORGANIZED HEALTH CARE EDUCATION/TRAINING PROGRAM

## 2023-09-26 PROCEDURE — 85027 COMPLETE CBC AUTOMATED: CPT

## 2023-09-26 RX ADMIN — HEPARIN SODIUM 5000 UNITS: 5000 INJECTION, SOLUTION INTRAVENOUS; SUBCUTANEOUS at 23:01

## 2023-09-26 RX ADMIN — FUROSEMIDE 40 MG: 10 INJECTION INTRAMUSCULAR; INTRAVENOUS at 05:07

## 2023-09-26 RX ADMIN — LEVOTHYROXINE SODIUM ANHYDROUS 50 MCG: 100 INJECTION, POWDER, LYOPHILIZED, FOR SOLUTION INTRAVENOUS at 14:33

## 2023-09-26 RX ADMIN — SENNOSIDES AND DOCUSATE SODIUM 2 TABLET: 50; 8.6 TABLET ORAL at 05:04

## 2023-09-26 RX ADMIN — HEPARIN SODIUM 5000 UNITS: 5000 INJECTION, SOLUTION INTRAVENOUS; SUBCUTANEOUS at 14:33

## 2023-09-26 RX ADMIN — SENNOSIDES AND DOCUSATE SODIUM 2 TABLET: 50; 8.6 TABLET ORAL at 17:37

## 2023-09-26 RX ADMIN — HEPARIN SODIUM 5000 UNITS: 5000 INJECTION, SOLUTION INTRAVENOUS; SUBCUTANEOUS at 05:05

## 2023-09-26 RX ADMIN — CEFTRIAXONE SODIUM 1000 MG: 10 INJECTION, POWDER, FOR SOLUTION INTRAVENOUS at 17:37

## 2023-09-26 ASSESSMENT — COGNITIVE AND FUNCTIONAL STATUS - GENERAL
HELP NEEDED FOR BATHING: A LITTLE
WALKING IN HOSPITAL ROOM: A LOT
DRESSING REGULAR LOWER BODY CLOTHING: A LITTLE
STANDING UP FROM CHAIR USING ARMS: A LITTLE
MOVING FROM LYING ON BACK TO SITTING ON SIDE OF FLAT BED: UNABLE
MOVING TO AND FROM BED TO CHAIR: A LOT
DAILY ACTIVITIY SCORE: 21
TOILETING: A LITTLE
SUGGESTED CMS G CODE MODIFIER MOBILITY: CL
SUGGESTED CMS G CODE MODIFIER DAILY ACTIVITY: CJ
TURNING FROM BACK TO SIDE WHILE IN FLAT BAD: A LITTLE
MOBILITY SCORE: 12
CLIMB 3 TO 5 STEPS WITH RAILING: TOTAL

## 2023-09-26 ASSESSMENT — ENCOUNTER SYMPTOMS
FEVER: 0
CHILLS: 0
NAUSEA: 0
ABDOMINAL PAIN: 0
DIZZINESS: 0
FALLS: 1
MEMORY LOSS: 1
VOMITING: 0
HEADACHES: 0
DEPRESSION: 0
SHORTNESS OF BREATH: 0

## 2023-09-26 ASSESSMENT — GAIT ASSESSMENTS
ASSISTIVE DEVICE: HAND HELD ASSIST
GAIT LEVEL OF ASSIST: MODERATE ASSIST
DISTANCE (FEET): 3
DEVIATION: SHUFFLED GAIT;BRADYKINETIC

## 2023-09-26 ASSESSMENT — FIBROSIS 4 INDEX: FIB4 SCORE: 3.69

## 2023-09-26 ASSESSMENT — PAIN DESCRIPTION - PAIN TYPE
TYPE: ACUTE PAIN

## 2023-09-26 ASSESSMENT — ACTIVITIES OF DAILY LIVING (ADL): TOILETING: INDEPENDENT

## 2023-09-26 NOTE — PROGRESS NOTES
Assumed care of pt  at 0700. Report received from Night RN. Pt is A&O x 2 oriented to person and place. Pt was toileted, cleaned up, and brought back to bed and made comfortable with the use of pillows. Patient stated that their pain is 0/10. Pt's pain comfort goal is 0/10. Pt educated on how to use the call light, pt verbalized understanding. Bed in lowest locked position, call light within reach, hourly rounding in place. Labs reviewed, orders reviewed, communication board updated. Pt declines any further needs at this time.

## 2023-09-26 NOTE — ASSESSMENT & PLAN NOTE
Patient presented status post fall  CT head negative for any intracranial hemorrhage  PT/OT- post acute

## 2023-09-26 NOTE — THERAPY
"Physical Therapy   Initial Evaluation     Patient Name: Gaye Antoine  Age:  83 y.o., Sex:  female  Medical Record #: [id number]  Today's Date: 9/26/2023     Precautions  Precautions: Fall Risk    Assessment  Patient is 83 y.o. female admitted post GLF with head injury. Pt diagnosed with acute respiratory failure with hypoxia, MYRA, and encephalomalacia. Unclear baseline cognition per chart. Pt reports living alone and being independent prior. She required mod assist to ambulate 3ft with HHA before refusing to ambulate further. Attempted to explain to pt recommendations for post acute placement, but pt did not appear to comprehend. PT will cont while pt is in acute care setting to address deficits.     Plan    Physical Therapy Initial Treatment Plan   Treatment Plan : Gait Training, Neuro Re-Education / Balance, Self Care / Home Evaluation, Stair Training, Therapeutic Activities, Therapeutic Exercise  Treatment Frequency: 3 Times per Week  Duration: Until Therapy Goals Met    DC Equipment Recommendations: Unable to determine at this time  Discharge Recommendations: Recommend post-acute placement for additional physical therapy services prior to discharge home       Subjective    \"Can I go home tomorrow?\"       09/26/23 1540   Pain 0 - 10 Group   Therapist Pain Assessment During Activity;0   Prior Living Situation   Prior Services [employer]   Housing / Facility 2 Story House   Steps Into Home 1   Steps In Home 14   Equipment Owned None   Lives with - Patient's Self Care Capacity Alone and Unable to Care For Self   Comments unclear if pt is an accurate historian. Reports bed/bath are upstairs   Prior Level of Functional Mobility   Bed Mobility Independent   Transfer Status Independent   Ambulation Independent   Ambulation Distance community   Assistive Devices Used None   Stairs Independent   Comments independent prior   History of Falls   History of Falls Yes   Cognition    Cognition / Consciousness X   Level " of Consciousness Alert   Ability To Follow Commands 1 Step   Safety Awareness Impaired   New Learning Impaired   Comments per chart dementia at baseline, appears to not understand POC   Active ROM Lower Body    Active ROM Lower Body  WDL   Strength Lower Body   Lower Body Strength  X   Gross Strength Generalized Weakness, Equal Bilaterally   Sensation Lower Body   Lower Extremity Sensation   WDL   Other Treatments   Other Treatments Provided limited volition   Balance Assessment   Sitting Balance (Static) Fair   Sitting Balance (Dynamic) Fair   Standing Balance (Static) Poor +   Standing Balance (Dynamic) Poor   Weight Shift Sitting Fair   Weight Shift Standing Fair   Comments HHA in standing   Bed Mobility    Supine to Sit Contact Guard Assist   Sit to Supine Supervised   Scooting Supervised   Comments HOB raised   Gait Analysis   Gait Level Of Assist Moderate Assist   Assistive Device Hand Held Assist   Distance (Feet) 3   # of Times Distance was Traveled 1   Deviation Shuffled Gait;Bradykinetic   Weight Bearing Status no restrictions   Comments shuffled gait, kyphotic posture, refused further gait   Functional Mobility   Sit to Stand Minimal Assist   Transfer Method Stand Step   Mobility in room with HHA   Edema / Skin Assessment   Edema / Skin  Not Assessed   Patient / Family Goals    Patient / Family Goal #1 get home   Short Term Goals    Short Term Goal # 1 pt will be able to complete functional transfers with LRAD and SPV in 6tx in order to dc home   Short Term Goal # 2 pt will be able to ambulate 50ft with LRAD and SPV in 6tx in order to dc home   Short Term Goal # 3 pt will be able to negotiate 6 steps with SPV in 6tx in order to dc home   Education Group   Education Provided Role of Physical Therapist   Role of Physical Therapist Patient Response Patient;Acceptance;Explanation;Action Demonstration;Reinforcement Needed   Anticipated Discharge Equipment and Recommendations   DC Equipment Recommendations  Unable to determine at this time   Discharge Recommendations Recommend post-acute placement for additional physical therapy services prior to discharge home

## 2023-09-26 NOTE — PROGRESS NOTES
[facility] Medicine Daily Progress Note    Date of Service  9/26/2023    Chief Complaint  Gaye Antoine is a 83 y.o. female admitted 9/25/2023 with ground level fall.     Hospital Course  83 y.o. female with hypothyroidism who presented 9/25/2023 with ground-level fall.  Patient is a poor historian, so history is obtained from ER physician and chart review.  Patient had been found down by [employer] outside of her house.  When asked the patient, she states that she went outside to get her meal, and she suddenly fell backwards.  She is unable to explain to me why or how she fell.  She clearly struck her head when falling, as there is a small laceration back of her head.  CT head was negative for any intracranial hemorrhage.  However there is encephalomalacia.  On further work-up, her chest x-ray revealed some interstitial pulmonary edema.  Her TSH levels were 249.  She was also found to have an elevated creatinine at 1.59.  It is unclear when the last time the patient was seen by physician.  Patient will be admitted for failure to thrive, inability to care for self, management of her MYRA and pulmonary edema.    Interval Problem Update   and T4 undetectable, will start IV levothyroxine.  Creatinine improved 1.32.  Echocardiogram pending.  Vital stable on room air this morning.  Patient oriented x3 but does not know the events of what brought her to the hospital she can only state that she fell. Patient reports that she does not have a primary care physician even though she does have one documented in our system.  She reports that she has not seen them in many years.  She denies any previous history of hypothyroidism however on review of her records she has post ablative hypothyroidism and was supposed to be on Synthroid 175 mcg daily, last note from 2021.  Patient noted to have dark foul-smelling urine and is complaining of increased urinary frequency, UA on admission was positive start 3 days of  Rocephin.  PT/OT recommending postacute placement.  Patient already requesting to go home, I explained to her at length the importance of the IV thyroid replacement.    I have discussed this patient's plan of care and discharge plan at IDT rounds today with Case Management, Nursing, Nursing leadership, and other members of the IDT team.    Consultants/Specialty  N/A    Code Status  Full Code    Disposition  The patient is not medically cleared for discharge to home or a post-acute facility.  Anticipate discharge to: skilled nursing facility    I have placed the appropriate orders for post-discharge needs.    Review of Systems  Review of Systems   Constitutional:  Positive for malaise/fatigue. Negative for chills and fever.   Respiratory:  Negative for shortness of breath.    Cardiovascular:  Negative for chest pain.   Gastrointestinal:  Negative for abdominal pain, nausea and vomiting.   Musculoskeletal:  Positive for falls.   Skin:  Negative for rash.   Neurological:  Negative for dizziness and headaches.   Psychiatric/Behavioral:  Positive for memory loss. Negative for depression.    All other systems reviewed and are negative.       Physical Exam  Temp:  [36.1 °C (96.9 °F)-36.4 °C (97.6 °F)] 36.1 °C (96.9 °F)  Pulse:  [61-83] 71  Resp:  [16-19] 16  BP: (108-123)/(74-91) 120/89  SpO2:  [83 %-99 %] 91 %    Physical Exam  Vitals and nursing note reviewed.   Constitutional:       General: She is not in acute distress.  HENT:      Head: Normocephalic and atraumatic.      Mouth/Throat:      Pharynx: Oropharynx is clear.   Eyes:      Conjunctiva/sclera: Conjunctivae normal.   Cardiovascular:      Rate and Rhythm: Normal rate and regular rhythm.      Pulses: Normal pulses.      Heart sounds: Normal heart sounds.   Pulmonary:      Effort: Pulmonary effort is normal. No respiratory distress.      Breath sounds: Normal breath sounds.   Abdominal:      General: Abdomen is flat. There is no distension.      Palpations:  Abdomen is soft.      Tenderness: There is no abdominal tenderness.   Musculoskeletal:         General: Normal range of motion.      Cervical back: Normal range of motion and neck supple.      Right lower leg: No edema.      Left lower leg: No edema.   Skin:     General: Skin is warm and dry.   Neurological:      General: No focal deficit present.      Mental Status: She is alert.      Cranial Nerves: No cranial nerve deficit.      Motor: Weakness present.      Comments: A&Ox3, not to situation    Psychiatric:         Behavior: Behavior normal.         Fluids  No intake or output data in the 24 hours ending 09/26/23 1629    Laboratory  Recent Labs     09/25/23  1414 09/26/23  0127   WBC 6.4 5.0   RBC 4.90 4.28   HEMOGLOBIN 15.0 13.3   HEMATOCRIT 45.9 40.5   MCV 93.7 94.6   MCH 30.6 31.1   MCHC 32.7 32.8   RDW 52.4* 51.8*   PLATELETCT 204 180   MPV 10.4 10.2     Recent Labs     09/25/23  1414 09/26/23  0127   SODIUM 143 141   POTASSIUM 3.5* 3.8   CHLORIDE 104 105   CO2 24 26   GLUCOSE 100* 101*   BUN 21 22   CREATININE 1.59* 1.32   CALCIUM 9.3 9.0             Recent Labs     09/26/23  0127   TRIGLYCERIDE 86   HDL 66   *       Imaging  DX-CHEST-PORTABLE (1 VIEW)   Final Result      1.  Persistently enlarged cardiac silhouette   2.  Interstitial pulmonary edema   3.  Bibasilar underinflation atelectasis      CT-CSPINE WITHOUT PLUS RECONS   Final Result      No acute abnormality identified.      CT-HEAD W/O   Final Result      1.  No acute intracranial abnormality.   2.  Small areas of encephalomalacia in the BILATERAL cerebellar hemispheres   3.  Moderate area of encephalomalacia in the RIGHT MCA territory as described above   4.  Atrophy   5.  White matter changes            EC-ECHOCARDIOGRAM COMPLETE W/O CONT    (Results Pending)        Assessment/Plan  * Acute respiratory failure with hypoxia (HCC)- (present on admission)  Assessment & Plan  Normally does not use oxygen at baseline  Currently requiring  between 2-3L   Secondary to pulmonary edema  S/p IV lasix   Day team to reassess given kidney function     Resolved, on room air     Acute cystitis without hematuria- (present on admission)  Assessment & Plan  Patient reporting urinary frequency  UA positive, urine culture pending  Rocephin x3 days    Dementia (HCC)  Assessment & Plan  Dementia vs altered mental status  Unclear what patients baseline is  She is oriented to place and self    Acute kidney injury (HCC)  Assessment & Plan  Last creatinine was several years ago with a creatinine level 0.66.  Unclear with the patient's new baseline is.  However patient did present with a creatinine of 1.59.  Possibly pre-renal  Patient received bolus in the ED  Follow-up urinalysis    Fall  Assessment & Plan  Patient presented status post fall  CT head negative for any intracranial hemorrhage  PT/OT- post acute     Hypokalemia  Assessment & Plan  Replete with IV or PO for goal > 4.0     Pulmonary edema  Assessment & Plan  Interstitial pulmonary edema found on chest x-ray  Initially requiring 2L supplemental oxygen in the ED, but currently saturating appropriately on room air   Echo pending     Now on room air    Encephalomalacia  Assessment & Plan  Seen on CT head on admission.  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  She is prescribed Synthroid at home, however she has not been taking it.  Her  patient is 249, and T4 levels are undetectable.     Patient still confused, not a good historian   IV levothyroxine ordered x3 days   Will need a PCP to follow up repeat TSH/T4 after discharge          VTE prophylaxis: heparin ppx     I have performed a physical exam and reviewed and updated ROS and Plan today (9/26/2023). In review of yesterday's note (9/25/2023), there are no changes except as documented above.

## 2023-09-26 NOTE — H&P
Hospital Medicine History & Physical Note    Date of Service  9/25/2023    Primary Care Physician  Kvng Browning M.D.        Code Status  Full Code    Chief Complaint  Chief Complaint   Patient presents with    T-5000 GLF     BIB EMS from home. Pt was found down outside of her house by [employer]. Pt clearly had a GLF with a head injury. Lac noted to back of head. Blood was noted to stair step into house.   Pt denies LOC. Denies blood thinners or ASA.   PD was called in regards to pt living conditions. House is disarray. Pt dog was taken by animal "[employer]. EPS was notified by [employer].   BS: 112.     Head Injury       History of Presenting Illness  Gaye Antoine is a 83 y.o. female with hypothyroidism who presented 9/25/2023 with ground-level fall.  Patient is a poor historian, so history is obtained from ER physician and chart review.  Patient had been found down by [employer] outside of her house.  When asked the patient, she states that she went outside to get her meal, and she suddenly fell backwards.  She is unable to explain to me why or how she fell.  She clearly struck her head when falling, as there is a small laceration back of her head.  CT head was negative for any intracranial hemorrhage.  However there is encephalomalacia.  On further work-up, her chest x-ray revealed some interstitial pulmonary edema.  Her TSH levels were 249.  She was also found to have an elevated creatinine at 1.59.  It is unclear when the last time the patient was seen by physician.  Patient will be admitted for failure to thrive, inability to care for self, management of her MYRA and pulmonary edema.    I discussed the plan of care with patient.    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    Surgical History   has a past surgical history that includes ankle fusion (1/8/2009); hip cannulated screw (6/15/2009); other (2008); appendectomy (age 7); cholecystectomy; parathyroid exploration (2/22/2012); rhytidectomy; and mammoplasty reduction (2006).     Family History  family history includes Cancer in her mother; Genitourinary () Problems in her sister; Heart Disease in her father; Lung Disease in her mother.   Family history reviewed with patient. There is no family history that is pertinent to the chief complaint.     Social History   reports that she has been smoking cigarettes. She has a 25.0 pack-year smoking history. She has never used smokeless tobacco. She reports current alcohol use of about 1.2 oz of alcohol per week. She reports that she does not use drugs.    Allergies  Allergies   Allergen Reactions    Sulfa Drugs      Not sure       Medications  None       Physical Exam  Temp:  [36.4 °C (97.6 °F)] 36.4 °C (97.6 °F)  Pulse:  [76-82] 82  Resp:  [14-18] 14  BP: (109-121)/(82-93) 109/82  SpO2:  [83 %-99 %] 83 %  Blood Pressure : (!) 121/93   Temperature: 36.4 °C (97.6 °F)   Pulse: 76   Respiration: 14   Pulse Oximetry: 99 %       Physical Exam  Constitutional:       General: She is not in acute distress.     Appearance: Normal appearance. She is normal weight. She is not ill-appearing, toxic-appearing or diaphoretic.   HENT:      Head: Normocephalic and atraumatic.      Nose: Nose normal.      Mouth/Throat:      Mouth: Mucous membranes are moist.   Eyes:      Extraocular Movements: Extraocular movements intact.      Pupils:  Pupils are equal, round, and reactive to light.   Cardiovascular:      Rate and Rhythm: Normal rate and regular rhythm.      Pulses: Normal pulses.      Heart sounds: Normal heart sounds. No murmur heard.     No friction rub. No gallop.   Pulmonary:      Effort: Pulmonary effort is normal. No respiratory distress.      Breath sounds: No stridor. No wheezing, rhonchi or rales.   Chest:      Chest wall: No tenderness.   Abdominal:      General: Abdomen is flat. There is no distension.      Palpations: Abdomen is soft. There is no mass.      Tenderness: There is no abdominal tenderness. There is no guarding or rebound.      Hernia: No hernia is present.   Musculoskeletal:         General: No swelling, tenderness, deformity or signs of injury.      Right lower leg: No edema.      Left lower leg: No edema.      Comments:      Skin:     General: Skin is warm and dry.      Capillary Refill: Capillary refill takes less than 2 seconds.      Coloration: Skin is not jaundiced or pale.      Findings: No bruising, erythema, lesion or rash.   Neurological:      Mental Status: She is alert. Mental status is at baseline. She is disoriented.      Cranial Nerves: No cranial nerve deficit.      Sensory: No sensory deficit.      Motor: No weakness.      Coordination: Coordination normal.   Psychiatric:         Mood and Affect: Mood normal.         Behavior: Behavior normal.         Laboratory:  Recent Labs     09/25/23  1414   WBC 6.4   RBC 4.90   HEMOGLOBIN 15.0   HEMATOCRIT 45.9   MCV 93.7   MCH 30.6   MCHC 32.7   RDW 52.4*   PLATELETCT 204   MPV 10.4     Recent Labs     09/25/23  1414   SODIUM 143   POTASSIUM 3.5*   CHLORIDE 104   CO2 24   GLUCOSE 100*   BUN 21   CREATININE 1.59*   CALCIUM 9.3     Recent Labs     09/25/23  1414   ALTSGPT 12   ASTSGOT 29   ALKPHOSPHAT 78   TBILIRUBIN 0.9   GLUCOSE 100*         Recent Labs     09/25/23  1414   NTPROBNP 389*         Recent Labs     09/25/23  1414   TROPONINT 69*        Imaging:  DX-CHEST-PORTABLE (1 VIEW)   Final Result      1.  Persistently enlarged cardiac silhouette   2.  Interstitial pulmonary edema   3.  Bibasilar underinflation atelectasis      CT-CSPINE WITHOUT PLUS RECONS   Final Result      No acute abnormality identified.      CT-HEAD W/O   Final Result      1.  No acute intracranial abnormality.   2.  Small areas of encephalomalacia in the BILATERAL cerebellar hemispheres   3.  Moderate area of encephalomalacia in the RIGHT MCA territory as described above   4.  Atrophy   5.  White matter changes                X-Ray:  I have personally reviewed the images and compared with prior images.  EKG:  I have personally reviewed the images and compared with prior images.    Assessment/Plan:  Justification for Admission Status  I anticipate this patient will require at least two midnights for appropriate medical management, necessitating inpatient admission because of MYRA requiring IV fluids, with monitoring,  ground-level fall requiring PT OT evaluation    Patient will need a Med/Surg bed on EMERGENCY service .  The need is secondary to failure to thrive, MYRA, hypokalemia..    * Acute respiratory failure with hypoxia (HCC)- (present on admission)  Assessment & Plan  Normally does not use oxygen at baseline  Currently requiring between 2-3L   Secondary to pulmonary edema  Given lasix 40mg IV one time dose  Day team to reassess given kidney function     Fall  Assessment & Plan  Patient presented status post fall  CT head negative for any intracranial hemorrhage  Patient will need PT OT evaluation  Follow up CPK    Acute kidney injury (HCC)  Assessment & Plan  Last creatinine was several years ago with a creatinine level 0.66.  Unclear with the patient's new baseline is.  However patient did present with a creatinine of 1.59.  Possibly pre-renal  Patient received bolus in the ED  Follow-up urinalysis    Pulmonary edema  Assessment & Plan  Interstitial pulmonary edema found  on chest x-ray  Initially requiring 2L supplemental oxygen in the ED, but currently saturating appropriately on room air   Holding off on lasix given her YMRA  unclear if patient has heart failure.  Follow-up echocardiogram          Encephalomalacia  Assessment & Plan  Seen on CT head on admission.  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  She is prescribed Synthroid at home, however she has not been taking it.  Her  patient is 249, and T4 levels are undetectable.  Given a dose of Synthroid in the emergency department.  Resume home dose  Patient needs to establish with primary care physician    Dementia (HCC)  Assessment & Plan  Dementia vs altered mental status  Unclear what patients baseline is  She is oriented to place and self    Hypokalemia  Assessment & Plan  Replete with IV or PO for goal > 4.0         VTE prophylaxis: heparin ppx

## 2023-09-26 NOTE — ASSESSMENT & PLAN NOTE
Dementia vs altered mental status  Unclear what patients baseline is  She is oriented to place and self

## 2023-09-26 NOTE — THERAPY
Occupational Therapy   Initial Evaluation     Patient Name: Gaye Antoine  Age:  83 y.o., Sex:  female  Medical Record #: [id number]  Today's Date: 9/26/2023          Assessment  Patient is 83 y.o. female with a diagnosis of GLF hit head.  Pt currently limited by decreased functional mobility, activity tolerance, cognition, balance, which are affecting pt's ability to complete ADLs/IADLs at baseline. Pt would benefit from OT services in the acute care setting to maximize functional recovery.      Plan    Occupational Therapy Initial Treatment Plan   Treatment Interventions: (P) Self Care / Activities of Daily Living, Therapeutic Activity  Treatment Frequency: (P) 3 Times per Week  Duration: (P) Until Therapy Goals Met       Discharge Recommendations: (P) Recommend post-acute placement for additional occupational therapy services prior to discharge home        09/26/23 0732   Prior Living Situation   Housing / Facility 2 Story House   Steps In Home 13   Equipment Owned None   Lives with - Patient's Self Care Capacity Other (Comments)  (states she has a renter)   Comments not sure of pts ability to give accurate PLOF   Prior Level of ADL Function   Self Feeding Independent   Grooming / Hygiene Independent   Bathing Independent   Dressing Independent   Toileting Independent   ADL Assessment   Grooming Supervision   Upper Body Dressing Supervision   Lower Body Dressing Minimal Assist   Toileting Minimal Assist   Functional Mobility   Sit to Stand Contact Guard Assist   Bed, Chair, Wheelchair Transfer Contact Guard Assist   Short Term Goals   Short Term Goal # 1 supervised with LB dressing   Short Term Goal # 2 supervised with ADL txfs   Occupational Therapy Initial Treatment Plan    Treatment Interventions Self Care / Activities of Daily Living;Therapeutic Activity   Treatment Frequency 3 Times per Week   Duration Until Therapy Goals Met   Anticipated Discharge Equipment and Recommendations   Discharge Recommendations  Recommend post-acute placement for additional occupational therapy services prior to discharge home

## 2023-09-26 NOTE — CARE PLAN
The patient is Stable - Low risk of patient condition declining or worsening    Shift Goals  Clinical Goals: Monitor hemodynamics, UA  Patient Goals: rest  Family Goals: [place]    Progress made toward(s) clinical / shift goals:      Problem: Knowledge Deficit - Standard  Goal: Patient and family/care givers will demonstrate understanding of plan of care, disease process/condition, diagnostic tests and medications  Outcome: Progressing     Problem: Fall Risk  Goal: Patient will remain free from falls  Outcome: Progressing  Pt has remained free of falls and has called and communicated needs, such as toileting, appropriately. Pt has verbalized understanding of fall precautions in addition to fall risk. Hourly rounding has been in place throughout shift.     Patient is not progressing towards the following goals:

## 2023-09-26 NOTE — ASSESSMENT & PLAN NOTE
Interstitial pulmonary edema found on chest x-ray  Initially requiring 2L supplemental oxygen in the ED, but currently saturating appropriately on room air   Echo pending     Now on room air

## 2023-09-26 NOTE — ASSESSMENT & PLAN NOTE
Last creatinine was several years ago with a creatinine level 0.66.  Unclear with the patient's new baseline is.  However patient did present with a creatinine of 1.59.  Possibly pre-renal  Patient received bolus in the ED  Follow-up urinalysis

## 2023-09-26 NOTE — ASSESSMENT & PLAN NOTE
Normally does not use oxygen at baseline  Currently requiring between 2-3L   Secondary to pulmonary edema  S/p IV lasix   Day team to reassess given kidney function     Resolved, on room air

## 2023-09-26 NOTE — PROGRESS NOTES
Assumed pt care with RN. Pt was transferred to unit via gurney on 3L of oxygen via nasal canula. Patient walked to the bed with a standby assist (shuffle and steady). Pt is A&OX2 (disoriented to time and situation) and states she is not in any pain and declines interventions at this time. Plan of care discussed, no further questions at this time. 2RN skin check completed. Admit profile could not be completed due to the patient's confusion in conversation. Personal belongings and call light within reach, bed alarm on.

## 2023-09-26 NOTE — ED NOTES
Pt medicated per MAR, Trial on RA- 91%  Cleaned wound to posterior head, abrasion noted, no lacerations

## 2023-09-26 NOTE — ED NOTES
Report to MICHELLE Eptsein  Pt transported to S527-01 with RN. Pt GCS 14, 3L nc- transported on Oxygen. All belongings are with pt

## 2023-09-26 NOTE — PROGRESS NOTES
4 Eyes Skin Assessment Completed by MICHELLE Epstein and MICHELLE Morgan.    Head posterior abrasion  Ears WDL  Nose WDL  Mouth WDL  Neck WDL  Breast/Chest WDL  Shoulder Blades WDL  Spine WDL  (R) Arm/Elbow/Hand WDL  (L) Arm/Elbow/Hand WDL  Abdomen WDL  Groin WDL  Scrotum/Coccyx/Buttocks WDL  (R) Leg WDL  (L) Leg WDL  (R) Heel/Foot/Toe Redness, Blanching, and Swelling, calloused, cracking  (L) Heel/Foot/Toe Redness, Blanching, and Swelling, calloused, cracking          Devices In Places Nasal Cannula      Interventions In Place NC W/Ear Foams and Pillows    Possible Skin Injury No    Pictures Uploaded Into Epic N/A  Wound Consult Placed N/A  RN Wound Prevention Protocol Ordered No

## 2023-09-26 NOTE — ED NOTES
Pt provided food and water. Resting in the [place] with call light in reach and on monitor. O2 at 3L nc

## 2023-09-26 NOTE — DISCHARGE PLANNING
Case Management Discharge Planning    Admission Date: 9/25/2023  GMLOS: 4.3  ALOS: 1    6-Clicks ADL Score: 21  6-Clicks Mobility Score: 17  PT and/or OT Eval ordered: Yes  Post-acute Referrals Ordered: No  Post-acute Choice Obtained: No  Has referral(s) been sent to post-acute provider:  No  Pending  PT OT eval      Anticipated Discharge Dispo: Discharge Disposition: D/T to home under HHA care in anticipation of covered skilled care (06)    DME Needed: No    Action(s) Taken: DC Assessment Complete (See below)    Escalations Completed: None    Medically Clear: No    Next Steps: f/u with pt and medical team to discuss dc needs and barriers.       Barriers to Discharge: Medical clearance    Is the patient up for discharge tomorrow: No        Care Transition Team Assessment      RN CM met with pt at bedside to complete assessment. Pt A&Ox3 and able to verify the information on the face sheet. Per Pt she lives alone with her dog in a one story house. On Baseline, Pt was independent  in all her ADL's and IADL's. Pt has not had previous HH nor does she use DME or home O2.  Pt uses meals on wheels. She has a sister listed but phone number is not in service. Per Pt she has a son who passed away last year.  RN SIMON requested [facility] worker to see Pt.       Information Source  Orientation Level: Oriented to place, Oriented to time, Oriented to person  Information Given By: Patient  Who is responsible for making decisions for patient? : Patient    Readmission Evaluation  Is this a readmission?: No    Elopement Risk  Legal Hold: No  Ambulatory or Self Mobile in Wheelchair: Yes  Disoriented: No  Psychiatric Symptoms: None  History of Wandering: No  Elopement this Admit: No  Vocalizing Wanting to Leave: No  Displays Behaviors, Body Language Wanting to Leave: No-Not at Risk for Elopement  Elopement Risk: Not at Risk for Elopement    Interdisciplinary Discharge Planning  Lives with - Patient's Self Care Capacity:  Other (Comments) (states she has a renter)  Housing / Facility: 2 Story House    Discharge Preparedness  What is your plan after discharge?: Home health care  What are your discharge supports?: Sibling  Prior Functional Level: Ambulatory, Independent with Activities of Daily Living, Independent with Medication Management  Difficulity with ADLs: None    Functional Assesment  Prior Functional Level: Ambulatory, Independent with Activities of Daily Living, Independent with Medication Management    Finances  Financial Barriers to Discharge: No  Prescription Coverage: Yes              Advance Directive  Advance Directive?: None    Domestic Abuse  Have you ever been the victim of abuse or violence?: No    Psychological Assessment  History of Substance Abuse: None    Discharge Risks or Barriers  Discharge risks or barriers?: Lives alone, no community support  Patient risk factors: Vulnerable adult    Anticipated Discharge Information  Discharge Disposition: D/T to home under A care in anticipation of covered skilled care (06)

## 2023-09-26 NOTE — ASSESSMENT & PLAN NOTE
Patient with history of hypothyroidism.  She is prescribed Synthroid at home, however she has not been taking it.  Her  patient is 249, and T4 levels are undetectable.     Patient still confused, not a good historian   IV levothyroxine ordered x3 days   Will need a PCP to follow up repeat TSH/T4 after discharge

## 2023-09-27 ENCOUNTER — APPOINTMENT (OUTPATIENT)
Dept: CARDIOLOGY | Facility: MEDICAL CENTER | Age: 83
DRG: 682 | End: 2023-09-27
Attending: INTERNAL MEDICINE
Payer: MEDICARE

## 2023-09-27 ENCOUNTER — HOSPITAL ENCOUNTER (INPATIENT)
Facility: REHABILITATION | Age: 83
End: 2023-09-27
Attending: PHYSICAL MEDICINE & REHABILITATION | Admitting: PHYSICAL MEDICINE & REHABILITATION
Payer: MEDICARE

## 2023-09-27 LAB
ANION GAP SERPL CALC-SCNC: 11 MMOL/L (ref 7–16)
BUN SERPL-MCNC: 23 MG/DL (ref 8–22)
CALCIUM SERPL-MCNC: 9.4 MG/DL (ref 8.5–10.5)
CHLORIDE SERPL-SCNC: 97 MMOL/L (ref 96–112)
CO2 SERPL-SCNC: 28 MMOL/L (ref 20–33)
CREAT SERPL-MCNC: 1.34 MG/DL (ref 0.5–1.4)
ERYTHROCYTE [DISTWIDTH] IN BLOOD BY AUTOMATED COUNT: 51 FL (ref 35.9–50)
GFR SERPLBLD CREATININE-BSD FMLA CKD-EPI: 39 ML/MIN/1.73 M 2
GLUCOSE SERPL-MCNC: 81 MG/DL (ref 65–99)
HCT VFR BLD AUTO: 39.7 % (ref 37–47)
HGB BLD-MCNC: 13.3 G/DL (ref 12–16)
LV EJECT FRACT  99904: 65
MCH RBC QN AUTO: 31.6 PG (ref 27–33)
MCHC RBC AUTO-ENTMCNC: 33.5 G/DL (ref 32.2–35.5)
MCV RBC AUTO: 94.3 FL (ref 81.4–97.8)
PLATELET # BLD AUTO: 163 K/UL (ref 164–446)
PMV BLD AUTO: 10.3 FL (ref 9–12.9)
POTASSIUM SERPL-SCNC: 3.8 MMOL/L (ref 3.6–5.5)
RBC # BLD AUTO: 4.21 M/UL (ref 4.2–5.4)
SODIUM SERPL-SCNC: 136 MMOL/L (ref 135–145)
WBC # BLD AUTO: 5 K/UL (ref 4.8–10.8)

## 2023-09-27 PROCEDURE — 93308 TTE F-UP OR LMTD: CPT | Mod: 26 | Performed by: INTERNAL MEDICINE

## 2023-09-27 PROCEDURE — 770006 HCHG ROOM/CARE - MED/SURG/GYN SEMI*

## 2023-09-27 PROCEDURE — 700101 HCHG RX REV CODE 250: Performed by: INTERNAL MEDICINE

## 2023-09-27 PROCEDURE — 700111 HCHG RX REV CODE 636 W/ 250 OVERRIDE (IP): Performed by: INTERNAL MEDICINE

## 2023-09-27 PROCEDURE — 700111 HCHG RX REV CODE 636 W/ 250 OVERRIDE (IP): Performed by: STUDENT IN AN ORGANIZED HEALTH CARE EDUCATION/TRAINING PROGRAM

## 2023-09-27 PROCEDURE — 93321 DOPPLER ECHO F-UP/LMTD STD: CPT | Mod: 26 | Performed by: INTERNAL MEDICINE

## 2023-09-27 PROCEDURE — 93325 DOPPLER ECHO COLOR FLOW MAPG: CPT

## 2023-09-27 PROCEDURE — 93325 DOPPLER ECHO COLOR FLOW MAPG: CPT | Mod: 26 | Performed by: INTERNAL MEDICINE

## 2023-09-27 PROCEDURE — 99223 1ST HOSP IP/OBS HIGH 75: CPT | Performed by: PHYSICAL MEDICINE & REHABILITATION

## 2023-09-27 PROCEDURE — 99232 SBSQ HOSP IP/OBS MODERATE 35: CPT | Performed by: INTERNAL MEDICINE

## 2023-09-27 PROCEDURE — 80048 BASIC METABOLIC PNL TOTAL CA: CPT

## 2023-09-27 PROCEDURE — 36415 COLL VENOUS BLD VENIPUNCTURE: CPT

## 2023-09-27 PROCEDURE — 85027 COMPLETE CBC AUTOMATED: CPT

## 2023-09-27 PROCEDURE — A9270 NON-COVERED ITEM OR SERVICE: HCPCS | Performed by: STUDENT IN AN ORGANIZED HEALTH CARE EDUCATION/TRAINING PROGRAM

## 2023-09-27 PROCEDURE — 700102 HCHG RX REV CODE 250 W/ 637 OVERRIDE(OP): Performed by: STUDENT IN AN ORGANIZED HEALTH CARE EDUCATION/TRAINING PROGRAM

## 2023-09-27 RX ADMIN — FUROSEMIDE 40 MG: 10 INJECTION INTRAMUSCULAR; INTRAVENOUS at 05:13

## 2023-09-27 RX ADMIN — CEFTRIAXONE SODIUM 1000 MG: 10 INJECTION, POWDER, FOR SOLUTION INTRAVENOUS at 17:33

## 2023-09-27 RX ADMIN — HEPARIN SODIUM 5000 UNITS: 5000 INJECTION, SOLUTION INTRAVENOUS; SUBCUTANEOUS at 05:13

## 2023-09-27 RX ADMIN — SENNOSIDES AND DOCUSATE SODIUM 2 TABLET: 50; 8.6 TABLET ORAL at 05:13

## 2023-09-27 RX ADMIN — SENNOSIDES AND DOCUSATE SODIUM 2 TABLET: 50; 8.6 TABLET ORAL at 17:33

## 2023-09-27 RX ADMIN — LEVOTHYROXINE SODIUM ANHYDROUS 50 MCG: 100 INJECTION, POWDER, LYOPHILIZED, FOR SOLUTION INTRAVENOUS at 11:19

## 2023-09-27 RX ADMIN — HEPARIN SODIUM 5000 UNITS: 5000 INJECTION, SOLUTION INTRAVENOUS; SUBCUTANEOUS at 21:24

## 2023-09-27 RX ADMIN — HEPARIN SODIUM 5000 UNITS: 5000 INJECTION, SOLUTION INTRAVENOUS; SUBCUTANEOUS at 13:41

## 2023-09-27 ASSESSMENT — PAIN DESCRIPTION - PAIN TYPE
TYPE: ACUTE PAIN
TYPE: ACUTE PAIN

## 2023-09-27 ASSESSMENT — PATIENT HEALTH QUESTIONNAIRE - PHQ9
SUM OF ALL RESPONSES TO PHQ9 QUESTIONS 1 AND 2: 0
1. LITTLE INTEREST OR PLEASURE IN DOING THINGS: NOT AT ALL
2. FEELING DOWN, DEPRESSED, IRRITABLE, OR HOPELESS: NOT AT ALL

## 2023-09-27 ASSESSMENT — ENCOUNTER SYMPTOMS
HEADACHES: 0
SHORTNESS OF BREATH: 0
NAUSEA: 0
FALLS: 1
ABDOMINAL PAIN: 0
CHILLS: 0
DEPRESSION: 0
MEMORY LOSS: 1
DIZZINESS: 0
VOMITING: 0
FEVER: 0

## 2023-09-27 NOTE — DISCHARGE PLANNING
[facility] Transitional Care Coordination    Referral from: Dr London  Insurance Provider on Facesheet: Medicare  Potential Rehab Diagnosis: TBI    Chart review indicates patient may have on going medical management and may have therapy needs to possibly meet inpatient rehab facility criteria with the goal of returning to community.    D/C support: Lives alone - lacks dc support.     Physiatry consultation forwarded per protocol.     GLF w/ head injury - physiatry to consult, TCC will follow.     Thank you for the referral.

## 2023-09-27 NOTE — DISCHARGE PLANNING
Per physiatry patient is not a candidate for IPR d/t lack of dc support. TCC will no longer follow.

## 2023-09-27 NOTE — CARE PLAN
The patient is Stable - Low risk of patient condition declining or worsening    Shift Goals  Clinical Goals: safety, monitor labs  Patient Goals: DAKOTA  Family Goals: DAKOTA    Progress made toward(s) clinical / shift goals:  Patient was educated on fall risk precautions and verbalized understanding. Education was reinforced throughout the night due to patient's confusion. She was checked on hourly, effectively called for assistance, and fall precautions remained in place which allowed the patient to stay safe and free from falls.      Problem: Knowledge Deficit - Standard  Goal: Patient and family/care givers will demonstrate understanding of plan of care, disease process/condition, diagnostic tests and medications  Outcome: Progressing     Problem: Fall Risk  Goal: Patient will remain free from falls  Outcome: Progressing       Patient is not progressing towards the following goals:

## 2023-09-27 NOTE — CONSULTS
Physical Medicine and Rehabilitation Consultation              Date of initial consultation: 9/27/2023  Requesting provider: ordered by Zulay London D.O. at 09/27/23 1123   Consulting provider: Monserrat Jo D.O.  Reason for consultation: assess for acute inpatient rehab appropriateness  LOS: 2 Day(s)    Chief complaint: s/p GLF     HPI: The patient is a 83 y.o.  female with a past medical history of hypthyroidism;  who presented on 9/25/2023  1:51 PM with after a GLF. Where she fell and hit her head. Per documentation, patient fell while she was outside trying to get her meal from [employer] wheels. She feel backwards, hit her head. Did not have LOC. Upon eval in the ED. CT head was negative for any acute intracranial abnormalities. CXR showed pulmonary PEGGY. TSH level 249. Patient was admitted for failure to thrive. Patient was found to have acute cystitis on UA, patient is on rocephin x 3 days. Patient has been able to participate with therapies, she is Mod A for ambulation.     Patient seen and examined at bedside. Patient reports she does not remember the fall. Is unsure why she fell. Does not remember if she was lightheaded or tripped. Patient is mildly hypotensive, so may have been an orthostatic /syncopal episode. Echo is pending.   Does not report HA, lightheadedness, SOB, CP, abdominal pain, or changes in numbness/tingling/weakness. Does not want post acute rehab, but is unsafe to DC home alone. Education provided for SNF level rehab to improve safety at home, will need SNF due to lack of support of home .       Social Hx:  Patient lives alone kami   [facility] [place] [facility] has 1 [address]. 14 stairs inside   1 [address]  At prior level of function patient was Independent with mobility and ADLs.     Tobacco: Denies   Alcohol: Denies   Drugs: Denies     THERAPY:  Restrictions: Fall Risk   PT: Functional mobility   9/26 Mod A HHA x 3 , Min A sit to stand,     OT: ADLs  9/26 Min A lower body dressing     SLP:   None      IMAGING:  DX-CHEST-PORTABLE (1 VIEW)   Final Result       1.  Persistently enlarged cardiac silhouette   2.  Interstitial pulmonary edema   3.  Bibasilar underinflation atelectasis       CT-CSPINE WITHOUT PLUS RECONS   Final Result       No acute abnormality identified.       CT-HEAD W/O   Final Result       1.  No acute intracranial abnormality.   2.  Small areas of encephalomalacia in the BILATERAL cerebellar hemispheres   3.  Moderate area of encephalomalacia in the RIGHT MCA territory as described above   4.  Atrophy   5.  White matter changes       PROCEDURES:  None     PMH:  Past Medical History:   Diagnosis Date    Cholelithiasis 2010    Chronic constipation     Chronic depressive disorder 1/19/2013    Chronic use of steroids 1/19/2013    CVA (cerebral infarction) 2008    L hemiparesis / ? 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, late effect of cerebrovascular disease ([facility]) 1/19/2013    Renal cyst 2010    R kidney    Rheumatoid arthritis(714.0)     S/P parathyroidectomy ([facility]) 2012    single adenoma    Tobacco use 1/19/2013       PSH:  Past Surgical History:   Procedure Laterality Date    PARATHYROID EXPLORATION  2/22/2012    Performed by CURTIS DUPONT at SURGERY SAME DAY [facility] ORS    HIP CANNULATED SCREW  6/15/2009    Performed by DANIELLE LANG at SURGERY [facility] ORS    ANKLE FUSION  1/8/2009    Performed by WM GONSALO HERRERA at SURGERY SAME DAY [facility] ORS    OTHER  2008    submaxillary gland infection    MAMMOPLASTY REDUCTION  2006    complicated by abscess needing I&D    APPENDECTOMY  age 7    CHOLECYSTECTOMY      RHYTIDECTOMY         FHX:  Family History   Problem Relation Age of Onset    Cancer Mother         breast    Lung Disease Mother         TB    Heart Disease Father         aneurysm     "Genitourinary () Problems Sister         polycystic kidneys       Medications:  Current Facility-Administered Medications   Medication Dose    levothyroxine (Synthroid) injection 50 mcg  50 mcg    cefTRIAXone (Rocephin) syringe 1,000 mg  1,000 mg    tetanus-dipth-acell pertussis (Adacel) inj 0.5 mL  0.5 mL    senna-docusate (Pericolace Or Senokot S) 8.6-50 MG per tablet 2 Tablet  2 Tablet    And    polyethylene glycol/lytes (Miralax) PACKET 1 Packet  1 Packet    And    magnesium hydroxide (Milk Of Magnesia) suspension 30 mL  30 mL    And    bisacodyl (Dulcolax) suppository 10 mg  10 mg    heparin injection 5,000 Units  5,000 Units    acetaminophen (Tylenol) tablet 650 mg  650 mg    hydrALAZINE (Apresoline) injection 10 mg  10 mg    furosemide (Lasix) injection 40 mg  40 mg       Allergies:  Allergies   Allergen Reactions    Sulfa Drugs      Not sure       Physical Exam:  Vitals: BP 98/70   Pulse 66   Temp 36.3 °C (97.4 °F) (Temporal)   Resp 16   Ht 1.727 m (5' 8\")   Wt 74 kg (163 lb 2.3 oz)   SpO2 92%   Gen: NAD, laying comfortably in bed   Head:  NC/AT  Eyes/ Nose/ Mouth: PERRLA, moist mucous membranes  Cardio: RRR, good distal perfusion, warm extremities  Pulm: normal respiratory effort, no cyanosis   Abd: Soft NTND, negative borborygmi   Ext: No peripheral edema. No calf tenderness. No clubbing.    Mental status:  A&Ox4 (person, place,) Does not know the year, month, or why she is in the hospital. answers questions appropriately follows commands  Speech: fluent, no aphasia or dysarthria    CRANIAL NERVES:  2,3: visual acuity grossly intact, PERRL  3,4,6: EOMI bilaterally, no nystagmus or diplopia  5: sensation intact to light touch bilaterally and symmetric  7: no facial asymmetry  8: hearing grossly intact    Motor:      Upper Extremity  Myotome R L   Shoulder flexion C5 5/5 5/5   Elbow flexion C5 5/5 5/5   Wrist extension C6 5/5 5/5   Elbow extension C7 5/5 5/5   Finger flexion C8 5/5 5/5   Finger " abduction T1 5/5 5/5     Lower Extremity Myotome R L   Hip flexion L2 5/5 5/5   Knee extension L3 5/5 5/5   Ankle dorsiflexion L4 5/5 5/5   Toe extension L5 5/5 5/5   Ankle plantarflexion S1 5/5 5/5       Negative Pronator drift bilaterally    Sensory:   intact to light touch through out    DTRs: 2+ in bilateral  biceps  No clonus at bilateral ankles  Negative Mcmahon b/l     Tone: no spasticity noted, no cogwheeling noted    Coordination:   intact finger to nose bilaterally  intact fine motor with fingers bilaterally      Labs: Reviewed and significant for   Recent Labs     09/25/23  1414 09/26/23  0127 09/27/23  0444   RBC 4.90 4.28 4.21   HEMOGLOBIN 15.0 13.3 13.3   HEMATOCRIT 45.9 40.5 39.7   PLATELETCT 204 180 163*     Recent Labs     09/25/23  1414 09/26/23  0127 09/27/23  0444   SODIUM 143 141 136   POTASSIUM 3.5* 3.8 3.8   CHLORIDE 104 105 97   CO2 24 26 28   GLUCOSE 100* 101* 81   BUN 21 22 23*   CREATININE 1.59* 1.32 1.34   CALCIUM 9.3 9.0 9.4     Recent Results (from the past 24 hour(s))   URINALYSIS CULTURE, IF INDICATED    Collection Time: 09/26/23  2:00 PM    Specimen: Urine, Clean Catch   Result Value Ref Range    Color Yellow     Character Clear     Specific Gravity 1.009 <1.035    Ph 5.0 5.0 - 8.0    Glucose Negative Negative mg/dL    Ketones Negative Negative mg/dL    Protein Negative Negative mg/dL    Bilirubin Negative Negative    Urobilinogen, Urine 0.2 Negative    Nitrite Positive (A) Negative    Leukocyte Esterase Small (A) Negative    Occult Blood Trace (A) Negative    Micro Urine Req Microscopic    URINE MICROSCOPIC (W/UA)    Collection Time: 09/26/23  2:00 PM   Result Value Ref Range    WBC 10-20 (A) /hpf    RBC 2-5 (A) /hpf    Bacteria Many (A) None /hpf    Epithelial Cells Negative /hpf    Hyaline Cast 0-2 /lpf   CBC WITHOUT DIFFERENTIAL    Collection Time: 09/27/23  4:44 AM   Result Value Ref Range    WBC 5.0 4.8 - 10.8 K/uL    RBC 4.21 4.20 - 5.40 M/uL    Hemoglobin 13.3 12.0 - 16.0  g/dL    Hematocrit 39.7 37.0 - 47.0 %    MCV 94.3 81.4 - 97.8 fL    MCH 31.6 27.0 - 33.0 pg    MCHC 33.5 32.2 - 35.5 g/dL    RDW 51.0 (H) 35.9 - 50.0 fL    Platelet Count 163 (L) 164 - 446 K/uL    MPV 10.3 9.0 - 12.9 fL   Basic Metabolic Panel    Collection Time: 09/27/23  4:44 AM   Result Value Ref Range    Sodium 136 135 - 145 mmol/L    Potassium 3.8 3.6 - 5.5 mmol/L    Chloride 97 96 - 112 mmol/L    Co2 28 20 - 33 mmol/L    Glucose 81 65 - 99 mg/dL    Bun 23 (H) 8 - 22 mg/dL    Creatinine 1.34 0.50 - 1.40 mg/dL    Calcium 9.4 8.5 - 10.5 mg/dL    Anion Gap 11.0 7.0 - 16.0   ESTIMATED GFR    Collection Time: 09/27/23  4:44 AM   Result Value Ref Range    GFR (CKD-EPI) 39 (A) >60 mL/min/1.73 m 2         ASSESSMENT:  Patient is a 83 y.o. female admitted after GLF     [facility] Code / Diagnosis to Support: 0016 - Debility (Non-Cardiac, Non-Pulmonary)    Rehabilitation: Impaired ADLs and mobility  Patient is a poor candidate for inpatient rehab based on lack of support at home. Patient is not safe to DC home alone due to mobility and cognition, recommend SNF.     Barriers to transfer include: Insurance authorization, TCCs to verify disposition, medical clearance and bed availability     Additional Recommendations:  S/p GLF   - potential syncopal epsidoe?  - ECHO pending   - mildly hypotensive   - KIMBERLY hose ordered     Cognitive impairment   - GLF, patient hit head, no LOC   - CT head negative for acute intracranial abnormalities   - unclear if memory impairment is baseline or due to GLF, patieint lives alone   - agree with plans for post acute rehab, will need SNF for prolonged rehab course to go home at MOD I level   - continue with PT/OT, SLP ordered     Pulmonary edema   - pulmonary edema noted on CXR  - improved with lasix   - weaned to RA     MYRA  - admitted with elevated Cr   - Cr imporved to 1.34     Dispo:  - patient is currently functioning below their level of baseline, recommend post acute rehab  - patient is not a  candidate for IRF level therapy due to lack of support   - Recommend SNF level therapy   - PM&R will sign off         Medical Complexity:  S/p GLF   Cognitive impairment   Pulmonary edema   MYRA  Impaired mobility and ADLs       DVT PPX: heparin Q8h      Thank you for allowing us to participate in the care of this patient.     Patient was seen for 81 minutes on unit/floor of which > 50% of time was spent on counseling and coordination of care regarding the above, including prognosis, risk reduction, benefits of treatment, and options for next stage of care.    Monserrat Jo D.O.   Physical Medicine and Rehabilitation     Please note that this dictation was created using voice recognition software. I have made every reasonable attempt to correct obvious errors, but there may be errors of grammar and possibly content that I did not discover before finalizing the note.

## 2023-09-27 NOTE — PROGRESS NOTES
Assumed pt care with RN. Pt is A&OX3 (disoriented to time) and states she is in 0/10 pain and declines interventions at this time. Plan of care discussed, no further questions at this time but will continue to reinforce education. Personal belongings and call light within reach, bed alarm on.

## 2023-09-27 NOTE — PROGRESS NOTES
[facility] Medicine Daily Progress Note    Date of Service  9/27/2023    Chief Complaint  Gaye Antoine is a 83 y.o. female admitted 9/25/2023 with ground level fall.     Hospital Course  83 y.o. female with hypothyroidism who presented 9/25/2023 with ground-level fall.  Patient is a poor historian, so history is obtained from ER physician and chart review.  Patient had been found down by [employer] outside of her house.  When asked the patient, she states that she went outside to get her meal, and she suddenly fell backwards.  She is unable to explain to me why or how she fell.  She clearly struck her head when falling, as there is a small laceration back of her head.  CT head was negative for any intracranial hemorrhage.  However there is encephalomalacia.  On further work-up, her chest x-ray revealed some interstitial pulmonary edema.  Her TSH levels were 249.  She was also found to have an elevated creatinine at 1.59.  It is unclear when the last time the patient was seen by physician.  Patient will be admitted for failure to thrive, inability to care for self, management of her MYRA and pulmonary edema.    Interval Problem Update  9/26  and T4 undetectable, will start IV levothyroxine.  Creatinine improved 1.32.  Echocardiogram pending.  Vital stable on room air this morning.  Patient oriented x3 but does not know the events of what brought her to the hospital she can only state that she fell. Patient reports that she does not have a primary care physician even though she does have one documented in our system.  She reports that she has not seen them in many years.  She denies any previous history of hypothyroidism however on review of her records she has post ablative hypothyroidism and was supposed to be on Synthroid 175 mcg daily, last note from 2021.  Patient noted to have dark foul-smelling urine and is complaining of increased urinary frequency, UA on admission was positive start 3 days of  Rocephin.  PT/OT recommending postacute placement.  Patient already requesting to go home, I explained to her at length the importance of the IV thyroid replacement.    9/27 Vitals stable. Renal function stable. Urine culture pending. Patient still confused, repeats same questions not retaining information. Answers orientation questions correctly. Denies any dysuria, reports her urine is more clear today, frequency improving. Echo pending.     I have discussed this patient's plan of care and discharge plan at IDT rounds today with Case Management, Nursing, Nursing leadership, and other members of the IDT team.    Consultants/Specialty  N/A    Code Status  Full Code    Disposition  The patient is not medically cleared for discharge to home or a post-acute facility.  Anticipate discharge to: skilled nursing facility    I have placed the appropriate orders for post-discharge needs.    Review of Systems  Review of Systems   Constitutional:  Positive for malaise/fatigue. Negative for chills and fever.   Respiratory:  Negative for shortness of breath.    Cardiovascular:  Negative for chest pain.   Gastrointestinal:  Negative for abdominal pain, nausea and vomiting.   Genitourinary:  Negative for dysuria and frequency.   Musculoskeletal:  Positive for falls.   Skin:  Negative for rash.   Neurological:  Negative for dizziness and headaches.   Psychiatric/Behavioral:  Positive for memory loss. Negative for depression.    All other systems reviewed and are negative.       Physical Exam  Temp:  [36.1 °C (97 °F)-36.8 °C (98.3 °F)] 36.3 °C (97.4 °F)  Pulse:  [65-82] 66  Resp:  [16-18] 16  BP: ()/(63-75) 98/70  SpO2:  [92 %-96 %] 92 %    Physical Exam  Vitals and nursing note reviewed.   Constitutional:       General: She is not in acute distress.     Appearance: She is ill-appearing.   HENT:      Head: Normocephalic and atraumatic.      Mouth/Throat:      Pharynx: Oropharynx is clear.   Eyes:      Conjunctiva/sclera:  Conjunctivae normal.   Cardiovascular:      Rate and Rhythm: Normal rate and regular rhythm.      Pulses: Normal pulses.   Pulmonary:      Effort: Pulmonary effort is normal. No respiratory distress.      Breath sounds: Normal breath sounds. No wheezing.   Abdominal:      General: Abdomen is flat. There is no distension.      Palpations: Abdomen is soft.      Tenderness: There is no abdominal tenderness.   Musculoskeletal:         General: Normal range of motion.      Cervical back: Neck supple.      Right lower leg: No edema.      Left lower leg: No edema.   Skin:     General: Skin is warm and dry.   Neurological:      General: No focal deficit present.      Mental Status: She is alert. She is disoriented.      Cranial Nerves: No cranial nerve deficit.      Motor: Weakness present.      Comments: A&Ox3, not to situation    Psychiatric:         Behavior: Behavior normal.         Fluids    Intake/Output Summary (Last 24 hours) at 9/27/2023 1359  Last data filed at 9/27/2023 1000  Gross per 24 hour   Intake 540 ml   Output --   Net 540 ml       Laboratory  Recent Labs     09/25/23  1414 09/26/23  0127 09/27/23  0444   WBC 6.4 5.0 5.0   RBC 4.90 4.28 4.21   HEMOGLOBIN 15.0 13.3 13.3   HEMATOCRIT 45.9 40.5 39.7   MCV 93.7 94.6 94.3   MCH 30.6 31.1 31.6   MCHC 32.7 32.8 33.5   RDW 52.4* 51.8* 51.0*   PLATELETCT 204 180 163*   MPV 10.4 10.2 10.3     Recent Labs     09/25/23  1414 09/26/23  0127 09/27/23  0444   SODIUM 143 141 136   POTASSIUM 3.5* 3.8 3.8   CHLORIDE 104 105 97   CO2 24 26 28   GLUCOSE 100* 101* 81   BUN 21 22 23*   CREATININE 1.59* 1.32 1.34   CALCIUM 9.3 9.0 9.4             Recent Labs     09/26/23  0127   TRIGLYCERIDE 86   HDL 66   *       Imaging  EC-ECHOCARDIOGRAM LTD W/O CONT         DX-CHEST-PORTABLE (1 VIEW)   Final Result      1.  Persistently enlarged cardiac silhouette   2.  Interstitial pulmonary edema   3.  Bibasilar underinflation atelectasis      CT-CSPINE WITHOUT PLUS RECONS   Final  Result      No acute abnormality identified.      CT-HEAD W/O   Final Result      1.  No acute intracranial abnormality.   2.  Small areas of encephalomalacia in the BILATERAL cerebellar hemispheres   3.  Moderate area of encephalomalacia in the RIGHT MCA territory as described above   4.  Atrophy   5.  White matter changes                 Assessment/Plan  * Acute respiratory failure with hypoxia (HCC)- (present on admission)  Assessment & Plan  Normally does not use oxygen at baseline  Currently requiring between 2-3L   Secondary to pulmonary edema  S/p IV lasix   Day team to reassess given kidney function     Resolved, on room air     Acute cystitis without hematuria- (present on admission)  Assessment & Plan  Patient reporting urinary frequency  UA positive, urine culture pending  Rocephin x3 days    Dementia (HCC)  Assessment & Plan  Dementia vs altered mental status  Unclear what patients baseline is  She is oriented to place and self    Acute kidney injury (HCC)  Assessment & Plan  Last creatinine was several years ago with a creatinine level 0.66.  Unclear with the patient's new baseline is.  However patient did present with a creatinine of 1.59.  Possibly pre-renal  Patient received bolus in the ED  Follow-up urinalysis    Fall  Assessment & Plan  Patient presented status post fall  CT head negative for any intracranial hemorrhage  PT/OT- post acute     Hypokalemia  Assessment & Plan  Replete with IV or PO for goal > 4.0     Pulmonary edema  Assessment & Plan  Interstitial pulmonary edema found on chest x-ray  Initially requiring 2L supplemental oxygen in the ED, but currently saturating appropriately on room air   Echo pending     Now on room air    Encephalomalacia  Assessment & Plan  Seen on CT head on admission.  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  She is prescribed Synthroid at home, however  she has not been taking it.  Her  patient is 249, and T4 levels are undetectable.     Patient still confused, not a good historian   IV levothyroxine ordered x3 days   Will need a PCP to follow up repeat TSH/T4 after discharge          VTE prophylaxis: heparin ppx     I have performed a physical exam and reviewed and updated ROS and Plan today (9/27/2023). In review of yesterday's note (9/26/2023), there are no changes except as documented above.

## 2023-09-28 ENCOUNTER — PATIENT OUTREACH (OUTPATIENT)
Dept: SCHEDULING | Facility: IMAGING CENTER | Age: 83
End: 2023-09-28
Payer: MEDICARE

## 2023-09-28 VITALS
SYSTOLIC BLOOD PRESSURE: 90 MMHG | BODY MASS INDEX: 24.73 KG/M2 | HEART RATE: 71 BPM | OXYGEN SATURATION: 92 % | RESPIRATION RATE: 18 BRPM | WEIGHT: 163.14 LBS | HEIGHT: 68 IN | DIASTOLIC BLOOD PRESSURE: 63 MMHG | TEMPERATURE: 97.7 F

## 2023-09-28 LAB
ANION GAP SERPL CALC-SCNC: 15 MMOL/L (ref 7–16)
BACTERIA UR CULT: ABNORMAL
BUN SERPL-MCNC: 19 MG/DL (ref 8–22)
CALCIUM SERPL-MCNC: 9.1 MG/DL (ref 8.5–10.5)
CHLORIDE SERPL-SCNC: 96 MMOL/L (ref 96–112)
CO2 SERPL-SCNC: 26 MMOL/L (ref 20–33)
CREAT SERPL-MCNC: 1.17 MG/DL (ref 0.5–1.4)
ERYTHROCYTE [DISTWIDTH] IN BLOOD BY AUTOMATED COUNT: 48.7 FL (ref 35.9–50)
GFR SERPLBLD CREATININE-BSD FMLA CKD-EPI: 46 ML/MIN/1.73 M 2
GLUCOSE SERPL-MCNC: 109 MG/DL (ref 65–99)
HCT VFR BLD AUTO: 45.1 % (ref 37–47)
HGB BLD-MCNC: 15.2 G/DL (ref 12–16)
MCH RBC QN AUTO: 30.9 PG (ref 27–33)
MCHC RBC AUTO-ENTMCNC: 33.7 G/DL (ref 32.2–35.5)
MCV RBC AUTO: 91.7 FL (ref 81.4–97.8)
PLATELET # BLD AUTO: 187 K/UL (ref 164–446)
PMV BLD AUTO: 10.7 FL (ref 9–12.9)
POTASSIUM SERPL-SCNC: 3.6 MMOL/L (ref 3.6–5.5)
RBC # BLD AUTO: 4.92 M/UL (ref 4.2–5.4)
SIGNIFICANT IND 70042: ABNORMAL
SITE SITE: ABNORMAL
SODIUM SERPL-SCNC: 137 MMOL/L (ref 135–145)
SOURCE SOURCE: ABNORMAL
WBC # BLD AUTO: 6.5 K/UL (ref 4.8–10.8)

## 2023-09-28 PROCEDURE — 90471 IMMUNIZATION ADMIN: CPT

## 2023-09-28 PROCEDURE — 80048 BASIC METABOLIC PNL TOTAL CA: CPT

## 2023-09-28 PROCEDURE — 3E02340 INTRODUCTION OF INFLUENZA VACCINE INTO MUSCLE, PERCUTANEOUS APPROACH: ICD-10-PCS | Performed by: INTERNAL MEDICINE

## 2023-09-28 PROCEDURE — 700111 HCHG RX REV CODE 636 W/ 250 OVERRIDE (IP): Performed by: INTERNAL MEDICINE

## 2023-09-28 PROCEDURE — A9270 NON-COVERED ITEM OR SERVICE: HCPCS | Performed by: STUDENT IN AN ORGANIZED HEALTH CARE EDUCATION/TRAINING PROGRAM

## 2023-09-28 PROCEDURE — 700111 HCHG RX REV CODE 636 W/ 250 OVERRIDE (IP): Performed by: STUDENT IN AN ORGANIZED HEALTH CARE EDUCATION/TRAINING PROGRAM

## 2023-09-28 PROCEDURE — 85027 COMPLETE CBC AUTOMATED: CPT

## 2023-09-28 PROCEDURE — 90686 IIV4 VACC NO PRSV 0.5 ML IM: CPT | Performed by: INTERNAL MEDICINE

## 2023-09-28 PROCEDURE — 700102 HCHG RX REV CODE 250 W/ 637 OVERRIDE(OP): Performed by: STUDENT IN AN ORGANIZED HEALTH CARE EDUCATION/TRAINING PROGRAM

## 2023-09-28 PROCEDURE — 99239 HOSP IP/OBS DSCHRG MGMT >30: CPT | Performed by: INTERNAL MEDICINE

## 2023-09-28 PROCEDURE — 36415 COLL VENOUS BLD VENIPUNCTURE: CPT

## 2023-09-28 PROCEDURE — 700101 HCHG RX REV CODE 250: Performed by: INTERNAL MEDICINE

## 2023-09-28 PROCEDURE — 92523 SPEECH SOUND LANG COMPREHEN: CPT

## 2023-09-28 RX ORDER — LEVOTHYROXINE SODIUM 0.12 MG/1
125 TABLET ORAL
Qty: 30 TABLET | Refills: 0 | Status: ON HOLD
Start: 2023-09-29 | End: 2023-12-29

## 2023-09-28 RX ORDER — ACETAMINOPHEN 325 MG/1
650 TABLET ORAL EVERY 6 HOURS PRN
Qty: 30 TABLET | Refills: 0 | Status: SHIPPED
Start: 2023-09-28 | End: 2023-12-28

## 2023-09-28 RX ADMIN — INFLUENZA A VIRUS A/VICTORIA/4897/2022 IVR-238 (H1N1) ANTIGEN (FORMALDEHYDE INACTIVATED), INFLUENZA A VIRUS A/DARWIN/9/2021 SAN-010 (H3N2) ANTIGEN (FORMALDEHYDE INACTIVATED), INFLUENZA B VIRUS B/PHUKET/3073/2013 ANTIGEN (FORMALDEHYDE INACTIVATED), AND INFLUENZA B VIRUS B/MICHIGAN/01/2021 ANTIGEN (FORMALDEHYDE INACTIVATED) 0.5 ML: 15; 15; 15; 15 INJECTION, SUSPENSION INTRAMUSCULAR at 16:05

## 2023-09-28 RX ADMIN — SENNOSIDES AND DOCUSATE SODIUM 2 TABLET: 50; 8.6 TABLET ORAL at 05:01

## 2023-09-28 RX ADMIN — CEFTRIAXONE SODIUM 1000 MG: 10 INJECTION, POWDER, FOR SOLUTION INTRAVENOUS at 14:02

## 2023-09-28 RX ADMIN — LEVOTHYROXINE SODIUM ANHYDROUS 50 MCG: 100 INJECTION, POWDER, LYOPHILIZED, FOR SOLUTION INTRAVENOUS at 11:33

## 2023-09-28 RX ADMIN — FUROSEMIDE 40 MG: 10 INJECTION INTRAMUSCULAR; INTRAVENOUS at 05:00

## 2023-09-28 RX ADMIN — HEPARIN SODIUM 5000 UNITS: 5000 INJECTION, SOLUTION INTRAVENOUS; SUBCUTANEOUS at 05:00

## 2023-09-28 ASSESSMENT — PAIN DESCRIPTION - PAIN TYPE
TYPE: ACUTE PAIN
TYPE: ACUTE PAIN

## 2023-09-28 NOTE — PROGRESS NOTES
Report received from day RN and assumed patient care at 1900. Patient is sleeping comfortable in the bed, unlabored breathing, on RA, and bed alarm on. Call light within reach and bed in lowest position. Reinforced the need to call for assistance. Plan of care discussed and patient does not have any further needs at this time.

## 2023-09-28 NOTE — DISCHARGE SUMMARY
Discharge Summary    CHIEF COMPLAINT ON ADMISSION  Chief Complaint   Patient presents with    T-5000 GLF     BIB EMS from home. Pt was found down outside of her house by [employer]. Pt clearly had a GLF with a head injury. Lac noted to back of head. Blood was noted to stair step into house.   Pt denies LOC. Denies blood thinners or ASA.   PD was called in regards to pt living conditions. House is disarray. Pt dog was taken by animal control. EPS was notified by Bart Chatterjee.   BS: 112.     Head Injury       Reason for Admission  TBI    Admission Date  9/25/2023     CODE STATUS  Full Code    HPI & HOSPITAL COURSE  This is a 83 y.o. female here with ground-level fall    83 y.o. female with hypothyroidism who presented 9/25/2023 with ground-level fall.  Patient is a poor historian, so history is obtained from ER physician and chart review.  Patient had been found down by [employer] outside of her house.  When asked the patient, she states that she went outside to get her meal, and she suddenly fell backwards.  She is unable to explain to me why or how she fell.  She clearly struck her head when falling, as there is a small laceration back of her head.  CT head was negative for any intracranial hemorrhage.  However there is encephalomalacia.  On further work-up, her chest x-ray revealed some interstitial pulmonary edema.  Her TSH levels were 249 with undetectable T4.  She was also found to have an elevated creatinine at 1.59.  Patient was admitted and started on IV levothyroxine for florid hypothyroidism with mental status changes, no myxedema coma.  Patient also complained of urinary frequency and UA was positive with urine culture with E. coli and Klebsiella.  She has finished 3 days of IV Rocephin to complete her UTI treatment.  Patient was given full 3 days of IV thyroid replacement and she will be discharged on levothyroxine 125 mg daily by weight dosing.  She is to follow-up with primary care in 3 to 4  weeks to follow-up on her thyroid function testing and further management of her levothyroxine dosing.  PT/OT evaluated the patient recommended postacute placement.  Patient's vital signs are stable, her renal function has improved.  Initially there was concern for pulmonary edema as she had mild hypoxia and a possible infiltrates on chest x-ray.  Echocardiogram showed EF 65% and the patient has since been stable on room air.  Patient is ready for discharge to SNF.    Therefore, she is discharged in fair and stable condition to skilled nursing facility.    The patient met 2-midnight criteria for an inpatient stay at the time of discharge.      FOLLOW UP ITEMS POST DISCHARGE  Follow-up with primary care for repeat thyroid testing    DISCHARGE DIAGNOSES  Principal Problem:    Acute respiratory failure with hypoxia (HCC) (POA: Yes)  Active Problems:    Hypothyroidism, postablative (POA: Yes)    Encephalomalacia (POA: Unknown)    Pulmonary edema (POA: Unknown)    Hypokalemia (POA: Unknown)    Fall (POA: Unknown)    Acute kidney injury (HCC) (POA: Unknown)    Dementia (HCC) (POA: Unknown)    Acute cystitis without hematuria (POA: Yes)  Resolved Problems:    * No resolved hospital problems. *      FOLLOW UP  No future appointments.  [facility]  [address] [facility] [place] [address]  [phone]          MEDICATIONS ON DISCHARGE     Medication List        START taking these medications        Instructions   acetaminophen 325 MG Tabs  Commonly known as: Tylenol   Take 2 Tablets by mouth every 6 hours as needed for Moderate Pain, Mild Pain or Fever.  Dose: 650 mg     levothyroxine 125 MCG Tabs  Start taking on: September 29, 2023  Commonly known as: Synthroid   Take 1 Tablet by mouth every morning on an empty stomach.  Dose: 125 mcg              Allergies  Allergies   Allergen Reactions    Sulfa Drugs      Not sure       DIET  Orders Placed This Encounter   Procedures    Diet Order Diet: Regular      Standing Status:   Standing     Number of Occurrences:   1     Order Specific Question:   Diet:     Answer:   Regular [1]       ACTIVITY  As tolerated.  Weight bearing as tolerated    LINES, DRAINS, AND WOUNDS  This is an automated list. Peripheral IVs will be removed prior to discharge.  Peripheral IV 09/25/23 20 G Right Antecubital (Active)   Site Assessment Clean;Dry;Intact 09/27/23 2100   Dressing Type Transparent 09/27/23 2100   Line Status Saline locked;Scrubbed the hub prior to access;Flushed 09/27/23 2100   Dressing Status Clean;Dry;Intact 09/27/23 2100   Dressing Intervention N/A 09/27/23 2100   Dressing Change Due 09/30/23 09/27/23 2100   Infiltration Grading ([facility], [facility]) 0 09/27/23 2100   Phlebitis Scale ([facility] Only) 0 09/27/23 2100          Peripheral IV 09/25/23 20 G Right Antecubital (Active)   Site Assessment Clean;Dry;Intact 09/27/23 2100   Dressing Type Transparent 09/27/23 2100   Line Status Saline locked;Scrubbed the hub prior to access;Flushed 09/27/23 2100   Dressing Status Clean;Dry;Intact 09/27/23 2100   Dressing Intervention N/A 09/27/23 2100   Dressing Change Due 09/30/23 09/27/23 2100   Infiltration Grading ([facility], [facility]) 0 09/27/23 2100   Phlebitis Scale ([facility] Only) 0 09/27/23 2100               MENTAL STATUS ON TRANSFER             CONSULTATIONS  N/A    PROCEDURES  N/A    LABORATORY  Lab Results   Component Value Date    SODIUM 137 09/28/2023    POTASSIUM 3.6 09/28/2023    CHLORIDE 96 09/28/2023    CO2 26 09/28/2023    GLUCOSE 109 (H) 09/28/2023    BUN 19 09/28/2023    CREATININE 1.17 09/28/2023    CREATININE 0.8 12/31/2008        Lab Results   Component Value Date    WBC 6.5 09/28/2023    HEMOGLOBIN 15.2 09/28/2023    HEMATOCRIT 45.1 09/28/2023    PLATELETCT 187 09/28/2023        Total time of the discharge process exceeds 33 minutes.

## 2023-09-28 NOTE — PROGRESS NOTES
Called and gave report to MICHELLE vega at [facility]. Gave call back number for any questions

## 2023-09-28 NOTE — DISCHARGE PLANNING
Case Management Discharge Planning    Admission Date: 9/25/2023  GMLOS: 4.3  ALOS: 3    6-Clicks ADL Score: 21  6-Clicks Mobility Score: 12  PT and/or OT Eval ordered: Yes  Post-acute Referrals Ordered: Yes  Post-acute Choice Obtained: Yes  Has referral(s) been sent to post-acute provider:  Yes      Anticipated Discharge Dispo: Discharge Disposition: D/T to SNF with Medicare cert in anticipation of skilled care (03)    DME Needed: No    Action(s) Taken: Acceptance Received  Pt has been accepted at [facility] and they have available bed today. RN SIMON reached out to Dr. London, per Dr. London Pt is good to go by 1 pm.   RN  SIMON spoke to Pt's sister Rashida regarding Pt's discharge. Per Rashida her plan is to put Pt is an assisted living after SNF discharge.      [facility] set up transport by 1600.    COBRA packet completed, signed by Pt, given to bedside nurse.      Escalations Completed: Provider    Medically Clear: Yes    Next Steps: RN SIMON to continue to assist as needs arise    Barriers to Discharge: None    Is the patient up for discharge tomorrow: No  Pt is discharging today.

## 2023-09-28 NOTE — DISCHARGE PLANNING
Spoke To: Nena  Agency/Facility Name: [facility]  Plan or Request: accepted, bed available     0900- Transport time today at 1600 going to [facility] with rigoberto fung.

## 2023-09-28 NOTE — CARE PLAN
The patient is Stable - Low risk of patient condition declining or worsening    Shift Goals  Clinical Goals: safety, monitor labs  Patient Goals: DAKOTA  Family Goals: DAKOTA    Progress made toward(s) clinical / shift goals:       Problem: Fall Risk  Goal: Patient will remain free from falls  9/28/2023 0423 by Maryann Fitzgerald, R.N.  Outcome: Progressing  Note: Pt free of falls, fall prevention measures in place and charted.   9/27/2023 2252 by Maryann Fitzgerald, R.N.  Outcome: Progressing       Patient is not progressing towards the following goals:      Problem: Knowledge Deficit - Standard  Goal: Patient and family/care givers will demonstrate understanding of plan of care, disease process/condition, diagnostic tests and medications  9/28/2023 0423 by Maryann Fitzgerald, R.N.  Outcome: Not Met  Note: DAKOTA, pt disoriented   9/27/2023 2252 by Maryann Fitzgerald, R.N.  Outcome: Not Met  Note: DAKOTA pt understanding of care plan and intervention due to disorientation

## 2023-09-28 NOTE — THERAPY
Speech Language Pathology   Cognitive Evaluation     Patient Name: Gaye Antoine  AGE:  83 y.o., SEX:  female  Medical Record #: [id number]  Date of Service: 2023      History of Present Illness  84 y/o F admit  after being found down by [employer]. Pt w/ unwitnessed GLF w/ head injury. Head CT was negative for acute abnormality. EMS found pt's house in disarray; concerns for failure to thrive.     General Information  Vitals  O2 Delivery Device: None - Room Air  Level of Consciousness: Alert  Patient Behaviors: Confused  Orientation: Self, , General place  Follows Directives: Yes    Prior Living Situation & Level of Function  Prior Services: [employer]  Housing / Facility: 2 Story House  Lives with - Patient's Self Care Capacity: Alone and Unable to Care For Self  Comments: Pt reports that she has a sister living in [place]  Education: Completed College  Communication: WNL  Swallowing: WNL     Oral Mechanism Evaluation  Dentition: Good  Facial Symmetry: Equal  Facial Sensation: Equal  Labial Observations: WFL  Lingual Observations: Midline  Motor Speech: WNL    Laryngeal Function Exam  Secretion Management: Adequate  Voice Quality: WFL    Subjective  Pt seen alert & cooperative for a cognitive-linguistic evaluation    Communication Domain(s)  Expressive Language: WFL  Receptive Language: Mild (For complex information (e.g., 3 step commands, complex yes/no questions) - stemming from cognitive deficits)  Cognitive-Linguistic: Moderate   Social/Pragmatic: WFL    Assessment  The patient was seen this date for a cognitive-linguistic evaluation.    Cognistat  Orientation: Severe (5)  - The pt was disoriented to the year, month and location  Attention: Average (8)  Comprehension: Mild (4)  Repetition: Average (12)  Naming: Average (8)  Memory: Severe (2)  Calculations: Moderate (1)  Similarities: Average (8)  Judgement: Mild (3)  - Pt able to provide an incomplete response to basic safety and problem  solving situations; however needed cueing to come up with a complete and effective response    Other:   Medication Management: +0/3 questions correct relating to reading a simple medication label and answering dosage questions correctly.   Clock Drawing: Generalized errors w/ hand placement, numbers placement and spatial organization of the clock  Insight into deficits: Reduced; pt was unable to verbalize how deficits would impact her safety in her living situation    Clinical Impressions  Clinical signs of moderate cognitive-linguistic deficits, suspect acute-on-chronic; given generalized nature of deficits and EMS noting house in disarray on admit- suspect pt had premorbid cognitive difficulties. She will require assistance w/ iADLs including medication management, cooking and MD appointments. ST to initiate trial ST tx and assess whether pt is responsive to compensatory strategies to improve independence.     NOTE: It is not within the scope of practice of Speech-Language Pathologists to determine patient capacity. Please defer to the physician or psych to complete this assessment.     Recommendations  Supervision Needs Upons Discharge: Direct assistance with IADLs (see below)  IADLs: Medication management, Financial management, Appointment management, Household chores, Cooking  Consult Referral(s): Neuropsychologist    SLP Treatment Plan  Treatment Plan: Cognitive Treatment  SLP Frequency: 2x Per Week  Estimated Duration: Until Therapy Goals Met    Anticipated Discharge Needs  Discharge Recommendations: Recommend post-acute placement for additional speech therapy services prior to discharge home  Therapy Recommendations Upon DC: Cognitive-Linguistic Training, Patient / Family / Caregiver Education    Patient / Family Goals  Patient / Family Goal #1: I want coffee  Goal #1 Outcome: Progressing as expected  Short Term Goal # 1: Pt will implement functional lists to compensate for memory deficits with 80% accuracy  and min cueing  Short Term Goal # 2: Pt will participate in instruction and use of visual aids to facilitate orientation w/ 80% accuracy and min cueing  Short Term Goal # 3: Pt will participate in establishing an assistive memory device including personal information, safety cues, checklists,, routines and appointments with 90% accuracy and min cueing      Chanel Blanco, SLP

## 2023-09-28 NOTE — PROGRESS NOTES
Assumed care of pt at 0700. Report received from nightshift RN. Pt is A&O x3 and on RA. Pt denies pain and SOB at this time. Pt presents with even non-labored breaths. Pt educated on how to use the call light, pt verbalized understanding. Pt is sitting up in bed to eat breakfast. Bed in lowest and locked position. Bed alarm on. Call light within reach and pt declines any further needs at this time.

## 2023-10-30 ENCOUNTER — APPOINTMENT (OUTPATIENT)
Dept: MEDICAL GROUP | Age: 83
End: 2023-10-30
Payer: MEDICARE

## 2023-12-01 ENCOUNTER — APPOINTMENT (OUTPATIENT)
Dept: MEDICAL GROUP | Age: 83
End: 2023-12-01
Payer: MEDICARE

## 2023-12-05 ENCOUNTER — TELEPHONE (OUTPATIENT)
Dept: INTERNAL MEDICINE | Facility: OTHER | Age: 83
End: 2023-12-05
Payer: MEDICARE

## 2023-12-05 ENCOUNTER — PATIENT OUTREACH (OUTPATIENT)
Dept: INTERNAL MEDICINE | Facility: OTHER | Age: 83
End: 2023-12-05
Payer: MEDICARE

## 2023-12-05 NOTE — PROGRESS NOTES
CHAVA Ziegler left voicemail for patient's sister Rashida Spears [phone] who is seeking information about obtaining adult guardianship for Gaye. CHAVA Ziegler left contact information for [facility] [employer] [phone] that offers free and low-cost legal services. CHAVA Ziegler left direct phone number [phone] if Rashida has any questions or concerns. CHAVA Ziegler will follow-up as needed.

## 2023-12-20 ENCOUNTER — HOSPITAL ENCOUNTER (OUTPATIENT)
Facility: MEDICAL CENTER | Age: 83
End: 2023-12-20
Attending: INTERNAL MEDICINE
Payer: MEDICARE

## 2023-12-20 LAB
ALBUMIN SERPL BCP-MCNC: 3.8 G/DL (ref 3.2–4.9)
ALBUMIN/GLOB SERPL: 1.5 G/DL
ALP SERPL-CCNC: 93 U/L (ref 30–99)
ALT SERPL-CCNC: 13 U/L (ref 2–50)
ANION GAP SERPL CALC-SCNC: 11 MMOL/L (ref 7–16)
AST SERPL-CCNC: 21 U/L (ref 12–45)
BASOPHILS # BLD AUTO: 0.4 % (ref 0–1.8)
BASOPHILS # BLD: 0.03 K/UL (ref 0–0.12)
BILIRUB SERPL-MCNC: 0.3 MG/DL (ref 0.1–1.5)
BUN SERPL-MCNC: 13 MG/DL (ref 8–22)
CALCIUM ALBUM COR SERPL-MCNC: 9.5 MG/DL (ref 8.5–10.5)
CALCIUM SERPL-MCNC: 9.3 MG/DL (ref 8.5–10.5)
CHLORIDE SERPL-SCNC: 103 MMOL/L (ref 96–112)
CO2 SERPL-SCNC: 27 MMOL/L (ref 20–33)
CREAT SERPL-MCNC: 0.76 MG/DL (ref 0.5–1.4)
EOSINOPHIL # BLD AUTO: 0.1 K/UL (ref 0–0.51)
EOSINOPHIL NFR BLD: 1.3 % (ref 0–6.9)
ERYTHROCYTE [DISTWIDTH] IN BLOOD BY AUTOMATED COUNT: 45.7 FL (ref 35.9–50)
FASTING STATUS PATIENT QL REPORTED: NORMAL
GFR SERPLBLD CREATININE-BSD FMLA CKD-EPI: 77 ML/MIN/1.73 M 2
GLOBULIN SER CALC-MCNC: 2.5 G/DL (ref 1.9–3.5)
GLUCOSE SERPL-MCNC: 74 MG/DL (ref 65–99)
HCT VFR BLD AUTO: 48.5 % (ref 37–47)
HGB BLD-MCNC: 15.3 G/DL (ref 12–16)
IMM GRANULOCYTES # BLD AUTO: 0.03 K/UL (ref 0–0.11)
IMM GRANULOCYTES NFR BLD AUTO: 0.4 % (ref 0–0.9)
LYMPHOCYTES # BLD AUTO: 2.02 K/UL (ref 1–4.8)
LYMPHOCYTES NFR BLD: 27.2 % (ref 22–41)
MCH RBC QN AUTO: 30.1 PG (ref 27–33)
MCHC RBC AUTO-ENTMCNC: 31.5 G/DL (ref 32.2–35.5)
MCV RBC AUTO: 95.3 FL (ref 81.4–97.8)
MONOCYTES # BLD AUTO: 0.66 K/UL (ref 0–0.85)
MONOCYTES NFR BLD AUTO: 8.9 % (ref 0–13.4)
NEUTROPHILS # BLD AUTO: 4.59 K/UL (ref 1.82–7.42)
NEUTROPHILS NFR BLD: 61.8 % (ref 44–72)
NRBC # BLD AUTO: 0 K/UL
NRBC BLD-RTO: 0 /100 WBC (ref 0–0.2)
PLATELET # BLD AUTO: 241 K/UL (ref 164–446)
PMV BLD AUTO: 10.6 FL (ref 9–12.9)
POTASSIUM SERPL-SCNC: 4.4 MMOL/L (ref 3.6–5.5)
PROT SERPL-MCNC: 6.3 G/DL (ref 6–8.2)
RBC # BLD AUTO: 5.09 M/UL (ref 4.2–5.4)
SODIUM SERPL-SCNC: 141 MMOL/L (ref 135–145)
WBC # BLD AUTO: 7.4 K/UL (ref 4.8–10.8)

## 2023-12-20 PROCEDURE — 80053 COMPREHEN METABOLIC PANEL: CPT

## 2023-12-20 PROCEDURE — 85025 COMPLETE CBC W/AUTO DIFF WBC: CPT

## 2023-12-28 ENCOUNTER — HOSPITAL ENCOUNTER (INPATIENT)
Facility: MEDICAL CENTER | Age: 83
LOS: 5 days | DRG: 177 | End: 2024-01-02
Attending: STUDENT IN AN ORGANIZED HEALTH CARE EDUCATION/TRAINING PROGRAM | Admitting: STUDENT IN AN ORGANIZED HEALTH CARE EDUCATION/TRAINING PROGRAM
Payer: MEDICARE

## 2023-12-28 ENCOUNTER — APPOINTMENT (OUTPATIENT)
Dept: RADIOLOGY | Facility: MEDICAL CENTER | Age: 83
DRG: 177 | End: 2023-12-28
Attending: STUDENT IN AN ORGANIZED HEALTH CARE EDUCATION/TRAINING PROGRAM
Payer: MEDICARE

## 2023-12-28 DIAGNOSIS — R45.1 AGITATION: ICD-10-CM

## 2023-12-28 DIAGNOSIS — U07.1 ACUTE HYPOXEMIC RESPIRATORY FAILURE DUE TO COVID-19 (HCC): ICD-10-CM

## 2023-12-28 DIAGNOSIS — J96.01 ACUTE RESPIRATORY FAILURE WITH HYPOXIA (HCC): ICD-10-CM

## 2023-12-28 DIAGNOSIS — U07.1 COVID-19: ICD-10-CM

## 2023-12-28 DIAGNOSIS — J96.01 ACUTE HYPOXEMIC RESPIRATORY FAILURE DUE TO COVID-19 (HCC): ICD-10-CM

## 2023-12-28 PROBLEM — J12.82 PNEUMONIA DUE TO COVID-19 VIRUS: Status: ACTIVE | Noted: 2023-12-28

## 2023-12-28 PROBLEM — R73.9 HYPERGLYCEMIA: Status: ACTIVE | Noted: 2023-12-28

## 2023-12-28 PROBLEM — F39 MOOD DISORDER (HCC): Status: ACTIVE | Noted: 2023-12-28

## 2023-12-28 LAB
ALBUMIN SERPL BCP-MCNC: 3.2 G/DL (ref 3.2–4.9)
ALBUMIN/GLOB SERPL: 1.3 G/DL
ALP SERPL-CCNC: 76 U/L (ref 30–99)
ALT SERPL-CCNC: 17 U/L (ref 2–50)
ANION GAP SERPL CALC-SCNC: 10 MMOL/L (ref 7–16)
AST SERPL-CCNC: 20 U/L (ref 12–45)
BASOPHILS # BLD AUTO: 0.2 % (ref 0–1.8)
BASOPHILS # BLD: 0.01 K/UL (ref 0–0.12)
BILIRUB SERPL-MCNC: 0.3 MG/DL (ref 0.1–1.5)
BUN SERPL-MCNC: 21 MG/DL (ref 8–22)
CALCIUM ALBUM COR SERPL-MCNC: 9.2 MG/DL (ref 8.5–10.5)
CALCIUM SERPL-MCNC: 8.6 MG/DL (ref 8.5–10.5)
CHLORIDE SERPL-SCNC: 103 MMOL/L (ref 96–112)
CO2 SERPL-SCNC: 28 MMOL/L (ref 20–33)
CREAT SERPL-MCNC: 0.71 MG/DL (ref 0.5–1.4)
CRP SERPL HS-MCNC: 5.79 MG/DL (ref 0–0.75)
EOSINOPHIL # BLD AUTO: 0.05 K/UL (ref 0–0.51)
EOSINOPHIL NFR BLD: 0.8 % (ref 0–6.9)
ERYTHROCYTE [DISTWIDTH] IN BLOOD BY AUTOMATED COUNT: 42.7 FL (ref 35.9–50)
GFR SERPLBLD CREATININE-BSD FMLA CKD-EPI: 84 ML/MIN/1.73 M 2
GLOBULIN SER CALC-MCNC: 2.4 G/DL (ref 1.9–3.5)
GLUCOSE SERPL-MCNC: 172 MG/DL (ref 65–99)
HCT VFR BLD AUTO: 40 % (ref 37–47)
HGB BLD-MCNC: 13.2 G/DL (ref 12–16)
IMM GRANULOCYTES # BLD AUTO: 0.03 K/UL (ref 0–0.11)
IMM GRANULOCYTES NFR BLD AUTO: 0.5 % (ref 0–0.9)
LDH SERPL L TO P-CCNC: 208 U/L (ref 107–266)
LYMPHOCYTES # BLD AUTO: 1.96 K/UL (ref 1–4.8)
LYMPHOCYTES NFR BLD: 31.2 % (ref 22–41)
MCH RBC QN AUTO: 29.9 PG (ref 27–33)
MCHC RBC AUTO-ENTMCNC: 33 G/DL (ref 32.2–35.5)
MCV RBC AUTO: 90.5 FL (ref 81.4–97.8)
MONOCYTES # BLD AUTO: 0.37 K/UL (ref 0–0.85)
MONOCYTES NFR BLD AUTO: 5.9 % (ref 0–13.4)
NEUTROPHILS # BLD AUTO: 3.87 K/UL (ref 1.82–7.42)
NEUTROPHILS NFR BLD: 61.4 % (ref 44–72)
NRBC # BLD AUTO: 0 K/UL
NRBC BLD-RTO: 0 /100 WBC (ref 0–0.2)
PLATELET # BLD AUTO: 197 K/UL (ref 164–446)
PMV BLD AUTO: 10.3 FL (ref 9–12.9)
POTASSIUM SERPL-SCNC: 4.1 MMOL/L (ref 3.6–5.5)
PROCALCITONIN SERPL-MCNC: 0.14 NG/ML
PROT SERPL-MCNC: 5.6 G/DL (ref 6–8.2)
RBC # BLD AUTO: 4.42 M/UL (ref 4.2–5.4)
SODIUM SERPL-SCNC: 141 MMOL/L (ref 135–145)
WBC # BLD AUTO: 6.3 K/UL (ref 4.8–10.8)

## 2023-12-28 PROCEDURE — 99285 EMERGENCY DEPT VISIT HI MDM: CPT

## 2023-12-28 PROCEDURE — 86140 C-REACTIVE PROTEIN: CPT

## 2023-12-28 PROCEDURE — 84145 PROCALCITONIN (PCT): CPT

## 2023-12-28 PROCEDURE — 99222 1ST HOSP IP/OBS MODERATE 55: CPT | Mod: AI | Performed by: STUDENT IN AN ORGANIZED HEALTH CARE EDUCATION/TRAINING PROGRAM

## 2023-12-28 PROCEDURE — 96372 THER/PROPH/DIAG INJ SC/IM: CPT

## 2023-12-28 PROCEDURE — 87040 BLOOD CULTURE FOR BACTERIA: CPT

## 2023-12-28 PROCEDURE — 85025 COMPLETE CBC W/AUTO DIFF WBC: CPT

## 2023-12-28 PROCEDURE — 770001 HCHG ROOM/CARE - MED/SURG/GYN PRIV*

## 2023-12-28 PROCEDURE — 83615 LACTATE (LD) (LDH) ENZYME: CPT

## 2023-12-28 PROCEDURE — A9270 NON-COVERED ITEM OR SERVICE: HCPCS | Performed by: STUDENT IN AN ORGANIZED HEALTH CARE EDUCATION/TRAINING PROGRAM

## 2023-12-28 PROCEDURE — 700102 HCHG RX REV CODE 250 W/ 637 OVERRIDE(OP): Performed by: STUDENT IN AN ORGANIZED HEALTH CARE EDUCATION/TRAINING PROGRAM

## 2023-12-28 PROCEDURE — 80053 COMPREHEN METABOLIC PANEL: CPT

## 2023-12-28 PROCEDURE — 700111 HCHG RX REV CODE 636 W/ 250 OVERRIDE (IP): Mod: JZ | Performed by: STUDENT IN AN ORGANIZED HEALTH CARE EDUCATION/TRAINING PROGRAM

## 2023-12-28 PROCEDURE — 71045 X-RAY EXAM CHEST 1 VIEW: CPT

## 2023-12-28 PROCEDURE — 36415 COLL VENOUS BLD VENIPUNCTURE: CPT

## 2023-12-28 PROCEDURE — 85652 RBC SED RATE AUTOMATED: CPT

## 2023-12-28 RX ORDER — VALPROIC ACID 250 MG/5ML
125 SOLUTION ORAL 2 TIMES DAILY
Status: DISCONTINUED | OUTPATIENT
Start: 2023-12-28 | End: 2024-01-02 | Stop reason: HOSPADM

## 2023-12-28 RX ORDER — ONDANSETRON 2 MG/ML
4 INJECTION INTRAMUSCULAR; INTRAVENOUS EVERY 6 HOURS PRN
Status: DISCONTINUED | OUTPATIENT
Start: 2023-12-28 | End: 2023-12-28

## 2023-12-28 RX ORDER — LABETALOL HYDROCHLORIDE 5 MG/ML
10 INJECTION, SOLUTION INTRAVENOUS EVERY 4 HOURS PRN
Status: DISCONTINUED | OUTPATIENT
Start: 2023-12-28 | End: 2024-01-02 | Stop reason: HOSPADM

## 2023-12-28 RX ORDER — BISACODYL 10 MG
10 SUPPOSITORY, RECTAL RECTAL
Status: DISCONTINUED | OUTPATIENT
Start: 2023-12-28 | End: 2024-01-02 | Stop reason: HOSPADM

## 2023-12-28 RX ORDER — HYDROXYZINE HYDROCHLORIDE 25 MG/1
25 TABLET, FILM COATED ORAL 2 TIMES DAILY
Status: DISCONTINUED | OUTPATIENT
Start: 2023-12-28 | End: 2024-01-02 | Stop reason: HOSPADM

## 2023-12-28 RX ORDER — POLYETHYLENE GLYCOL 3350 17 G/17G
1 POWDER, FOR SOLUTION ORAL
Status: DISCONTINUED | OUTPATIENT
Start: 2023-12-28 | End: 2024-01-02 | Stop reason: HOSPADM

## 2023-12-28 RX ORDER — LEVOTHYROXINE SODIUM 0.12 MG/1
125 TABLET ORAL
Status: DISCONTINUED | OUTPATIENT
Start: 2023-12-29 | End: 2023-12-29

## 2023-12-28 RX ORDER — DEXAMETHASONE 6 MG/1
6 TABLET ORAL DAILY
Status: DISCONTINUED | OUTPATIENT
Start: 2023-12-28 | End: 2023-12-29

## 2023-12-28 RX ORDER — SODIUM CHLORIDE, SODIUM LACTATE, POTASSIUM CHLORIDE, AND CALCIUM CHLORIDE .6; .31; .03; .02 G/100ML; G/100ML; G/100ML; G/100ML
500 INJECTION, SOLUTION INTRAVENOUS
Status: DISCONTINUED | OUTPATIENT
Start: 2023-12-28 | End: 2024-01-02 | Stop reason: HOSPADM

## 2023-12-28 RX ORDER — GUAIFENESIN 600 MG/1
600 TABLET, EXTENDED RELEASE ORAL 2 TIMES DAILY PRN
Status: DISCONTINUED | OUTPATIENT
Start: 2023-12-28 | End: 2023-12-29

## 2023-12-28 RX ORDER — AMOXICILLIN 250 MG
2 CAPSULE ORAL 2 TIMES DAILY
Status: DISCONTINUED | OUTPATIENT
Start: 2023-12-28 | End: 2024-01-02 | Stop reason: HOSPADM

## 2023-12-28 RX ORDER — PHENOL 1.4 %
10 AEROSOL, SPRAY (ML) MUCOUS MEMBRANE EVERY EVENING
COMMUNITY

## 2023-12-28 RX ORDER — ACETAMINOPHEN 325 MG/1
650 TABLET ORAL EVERY 6 HOURS PRN
Status: DISCONTINUED | OUTPATIENT
Start: 2023-12-28 | End: 2024-01-02 | Stop reason: HOSPADM

## 2023-12-28 RX ORDER — LABETALOL HYDROCHLORIDE 5 MG/ML
10 INJECTION, SOLUTION INTRAVENOUS EVERY 4 HOURS PRN
Status: DISCONTINUED | OUTPATIENT
Start: 2023-12-28 | End: 2023-12-28

## 2023-12-28 RX ORDER — ENOXAPARIN SODIUM 100 MG/ML
40 INJECTION SUBCUTANEOUS DAILY
Status: DISCONTINUED | OUTPATIENT
Start: 2023-12-28 | End: 2023-12-28

## 2023-12-28 RX ORDER — DIVALPROEX SODIUM 125 MG/1
125 CAPSULE, COATED PELLETS ORAL 2 TIMES DAILY
COMMUNITY

## 2023-12-28 RX ORDER — ONDANSETRON 2 MG/ML
4 INJECTION INTRAMUSCULAR; INTRAVENOUS EVERY 4 HOURS PRN
Status: DISCONTINUED | OUTPATIENT
Start: 2023-12-28 | End: 2024-01-02 | Stop reason: HOSPADM

## 2023-12-28 RX ORDER — HYDROXYZINE HYDROCHLORIDE 25 MG/1
25 TABLET, FILM COATED ORAL 2 TIMES DAILY
COMMUNITY

## 2023-12-28 RX ORDER — DIVALPROEX SODIUM 125 MG/1
125 CAPSULE, COATED PELLETS ORAL 2 TIMES DAILY
Status: DISCONTINUED | OUTPATIENT
Start: 2023-12-28 | End: 2023-12-28

## 2023-12-28 RX ORDER — CHOLECALCIFEROL (VITAMIN D3) 125 MCG
10 CAPSULE ORAL EVERY EVENING
Status: DISCONTINUED | OUTPATIENT
Start: 2023-12-28 | End: 2024-01-02 | Stop reason: HOSPADM

## 2023-12-28 RX ORDER — ONDANSETRON 4 MG/1
4 TABLET, ORALLY DISINTEGRATING ORAL EVERY 6 HOURS PRN
Status: DISCONTINUED | OUTPATIENT
Start: 2023-12-28 | End: 2023-12-28

## 2023-12-28 RX ORDER — ENOXAPARIN SODIUM 100 MG/ML
40 INJECTION SUBCUTANEOUS DAILY
Status: DISCONTINUED | OUTPATIENT
Start: 2023-12-28 | End: 2024-01-02 | Stop reason: HOSPADM

## 2023-12-28 RX ORDER — ONDANSETRON 4 MG/1
4 TABLET, ORALLY DISINTEGRATING ORAL EVERY 4 HOURS PRN
Status: DISCONTINUED | OUTPATIENT
Start: 2023-12-28 | End: 2024-01-02 | Stop reason: HOSPADM

## 2023-12-28 RX ADMIN — VALPROIC ACID 125 MG: 500 SOLUTION ORAL at 19:22

## 2023-12-28 RX ADMIN — DEXAMETHASONE 6 MG: 4 TABLET ORAL at 19:19

## 2023-12-28 RX ADMIN — HYDROXYZINE HYDROCHLORIDE 25 MG: 25 TABLET, FILM COATED ORAL at 19:20

## 2023-12-28 RX ADMIN — Medication 10 MG: at 19:20

## 2023-12-28 RX ADMIN — ENOXAPARIN SODIUM 40 MG: 100 INJECTION SUBCUTANEOUS at 19:20

## 2023-12-28 ASSESSMENT — ENCOUNTER SYMPTOMS
COUGH: 1
BRUISES/BLEEDS EASILY: 0
HEARTBURN: 0
NAUSEA: 0
SHORTNESS OF BREATH: 1
DOUBLE VISION: 0
CHILLS: 1
BLURRED VISION: 0
DIZZINESS: 0
FOCAL WEAKNESS: 0
VOMITING: 0
PALPITATIONS: 0
DEPRESSION: 0
MYALGIAS: 1
FEVER: 1
ABDOMINAL PAIN: 0
WEAKNESS: 1

## 2023-12-28 ASSESSMENT — FIBROSIS 4 INDEX: FIB4 SCORE: 2.01

## 2023-12-28 ASSESSMENT — LIFESTYLE VARIABLES: SUBSTANCE_ABUSE: 0

## 2023-12-29 PROBLEM — U07.1 ACUTE HYPOXEMIC RESPIRATORY FAILURE DUE TO COVID-19 (HCC): Status: ACTIVE | Noted: 2023-09-25

## 2023-12-29 LAB
ERYTHROCYTE [SEDIMENTATION RATE] IN BLOOD BY WESTERGREN METHOD: 7 MM/HOUR (ref 0–25)
GLUCOSE BLD STRIP.AUTO-MCNC: 115 MG/DL (ref 65–99)
GLUCOSE BLD STRIP.AUTO-MCNC: 134 MG/DL (ref 65–99)
GLUCOSE BLD STRIP.AUTO-MCNC: 139 MG/DL (ref 65–99)
GLUCOSE BLD STRIP.AUTO-MCNC: 151 MG/DL (ref 65–99)
GLUCOSE BLD STRIP.AUTO-MCNC: 153 MG/DL (ref 65–99)
T4 FREE SERPL-MCNC: 0.94 NG/DL (ref 0.93–1.7)
TSH SERPL DL<=0.005 MIU/L-ACNC: 11.1 UIU/ML (ref 0.38–5.33)

## 2023-12-29 PROCEDURE — 700102 HCHG RX REV CODE 250 W/ 637 OVERRIDE(OP): Performed by: STUDENT IN AN ORGANIZED HEALTH CARE EDUCATION/TRAINING PROGRAM

## 2023-12-29 PROCEDURE — 84439 ASSAY OF FREE THYROXINE: CPT

## 2023-12-29 PROCEDURE — 82962 GLUCOSE BLOOD TEST: CPT | Mod: 91

## 2023-12-29 PROCEDURE — 700102 HCHG RX REV CODE 250 W/ 637 OVERRIDE(OP): Performed by: GENERAL PRACTICE

## 2023-12-29 PROCEDURE — 770001 HCHG ROOM/CARE - MED/SURG/GYN PRIV*

## 2023-12-29 PROCEDURE — 99232 SBSQ HOSP IP/OBS MODERATE 35: CPT | Performed by: GENERAL PRACTICE

## 2023-12-29 PROCEDURE — 36415 COLL VENOUS BLD VENIPUNCTURE: CPT

## 2023-12-29 PROCEDURE — A9270 NON-COVERED ITEM OR SERVICE: HCPCS | Performed by: STUDENT IN AN ORGANIZED HEALTH CARE EDUCATION/TRAINING PROGRAM

## 2023-12-29 PROCEDURE — A9270 NON-COVERED ITEM OR SERVICE: HCPCS | Performed by: GENERAL PRACTICE

## 2023-12-29 PROCEDURE — 96372 THER/PROPH/DIAG INJ SC/IM: CPT

## 2023-12-29 PROCEDURE — RXMED WILLOW AMBULATORY MEDICATION CHARGE: Performed by: GENERAL PRACTICE

## 2023-12-29 PROCEDURE — 700111 HCHG RX REV CODE 636 W/ 250 OVERRIDE (IP): Mod: JZ | Performed by: STUDENT IN AN ORGANIZED HEALTH CARE EDUCATION/TRAINING PROGRAM

## 2023-12-29 PROCEDURE — 84443 ASSAY THYROID STIM HORMONE: CPT

## 2023-12-29 RX ORDER — ALBUTEROL SULFATE 90 UG/1
2 AEROSOL, METERED RESPIRATORY (INHALATION) EVERY 4 HOURS PRN
Qty: 8.5 G | Refills: 0 | Status: SHIPPED | OUTPATIENT
Start: 2023-12-29

## 2023-12-29 RX ORDER — FAMOTIDINE 20 MG/1
20 TABLET, FILM COATED ORAL NIGHTLY
Qty: 14 TABLET | Refills: 0 | Status: SHIPPED | OUTPATIENT
Start: 2023-12-29 | End: 2024-01-16

## 2023-12-29 RX ORDER — DEXAMETHASONE 6 MG/1
6 TABLET ORAL DAILY
Status: DISCONTINUED | OUTPATIENT
Start: 2023-12-29 | End: 2023-12-31

## 2023-12-29 RX ORDER — LEVOTHYROXINE SODIUM 0.15 MG/1
150 TABLET ORAL
Qty: 30 TABLET | Refills: 0 | Status: SHIPPED | OUTPATIENT
Start: 2023-12-30 | End: 2024-02-01

## 2023-12-29 RX ORDER — GUAIFENESIN 600 MG/1
1200 TABLET, EXTENDED RELEASE ORAL EVERY 12 HOURS
Status: DISPENSED | OUTPATIENT
Start: 2023-12-29 | End: 2024-01-01

## 2023-12-29 RX ORDER — ALBUTEROL SULFATE 90 UG/1
2 AEROSOL, METERED RESPIRATORY (INHALATION) EVERY 4 HOURS PRN
Status: DISCONTINUED | OUTPATIENT
Start: 2023-12-29 | End: 2024-01-02 | Stop reason: HOSPADM

## 2023-12-29 RX ORDER — DEXAMETHASONE 6 MG/1
6 TABLET ORAL DAILY
Qty: 8 TABLET | Refills: 0 | Status: SHIPPED | OUTPATIENT
Start: 2023-12-30 | End: 2023-12-31

## 2023-12-29 RX ORDER — BENZONATATE 100 MG/1
100 CAPSULE ORAL 3 TIMES DAILY PRN
Status: DISCONTINUED | OUTPATIENT
Start: 2023-12-29 | End: 2024-01-02 | Stop reason: HOSPADM

## 2023-12-29 RX ADMIN — DOCUSATE SODIUM 50 MG AND SENNOSIDES 8.6 MG 2 TABLET: 8.6; 5 TABLET, FILM COATED ORAL at 18:48

## 2023-12-29 RX ADMIN — INSULIN HUMAN 1 UNITS: 100 INJECTION, SOLUTION PARENTERAL at 02:17

## 2023-12-29 RX ADMIN — VALPROIC ACID 125 MG: 500 SOLUTION ORAL at 18:49

## 2023-12-29 RX ADMIN — VALPROIC ACID 125 MG: 500 SOLUTION ORAL at 09:08

## 2023-12-29 RX ADMIN — DEXAMETHASONE 6 MG: 6 TABLET ORAL at 09:08

## 2023-12-29 RX ADMIN — DOCUSATE SODIUM 50 MG AND SENNOSIDES 8.6 MG 2 TABLET: 8.6; 5 TABLET, FILM COATED ORAL at 05:30

## 2023-12-29 RX ADMIN — INSULIN HUMAN 1 UNITS: 100 INJECTION, SOLUTION PARENTERAL at 18:56

## 2023-12-29 RX ADMIN — Medication 10 MG: at 18:48

## 2023-12-29 RX ADMIN — HYDROXYZINE HYDROCHLORIDE 25 MG: 25 TABLET, FILM COATED ORAL at 18:48

## 2023-12-29 RX ADMIN — HYDROXYZINE HYDROCHLORIDE 25 MG: 25 TABLET, FILM COATED ORAL at 05:31

## 2023-12-29 RX ADMIN — GUAIFENESIN 1200 MG: 600 TABLET, EXTENDED RELEASE ORAL at 09:07

## 2023-12-29 RX ADMIN — LEVOTHYROXINE SODIUM 125 MCG: 0.12 TABLET ORAL at 05:31

## 2023-12-29 ASSESSMENT — COGNITIVE AND FUNCTIONAL STATUS - GENERAL
SUGGESTED CMS G CODE MODIFIER MOBILITY: CK
DAILY ACTIVITIY SCORE: 20
TOILETING: A LITTLE
DRESSING REGULAR UPPER BODY CLOTHING: A LITTLE
WALKING IN HOSPITAL ROOM: A LITTLE
MOBILITY SCORE: 17
MOVING FROM LYING ON BACK TO SITTING ON SIDE OF FLAT BED: A LITTLE
HELP NEEDED FOR BATHING: A LITTLE
MOVING TO AND FROM BED TO CHAIR: A LITTLE
DRESSING REGULAR LOWER BODY CLOTHING: A LITTLE
CLIMB 3 TO 5 STEPS WITH RAILING: A LOT
SUGGESTED CMS G CODE MODIFIER DAILY ACTIVITY: CJ
STANDING UP FROM CHAIR USING ARMS: A LITTLE
TURNING FROM BACK TO SIDE WHILE IN FLAT BAD: A LITTLE

## 2023-12-29 ASSESSMENT — PATIENT HEALTH QUESTIONNAIRE - PHQ9
2. FEELING DOWN, DEPRESSED, IRRITABLE, OR HOPELESS: NOT AT ALL
1. LITTLE INTEREST OR PLEASURE IN DOING THINGS: NOT AT ALL
SUM OF ALL RESPONSES TO PHQ9 QUESTIONS 1 AND 2: 0

## 2023-12-29 ASSESSMENT — PAIN DESCRIPTION - PAIN TYPE
TYPE: ACUTE PAIN
TYPE: ACUTE PAIN

## 2023-12-29 NOTE — ED TRIAGE NOTES
"Chief Complaint   Patient presents with    Cough     Started 2 days ago. Pt tested covid + this morning. Pt O2 sat 85% on RA. Hx dementia. Baseline confusion.     Denies SOB or CP.    BP (!) 147/80   Pulse 70   Temp 36.7 °C (98 °F) (Temporal)   Resp 18   Ht 1.727 m (5' 8\")   Wt 72.6 kg (160 lb)   LMP 03/01/2005   SpO2 (!) 85%   BMI 24.33 kg/m²     "

## 2023-12-29 NOTE — ED NOTES
Med Rec complete per MAR from [facility]  Allergies reviewed  Antibiotics in the past 30 days:no  Anticoagulant in past 14 days:no  Pharmacy patient utilizes:[facility] on [address]

## 2023-12-29 NOTE — CARE PLAN
Problem: Knowledge Deficit - Standard  Goal: Patient and family/care givers will demonstrate understanding of plan of care, disease process/condition, diagnostic tests and medications  Outcome: Progressing     Problem: Hemodynamics  Goal: Patient's hemodynamics, fluid balance and neurologic status will be stable or improve  Outcome: Progressing     Problem: Fluid Volume  Goal: Fluid volume balance will be maintained  Outcome: Progressing     Problem: Urinary - Renal Perfusion  Goal: Ability to achieve and maintain adequate renal perfusion and functioning will improve  Outcome: Progressing       The patient is Watcher - Medium risk of patient condition declining or worsening    Shift Goals  Clinical Goals: fall precautions, safety, pulmonary hygiene  Patient Goals: rest  Family Goals: DAKOTA    Progress made toward(s) clinical / shift goals:  Telesitter ordered for pt safety. Pt reeducated on correct IS use. Bed alarm and sitter in place and use of call light reinforced.

## 2023-12-29 NOTE — ASSESSMENT & PLAN NOTE
Chest x-ray with no evidence of pulmonary infiltrate or effusion.    Procalcitonin negative.    On admission patient was quiring 4 L of oxygen, now on RA.  Supportive management.  Decadron last dose 01/1/2024

## 2023-12-29 NOTE — ED NOTES
Pt is resting comfortably in bed, respirations unlabored, bed rails up x 4   Fall precautions in place, bed alarm on, Pt is on 2 L oxygen via NC, call light within reach.

## 2023-12-29 NOTE — PROGRESS NOTES
Report received from Kelly MARTINEZ and care of patient assumed at 0700. Assessment complete. Patient has received scheduled medication regimen this morning. Telesitter remains in place for patient safety. Patient is tolerating 2L NC. Bed alarm remains in place and is active due to high fall risk.

## 2023-12-29 NOTE — ED PROVIDER NOTES
ED Provider Note    CHIEF COMPLAINT  Chief Complaint   Patient presents with    Cough     Started 2 days ago. Pt tested covid + this morning. Pt O2 sat 85% on RA. Hx dementia. Baseline confusion.       EXTERNAL RECORDS REVIEWED  Inpatient Notes discharge summary 9/28/2023 for level fall history of dementia    HPI/ROS  LIMITATION TO HISTORY   Select: Dementia  OUTSIDE HISTORIAN(S):    Gaye Antoine is a 83 y.o. female who presents with hypoxemia.  Patient had a cough that started 2 days ago.  Patient tested positive for COVID-19 this morning.  Patient denies any complaints at this time.  Patient denies chest pain, shortness of breath, fevers, chills, body aches, sweats, nausea, vomiting or diarrhea.  Patient is not on home oxygen at baseline.    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    SURGICAL HISTORY   has a past surgical history that includes ankle fusion (1/8/2009); hip cannulated screw (6/15/2009); other (2008); appendectomy (age 7); cholecystectomy; parathyroid exploration (2/22/2012); rhytidectomy; and mammoplasty reduction (2006).    FAMILY HISTORY  Family History   Problem Relation Age of Onset    Cancer Mother         breast    Lung Disease Mother         TB    Heart Disease Father         aneurysm    Genitourinary () Problems Sister         polycystic kidneys       SOCIAL HISTORY  Social History     Tobacco Use    Smoking status: Former     Current packs/day: 0.50     Average packs/day: 0.5 packs/day for 50.0 years  "(25.0 ttl pk-yrs)     Types: Cigarettes    Smokeless tobacco: Never    Tobacco comments:     Previous heavy 1 ppd smoker.    Vaping Use    Vaping Use: Never used   Substance and Sexual Activity    Alcohol use: Not Currently     Alcohol/week: 1.2 oz     Types: 2 Shots of liquor per week     Comment: rare    Drug use: No    Sexual activity: Not Currently     Comment:  2005       CURRENT MEDICATIONS  Home Medications       Reviewed by Jorge Arteaga R.N. (Registered Nurse) on 12/28/23 at 1631  Med List Status: Partial     Medication Last Dose Status   acetaminophen (TYLENOL) 325 MG Tab  Active   levothyroxine (SYNTHROID) 125 MCG Tab  Active                    ALLERGIES  Allergies   Allergen Reactions    Sulfa Drugs      Not sure       PHYSICAL EXAM  VITAL SIGNS: BP (!) 147/80   Pulse 70   Temp 36.7 °C (98 °F) (Temporal)   Resp 18   Ht 1.727 m (5' 8\")   Wt 72.6 kg (160 lb)   LMP 03/01/2005   SpO2 94%   BMI 24.33 kg/m²    Vitals and nursing note reviewed.   Constitutional:       Comments: Patient is lying in bed supine, pleasant, conversant, speaking in complete sentences   HENT:      Head: Normocephalic and atraumatic.   Eyes:      Extraocular Movements: Extraocular movements intact.      Conjunctiva/sclera: Conjunctivae normal.      Pupils: Pupils are equal, round, and reactive to light.   Cardiovascular:      Pulses: Normal pulses.      Comments: HR 70  Pulmonary:      Effort: Pulmonary effort is normal. No respiratory distress.   Abdominal:      Comments: Abdomen is soft, non-tender, non-distended, non-rigid, no rebound, guarding, masses, no McBurney's point tenderness, no peritoneal signs, negative Rovsing sign, negative Hawley sign.  No CVA tenderness to palpation. Benign abdomen.   Musculoskeletal:         General: No swelling. Normal range of motion.      Cervical back: Normal range of motion. No rigidity.   Skin:     General: Skin is warm and dry.      Capillary Refill: Capillary refill takes less " than 2 seconds.   Neurological:      Mental Status: Alert.  Moving all extremities.      DIAGNOSTIC STUDIES / PROCEDURES    LABS  No leukocytosis        COURSE & MEDICAL DECISION MAKING    INITIAL ASSESSMENT, COURSE AND PLAN  Care Narrative: CMP demonstrates no evidence of acute kidney injury, acute electrolyte abnormality, acute liver failure, CBC demonstrates no evidence of acute anemia or leukocytosis.  Patient hypoxemic, 85% on room air, requiring supplemental oxygen, 4 L.  Patient patient is breathing more comfortably at this time.  The patient will need to be admitted for supplemental oxygen for COVID-19.    This dictation has been created using voice recognition software. I am continuously working with the software to minimize the number of voice recognition errors and I have made every attempt to manually correct the errors within my dictation. However errors  related to this voice recognition software may still exist and should be interpreted within the appropriate context.     Electronically signed by: Vidal Menezes M.D., 12/28/2023 5:51 PM    CRITICAL CARE  The very real possibilty of a deterioration of this patient's condition required the highest level of my preparedness for sudden, emergent intervention.  I provided critical care services, which included medication orders, frequent reevaluations of the patient's condition and response to treatment, ordering and reviewing test results, and discussing the case with various consultants.  The critical care time associated with the care of the patient was 39 minutes. Review chart for interventions. This time is exclusive of any other billable procedures.       FINAL DIAGNOSIS  1. COVID-19    2. Acute respiratory failure with hypoxia (HCC)           Electronically signed by: Vidal Menezes M.D., 12/28/2023 5:47 PM

## 2023-12-29 NOTE — H&P
Hospital Medicine History & Physical Note    Date of Service  12/28/2023    Primary Care Physician  Kvng Browning M.D.    Consultants  None    Code Status  Full Code    Chief Complaint  Chief Complaint   Patient presents with    Cough     Started 2 days ago. Pt tested covid + this morning. Pt O2 sat 85% on RA. Hx dementia. Baseline confusion.       History of Presenting Illness  Gaye Antoine is a 83 y.o. female with history of hypothyroidism who presented 12/28/2023 with 2 days of URI symptoms.  Patient reported having cough, generalized weakness, subjective fever and chills.  Reported tested positive for COVID earlier today.  In ER, requiring 4 L nasal cannula support.  Admission requested by ERP.  Admitted to medicine service for further evaluation and treatment.    I discussed the plan of care with patient and bedside RN.    Review of Systems  Review of Systems   Constitutional:  Positive for chills, fever and malaise/fatigue.   HENT:  Negative for hearing loss and tinnitus.    Eyes:  Negative for blurred vision and double vision.   Respiratory:  Positive for cough and shortness of breath.    Cardiovascular:  Negative for chest pain and palpitations.   Gastrointestinal:  Negative for abdominal pain, heartburn, nausea and vomiting.   Genitourinary:  Negative for dysuria and urgency.   Musculoskeletal:  Positive for myalgias. Negative for joint pain.   Skin:  Negative for itching and rash.   Neurological:  Positive for weakness. Negative for dizziness and focal weakness.   Endo/Heme/Allergies:  Negative for environmental allergies. Does not bruise/bleed easily.   Psychiatric/Behavioral:  Negative for depression and substance abuse.    All other systems reviewed and are negative.      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    Surgical History   has a past surgical history that includes ankle fusion (1/8/2009); hip cannulated screw (6/15/2009); other (2008); appendectomy (age 7); cholecystectomy; parathyroid exploration (2/22/2012); rhytidectomy; and mammoplasty reduction (2006).     Family History  family history includes Cancer in her mother; Genitourinary () Problems in her sister; Heart Disease in her father; Lung Disease in her mother.   Family history reviewed with patient. There is no family history that is pertinent to the chief complaint.     Social History   reports that she has quit smoking. Her smoking use included cigarettes. She has a 25.0 pack-year smoking history. She has never used smokeless tobacco. She reports that she does not currently use alcohol after a past usage of about 1.2 oz of alcohol per week. She reports that she does not use drugs.    Allergies  Allergies   Allergen Reactions    Sulfa Drugs Unspecified     PER MAR       Medications  Prior to Admission Medications   Prescriptions Last Dose Informant Patient Reported? Taking?   acetaminophen (TYLENOL) 325 MG Tab   No No   Sig: Take 2 Tablets by mouth every 6 hours as needed for Moderate Pain, Mild Pain or Fever.   levothyroxine (SYNTHROID) 125 MCG Tab   No No   Sig: Take 1 Tablet by mouth every morning on an empty stomach.      Facility-Administered Medications: None       Physical Exam  Temp:  [36.7 °C (98 °F)] 36.7 °C (98 °F)  Pulse:  [55-71] 57  Resp:  [14-18] 18  BP: (129-158)/(80-88) 158/88  SpO2:  [85 %-96 %] 94 %  Blood Pressure : 133/84   Temperature: 36.7 °C (98 °F)   Pulse: 61   Respiration: 18   Pulse Oximetry: 94 %       Physical Exam  Vitals and  "nursing note reviewed.   Constitutional:       General: She is not in acute distress.  HENT:      Head: Normocephalic and atraumatic.      Nose: Nose normal.      Mouth/Throat:      Mouth: Mucous membranes are moist.      Pharynx: Oropharynx is clear.   Eyes:      General: No scleral icterus.     Extraocular Movements: Extraocular movements intact.   Cardiovascular:      Rate and Rhythm: Normal rate and regular rhythm.      Pulses: Normal pulses.      Heart sounds:      No friction rub.   Pulmonary:      Effort: No respiratory distress.      Breath sounds: Rales present. No wheezing.   Chest:      Chest wall: No tenderness.   Abdominal:      General: There is distension.      Tenderness: There is no abdominal tenderness. There is no guarding or rebound.   Musculoskeletal:         General: Normal range of motion.      Cervical back: Neck supple. No tenderness.      Right lower leg: No edema.      Left lower leg: No edema.   Skin:     General: Skin is warm and dry.      Capillary Refill: Capillary refill takes less than 2 seconds.   Neurological:      General: No focal deficit present.      Mental Status: She is alert and oriented to person, place, and time.   Psychiatric:         Mood and Affect: Mood normal.         Laboratory:  Recent Labs     12/28/23  1642   WBC 6.3   RBC 4.42   HEMOGLOBIN 13.2   HEMATOCRIT 40.0   MCV 90.5   MCH 29.9   MCHC 33.0   RDW 42.7   PLATELETCT 197   MPV 10.3     Recent Labs     12/28/23  1642   SODIUM 141   POTASSIUM 4.1   CHLORIDE 103   CO2 28   GLUCOSE 172*   BUN 21   CREATININE 0.71   CALCIUM 8.6     Recent Labs     12/28/23  1642   ALTSGPT 17   ASTSGOT 20   ALKPHOSPHAT 76   TBILIRUBIN 0.3   GLUCOSE 172*         No results for input(s): \"NTPROBNP\" in the last 72 hours.      No results for input(s): \"TROPONINT\" in the last 72 hours.    Imaging:  DX-CHEST-LIMITED (1 VIEW)   Final Result      1.  Minimal peripheral right hemithorax opacities could represent areas of pneumonitis, edema or " atelectasis.          X-Ray:  I have personally reviewed the images and compared with prior images.    Assessment/Plan:  Justification for Admission Status  I anticipate this patient will require at least 2 midnights hospitalization, therefore appropriate for inpatient status.      * Pneumonia due to COVID-19 virus- (present on admission)  Assessment & Plan  Tested positive outpatient 12/28  Wean off oxygen support as tolerated  Decadron 6 mg daily  Assess the need for remdesivir/baricitinib daily  Check procalcitonin-antibiotic if warranted    Acute respiratory failure with hypoxia (HCC)- (present on admission)  Assessment & Plan  Likely secondary to COVID pneumonia  Currently on 4 L-wean off as tolerated    Hyperglycemia  Assessment & Plan  ISS patient is on steroid    Mood disorder (HCC)  Assessment & Plan  Depakote, Atarax    S/P parathyroidectomy (HCC)- (present on admission)  Assessment & Plan  Per history    Hypothyroidism, postablative- (present on admission)  Assessment & Plan  Continue Synthroid        VTE prophylaxis: enoxaparin ppx

## 2023-12-29 NOTE — DISCHARGE PLANNING
Case Management Discharge Planning    Admission Date: 12/28/2023  GMLOS: 5  ALOS: 1    6-Clicks ADL Score: 20  6-Clicks Mobility Score: 17    Anticipated Discharge Dispo: Discharge Disposition: Discharged to home/self care (01)  [facility] [place] [address]    LMSW completed chart review.  Pt appears to live at [facility].   LMSW placed PTC to [facility] at [phone].  Left VM with call back number regarding calling back for more pt information.  Pt A&Ox1, confused at baseline, hx of dementia.    3496 Update  LMSW spoke with  regarding pts admission. Group home will be accepting this pt back to [facility] upon medical clearance, pending group [facility] rules regarding clearance from [facility] to risk exposure to other group [place] members.  LMSW left VM for pts sister Rashida.     3603 Update  LMSW spoke with sister Rashida regarding guardianship.  Rashida would like to become guardian and have pt assessed for cognitive functioning.   LMSW escalated to leadership and leadership states that would only be done IP if it was necessary for needing guardian.  LMSW left Rashida voicemail with this information.     Care Transition Team Assessment    Information Source  Orientation Level: Oriented to person, Disoriented to place, Disoriented to time, Disoriented to situation  Who is responsible for making decisions for patient? : Patient    Readmission Evaluation  Is this a readmission?: Yes - unplanned readmission    Elopement Risk  Legal Hold: No  Ambulatory or Self Mobile in Wheelchair: Yes  Disoriented: Place-At Risk for Elopement, Time-At Risk for Elopement, Situation-At Risk for Elopement  Psychiatric Symptoms: Impulsivity-at Risk for Elopement  Elopement this Admit: No  Vocalizing Wanting to Leave: No  Displays Behaviors, Body Language Wanting to Leave: No-Not at Risk for Elopement  Elopement Risk: Not at Risk for Elopement (telesitter)    Discharge Preparedness  What is your plan after  discharge?: Home with help  What are your discharge supports?: Sibling (Group Home Staff)  Prior Functional Level: Needs Assist with Activities of Daily Living, Needs Assist with Medication Management    Functional Assesment  Prior Functional Level: Needs Assist with Activities of Daily Living, Needs Assist with Medication Management    Finances  Financial Barriers to Discharge: No  Prescription Coverage: Yes    Advance Directive  Advance Directive?: None    Domestic Abuse  Have you ever been the victim of abuse or violence?: No    Discharge Risks or Barriers  Discharge risks or barriers?: Other (comment) (dementia)    Anticipated Discharge Information  Discharge Disposition: Discharged to home/self care (01)  Discharge Address: [address] [place] [address] [place] [id number]

## 2023-12-29 NOTE — PROGRESS NOTES
4 Eyes Skin Assessment Completed by MICHELLE Mendoza and MICHELLE Mane.    Head WDL  Ears WDL  Nose WDL  Mouth WDL  Neck WDL  Breast/Chest WDL  Shoulder Blades WDL  Spine WDL  (R) Arm/Elbow/Hand WDL  (L) Arm/Elbow/Hand WDL  Abdomen WDL  Groin Redness and Blanching  Scrotum/Coccyx/Buttocks Redness and Blanching  (R) Leg WDL  (L) Leg WDL  (R) Heel/Foot/Toe Dry, Callused  (L) Heel/Foot/Toe Dry, Callused          Devices In Places Blood Pressure Cuff, Pulse Ox, and Nasal Cannula      Interventions In Place Gray Ear Foams, NC W/Ear Foams, and Pillows    Possible Skin Injury No    Pictures Uploaded Into Epic N/A  Wound Consult Placed N/A  RN Wound Prevention Protocol Ordered No

## 2023-12-29 NOTE — HOSPITAL COURSE
This is an 83-year-old female with past medical history of hypothyroidism who was admitted on 12/28/2023 with shortness of breath, significant for acute hypoxemic respiratory failure secondary to COVID-19 pneumonia.    Chest x-ray with no evidence of pulmonary infiltrate or effusion.  Procalcitonin negative.  Patient started on Decadron.  Patient initially required 4 L of oxygen on admission, now back to baseline of room air. Supportive management.

## 2023-12-29 NOTE — FACE TO FACE
Face to Face Supporting Documentation - Home Health    The encounter with this patient was in whole or in part the primary reason for home health admission.    Date of encounter:   Patient:                    MRN:                       YOB: 2023  Gaye Antoine  [id number]  1940     Home health to see patient for:  Skilled Nursing care for assessment, interventions & education, Home health aide, Occupational therapy evaluation and treatment, and Speech Language Pathology evaluation and treatment    Skilled need for:  New Onset Medical Diagnosis COVID19    Skilled nursing interventions to include:  Comment: HH/PT/OT    Homebound status evidenced by:  Need the aid of supportive devices such as crutches, canes, wheelchairs or walkers. Leaving home requires a considerable and taxing effort. There is a normal inability to leave the home.    Community Physician to provide follow up care: Kvng Browning M.D.     Optional Interventions? No      I certify the face to face encounter for this home health care referral meets the CMS requirements and the encounter/clinical assessment with the patient was, in whole, or in part, for the medical condition(s) listed above, which is the primary reason for home health care. Based on my clinical findings: the service(s) are medically necessary, support the need for home health care, and the homebound criteria are met.  I certify that this patient has had a face to face encounter by myself.  Trang Marcelino D.O. - NPI: [id number]

## 2023-12-29 NOTE — PROGRESS NOTES
Report received from RN, assumed care when transferred to floor  Pt is A0X1, only oriented to self   Pt declines any SOB, chest pain, new onset of numbness/ tingling  Pt declines any pain at this time  Pt is incontinent of urine  Pt has + flatus, + bowel sounds, - BM, PTA  Pt ambulates with a x1 assist   Pt is tolerating a regular diet, pt denies any nausea/vomiting  Plan of care discussed, all questions answered. Explained importance of calling before getting OOB and pt verbalizes understanding. Explained importance of oral care. Call light is within reach, treaded slipper socks on, bed in lowest/ locked position, hourly rounding in place, all needs met at this time

## 2023-12-29 NOTE — CARE PLAN
The patient is Watcher - Medium risk of patient condition declining or worsening    Shift Goals  Clinical Goals: patient safety, pulmonary hygiene, maintain fall precautions  Patient Goals: rest  Family Goals: updates on POC    Progress made toward(s) clinical / shift goals:  Patient permitted administration of scheduled medications. Bed alarm remains in place for patient safety. Telesitter remains in place for patient safety. Patient is confused at baseline.     Patient is not progressing towards the following goals: Pending discharge clearance.     Problem: Knowledge Deficit - Standard  Goal: Patient and family/care givers will demonstrate understanding of plan of care, disease process/condition, diagnostic tests and medications  Outcome: Progressing     Problem: Respiratory  Goal: Patient will achieve/maintain optimum respiratory ventilation and gas exchange  Outcome: Progressing     Problem: Mechanical Ventilation  Goal: Patient will be able to express needs and understand communication  Outcome: Progressing     Problem: Fall Risk  Goal: Patient will remain free from falls  Outcome: Progressing     Problem: Mechanical Ventilation  Goal: Safe management of artificial airway and ventilation  Outcome: Met  Goal: Successful weaning off mechanical ventilator, spontaneously maintains adequate gas exchange  Outcome: Met

## 2023-12-29 NOTE — ASSESSMENT & PLAN NOTE
Tested positive outpatient 12/28  Wean off oxygen support as tolerated  Decadron 6 mg daily  Assess the need for remdesivir/baricitinib daily  Check procalcitonin-antibiotic if warranted

## 2023-12-30 LAB — GLUCOSE BLD STRIP.AUTO-MCNC: 84 MG/DL (ref 65–99)

## 2023-12-30 PROCEDURE — 770001 HCHG ROOM/CARE - MED/SURG/GYN PRIV*

## 2023-12-30 PROCEDURE — 97163 PT EVAL HIGH COMPLEX 45 MIN: CPT

## 2023-12-30 PROCEDURE — A9270 NON-COVERED ITEM OR SERVICE: HCPCS | Performed by: STUDENT IN AN ORGANIZED HEALTH CARE EDUCATION/TRAINING PROGRAM

## 2023-12-30 PROCEDURE — 97166 OT EVAL MOD COMPLEX 45 MIN: CPT

## 2023-12-30 PROCEDURE — 82962 GLUCOSE BLOOD TEST: CPT

## 2023-12-30 PROCEDURE — 700102 HCHG RX REV CODE 250 W/ 637 OVERRIDE(OP): Performed by: GENERAL PRACTICE

## 2023-12-30 PROCEDURE — A9270 NON-COVERED ITEM OR SERVICE: HCPCS | Performed by: GENERAL PRACTICE

## 2023-12-30 PROCEDURE — 700102 HCHG RX REV CODE 250 W/ 637 OVERRIDE(OP): Performed by: STUDENT IN AN ORGANIZED HEALTH CARE EDUCATION/TRAINING PROGRAM

## 2023-12-30 PROCEDURE — 99232 SBSQ HOSP IP/OBS MODERATE 35: CPT | Performed by: GENERAL PRACTICE

## 2023-12-30 RX ADMIN — VALPROIC ACID 125 MG: 500 SOLUTION ORAL at 18:04

## 2023-12-30 RX ADMIN — LEVOTHYROXINE SODIUM 150 MCG: 0.12 TABLET ORAL at 06:09

## 2023-12-30 RX ADMIN — HYDROXYZINE HYDROCHLORIDE 25 MG: 25 TABLET, FILM COATED ORAL at 06:10

## 2023-12-30 RX ADMIN — GUAIFENESIN 1200 MG: 600 TABLET, EXTENDED RELEASE ORAL at 06:09

## 2023-12-30 RX ADMIN — DEXAMETHASONE 6 MG: 6 TABLET ORAL at 06:10

## 2023-12-30 RX ADMIN — DOCUSATE SODIUM 50 MG AND SENNOSIDES 8.6 MG 2 TABLET: 8.6; 5 TABLET, FILM COATED ORAL at 06:09

## 2023-12-30 RX ADMIN — HYDROXYZINE HYDROCHLORIDE 25 MG: 25 TABLET, FILM COATED ORAL at 18:04

## 2023-12-30 RX ADMIN — GUAIFENESIN 1200 MG: 600 TABLET, EXTENDED RELEASE ORAL at 18:04

## 2023-12-30 RX ADMIN — Medication 10 MG: at 18:04

## 2023-12-30 RX ADMIN — VALPROIC ACID 125 MG: 500 SOLUTION ORAL at 08:51

## 2023-12-30 ASSESSMENT — COGNITIVE AND FUNCTIONAL STATUS - GENERAL
SUGGESTED CMS G CODE MODIFIER DAILY ACTIVITY: CK
CLIMB 3 TO 5 STEPS WITH RAILING: A LOT
MOBILITY SCORE: 13
TURNING FROM BACK TO SIDE WHILE IN FLAT BAD: UNABLE
DRESSING REGULAR UPPER BODY CLOTHING: A LITTLE
SUGGESTED CMS G CODE MODIFIER MOBILITY: CL
DAILY ACTIVITIY SCORE: 16
MOVING TO AND FROM BED TO CHAIR: UNABLE
DRESSING REGULAR LOWER BODY CLOTHING: A LOT
STANDING UP FROM CHAIR USING ARMS: A LITTLE
TOILETING: A LITTLE
MOVING FROM LYING ON BACK TO SITTING ON SIDE OF FLAT BED: A LITTLE
PERSONAL GROOMING: A LITTLE
WALKING IN HOSPITAL ROOM: A LITTLE
EATING MEALS: A LITTLE
HELP NEEDED FOR BATHING: A LOT

## 2023-12-30 ASSESSMENT — PAIN DESCRIPTION - PAIN TYPE
TYPE: ACUTE PAIN

## 2023-12-30 ASSESSMENT — ACTIVITIES OF DAILY LIVING (ADL): TOILETING: REQUIRES ASSIST

## 2023-12-30 ASSESSMENT — GAIT ASSESSMENTS
DISTANCE (FEET): 10
DEVIATION: SHUFFLED GAIT
GAIT LEVEL OF ASSIST: CONTACT GUARD ASSIST
ASSISTIVE DEVICE: FRONT WHEEL WALKER

## 2023-12-30 NOTE — CARE PLAN
The patient is Stable - Low risk of patient condition declining or worsening    Shift Goals  Clinical Goals: safety, skin integirty, and FS  Patient Goals: Rest  Family Goals: updates on POC    Progress made toward(s) clinical / shift goals:  Safety maintained with tele sitter, frequent rounding, and bed alarm on. Skin integrity maintained with frequent repositioning and ambulation. FS in place and medicating per MAR. Pt slept intermittently throughout shift.     Problem: Hemodynamics  Goal: Patient's hemodynamics, fluid balance and neurologic status will be stable or improve  Outcome: Progressing     Problem: Self Care  Goal: Patient will have the ability to perform ADLs independently or with assistance (bathe, groom, dress, toilet and feed)  Outcome: Progressing

## 2023-12-30 NOTE — THERAPY
Occupational Therapy   Initial Evaluation     Patient Name: Gaye Antoine  Age:  83 y.o., Sex:  female  Medical Record #: [id number]  Today's Date: 12/30/2023     Precautions  Precautions: Fall Risk  Comments: poor motor planning and initiation    Assessment  Patient is 83 y.o. female with a diagnosis of shortness of breath, diagnosed with COVID and respiratory failure. Pt with a hx of dementia and mood disorder; lives in group home per chart. Level of assistance available is unknown. Additional factors influencing patient status / progress: Pt is demonstrating decreased safety and poor initiation and motor planning with familiar activities. Pt can benefit from acute OT to increase independence in ADLS. At current level of function, pt will need post acute OT placement unless group home can provide 24/7 assistance and supervision for all ADLS/functional mobility. If 24/7 assistance available, pt could go home with  OT.      Plan    Occupational Therapy Initial Treatment Plan   Treatment Interventions: Self Care / Activities of Daily Living, Neuro Re-Education / Balance, Therapeutic Activity  Treatment Frequency: 3 Times per Week  Duration: Until Therapy Goals Met    DC Equipment Recommendations: Unable to determine at this time  Discharge Recommendations: Recommend post-acute placement for additional occupational therapy services prior to discharge home        12/30/23 1124   Prior Living Situation   Prior Services Unable To Determine At This Time   Housing / Facility 1 Story House   Steps Into Home   (unable to state; group home per chart)   Bathroom Set up   (unable to state)   Lives with - Patient's Self Care Capacity Attendant / Paid Care Giver  (group home per chart)   Comments Pt poor historian and unable to answer most PLOF questions; per chart resides in group home   Prior Level of ADL Function   Self Feeding Requires Assist   Grooming / Hygiene Requires Assist   Bathing Requires Assist   Dressing  Requires Assist   Toileting Requires Assist   Prior Level of IADL Function   Medication Management Requires Assist   Laundry Requires Assist   Kitchen Mobility Requires Assist   Finances Requires Assist   Home Management Requires Assist   Shopping Requires Assist   Prior Level Of Mobility Supervision Without Device in Home   Driving / Transportation Relatives / Others Provide Transportation   History of Falls   History of Falls   (unable to determine)   Precautions   Precautions Fall Risk   Comments poor motor planning and initiation   Pain   Pain Scales 0 to 10 Scale    Intervention Ambulation / Increased Activity   Pain 0 - 10 Group   Therapist Pain Assessment Post Activity Pain Same as Prior to Activity;Nurse Notified;0   Cognition    Cognition / Consciousness X   Level of Consciousness Alert   Ability To Follow Commands 1 Step   Attention Impaired   Sequencing Impaired   Initiation Impaired   Comments Hx dementia; difficulty initiating simple tasks like lifting feet to position tray table or putting arms in sleeves of gown   Passive ROM Upper Body   Passive ROM Upper Body WDL   Active ROM Upper Body   Active ROM Upper Body  WDL   Strength Upper Body   Upper Body Strength  WDL   Sensation Upper Body   Upper Extremity Sensation  WDL   Upper Body Muscle Tone   Upper Body Muscle Tone  WDL   Neurological Concerns   Neurological Concerns Yes  (hx dementia)   Coordination Upper Body   Coordination X   Comments Impacted by motor planning/sequencing deficits   Balance Assessment   Sitting Balance (Static) Fair +   Sitting Balance (Dynamic) Fair +   Standing Balance (Static) Fair   Standing Balance (Dynamic) Fair   Weight Shift Sitting Fair   Weight Shift Standing Fair   Comments using FWW   Bed Mobility    Supine to Sit Minimal Assist   Scooting Minimal Assist   ADL Assessment   Eating Minimal Assist  (verbal cues for each bite to remember to eat/initiate eating)   Grooming Minimal Assist   Upper Body Dressing Minimal  Assist   Lower Body Dressing Moderate Assist   Toileting Moderate Assist   Comments assist to sequence tasks and initiate movements   Functional Mobility   Sit to Stand Contact Guard Assist   Bed, Chair, Wheelchair Transfer Minimal Assist   Toilet Transfers Moderate Assist   Transfer Method Stand Step  (Mod a for correct positioning and initiation)   Comments using FWW and physical promptinig   Activity Tolerance   Sitting in Chair Left up in chair   Sitting Edge of Bed 10 min   Standing 10 min total   Patient / Family Goals   Patient / Family Goal #1 None stated   Short Term Goals   Short Term Goal # 1 Xfer to chair or toilet at supervised level of assist   Short Term Goal # 2 UB/LB dressing with supervision   Occupational Therapy Initial Treatment Plan    Treatment Interventions Self Care / Activities of Daily Living;Neuro Re-Education / Balance;Therapeutic Activity   Treatment Frequency 3 Times per Week   Duration Until Therapy Goals Met   Problem List   Problem List Decreased Active Daily Living Skills;Decreased Activity Tolerance;Decreased Functional Mobility;Impaired Postural Control / Balance;Safety Awareness Deficits / Cognition   Anticipated Discharge Equipment and Recommendations   DC Equipment Recommendations Unable to determine at this time   Discharge Recommendations Recommend post-acute placement for additional occupational therapy services prior to discharge home

## 2023-12-30 NOTE — CARE PLAN
The patient is Stable - Low risk of patient condition declining or worsening    Shift Goals  Clinical Goals: skin integrity preservation, patient safety, oxygen weaning; IS use  Patient Goals: rest  Family Goals: [place]    Progress made toward(s) clinical / shift goals:  Patient cooperated with physical and occupational therapies today. Patient is also tolerating sitting up in chair (chair alarm in place; telesitter camera in room). Patient has been weaned to 1 liter supplemental oxygen via NC.     Patient is not progressing towards the following goals: Pending discharge clearance.     Problem: Knowledge Deficit - Standard  Goal: Patient and family/care givers will demonstrate understanding of plan of care, disease process/condition, diagnostic tests and medications  Outcome: Progressing     Problem: Hemodynamics  Goal: Patient's hemodynamics, fluid balance and neurologic status will be stable or improve  Outcome: Progressing     Problem: Fluid Volume  Goal: Fluid volume balance will be maintained  Outcome: Progressing     Problem: Respiratory  Goal: Patient will achieve/maintain optimum respiratory ventilation and gas exchange  Outcome: Progressing     Problem: Physical Regulation  Goal: Diagnostic test results will improve  Outcome: Progressing  Goal: Signs and symptoms of infection will decrease  Outcome: Progressing     Problem: Fall Risk  Goal: Patient will remain free from falls  Outcome: Progressing

## 2023-12-30 NOTE — THERAPY
Physical Therapy   Initial Evaluation     Patient Name: Gaye Antoine  Age:  83 y.o., Sex:  female  Medical Record #: [id number]  Today's Date: 12/30/2023     Precautions  Precautions: Fall Risk    Assessment  Patient is 83 y.o. female admitted with PNA due to COVID 19, cough, hypoxia. PMH of hypothyroidism and dementia. Pt presents to physical therapy requiring Min to CGA to complete mobility as detailed below. Pt demonstrated impaired task sequencing and delayed initiation requiring Min hand over hand assist to complete supine to sit and functional transfers. PT to trail benefit of therapy interventions to improve functional mobility independence and decrease caregiver burden.     Plan    Physical Therapy Initial Treatment Plan   Treatment Plan : Bed Mobility, Equipment, Gait Training, Neuro Re-Education / Balance, Self Care / Home Evaluation, Therapeutic Activities, Therapeutic Exercise  Treatment Frequency: 3 Times per Week  Duration: Until Therapy Goals Met       Discharge Recommendations:  Anticipate pt is near PLOF baseline. May benefit from HH therapy to improve functional carry over in familiar environment.     12/30/23 1106   Vitals   O2 (LPM) 1   O2 Delivery Device Nasal Cannula   Pain 0 - 10 Group   Therapist Pain Assessment During Activity;Nurse Notified  (no pain reported)   Prior Living Situation   Prior Services Unable To Determine At This Time   Comments unable to determine as pt with dementia. Per EMR, pt resides in a group home. Per pt, pt resides with her spouse in a [facility]   Prior Level of Functional Mobility   Bed Mobility Unable To Determine At This Time   Cognition    Level of Consciousness Alert   Comments hx of dementia.   Active ROM Lower Body    Active ROM Lower Body  WDL   Strength Lower Body   Lower Body Strength  WDL   Comments adequate for functional mobility   Balance Assessment   Sitting Balance (Static) Fair +   Sitting Balance (Dynamic) Fair +   Standing Balance (Static) Fair    Standing Balance (Dynamic) Fair   Weight Shift Sitting Fair   Weight Shift Standing Fair   Comments w/ FWW   Bed Mobility    Supine to Sit Minimal Assist   Sit to Supine   (seated in chair at end of session)   Scooting Minimal Assist   Rolling Minimum Assist to Lt.   Comments required hand over hand prompting to sequence OOB mobility   Gait Analysis   Gait Level Of Assist Contact Guard Assist   Assistive Device Front Wheel Walker   Distance (Feet) 10   # of Times Distance was Traveled 2   Deviation Shuffled Gait   Weight Bearing Status no restrictions   Comments EOB <> toilet   Functional Mobility   Sit to Stand Contact Guard Assist   Bed, Chair, Wheelchair Transfer Minimal Assist   Comments verbal and tactile cues to sequence transfer to bedside chair   How much difficulty does the patient currently have...   Turning over in bed (including adjusting bedclothes, sheets and blankets)? 1   Sitting down on and standing up from a chair with arms (e.g., wheelchair, bedside commode, etc.) 3   Moving from lying on back to sitting on the side of the bed? 1   How much help from another person does the patient currently need...   Moving to and from a bed to a chair (including a wheelchair)? 3   Need to walk in a hospital room? 3   Climbing 3-5 steps with a railing? 2   6 clicks Mobility Score 13   Short Term Goals    Short Term Goal # 1 pt will perform supine <> sit wtih HOB flat, no railing and SPV in6 visits   Short Term Goal # 2 pt will perform sit <> stand and functional transfers with LRAD and SPV to improve mobility independence in 6 visits   Short Term Goal # 3 pt will ambulate > 50 ft with LRAD and SPV to access home environment in 6 visits   Education Group   Education Provided Role of Physical Therapist   Role of Physical Therapist Patient Response Patient;Acceptance;Explanation;No Learning Evidence   Physical Therapy Initial Treatment Plan    Treatment Plan  Bed Mobility;Equipment;Gait Training;Neuro Re-Education  / Balance;Self Care / Home Evaluation;Therapeutic Activities;Therapeutic Exercise   Treatment Frequency 3 Times per Week   Duration Until Therapy Goals Met   Problem List    Problems Impaired Transfers;Impaired Bed Mobility;Impaired Ambulation;Decreased Activity Tolerance;Motor Planning / Sequencing   Anticipated Discharge Equipment and Recommendations   Discharge Recommendations   (Anticipate pt is near PLOF baseline. May benefit from HH therapy to improve functional carry over in familiar environment.)

## 2023-12-30 NOTE — PROGRESS NOTES
Bedside report received, assessment completed    A&O x  2, pt calls appropriately  Mobility: Up with SBA and FWW  Fall Risk Assessment: High, bed alarm on, door notifications in use  Pain Assessment / Reassessment completed, medication provided per MAR  Diet: Regular  LDA:   IV Access: 20 R FA, CDI/ flushed/ SL    GI/: + void, + flatus, 12/29 BM  DVT Prophylaxis: Lovenox, SCD's refused     Reviewed plan of care with patient, bed in lowest position and locked, pt resting comfortably now, call light within reach, all needs met at this time. Interventions will be executed per plan of care

## 2023-12-30 NOTE — PROGRESS NOTES
Hospital Medicine Daily Progress Note    Date of Service  12/29/2023    Chief Complaint  Gaye Antoine is a 83 y.o. female admitted 12/28/2023 with shortness of breath    Hospital Course  This is an 83-year-old female with past medical history of hypothyroidism who was admitted on 12/28/2023 with shortness of breath, significant for acute hypoxemic respiratory failure secondary to COVID-19 pneumonia.    Chest x-ray with no evidence of pulmonary infiltrate or effusion.  Procalcitonin negative.  Patient started on Decadron.  On admission patient was quiring 4 L of oxygen, now down to 2 L of oxygen.  Supportive management.    Interval Problem Update  Patient resting comfortably in bed.  No coughing.  Oxygen demand decreased 4 L down to 2 L, continue to wean off as tolerated.  Continue Decadron.    Patient is medically cleared to return back to group home, however due to positive COVID-19, unable to return back until covid recovered.    Case management to follow.  Home health ordered.    I have discussed this patient's plan of care and discharge plan at IDT rounds today with Case Management, Nursing, Nursing leadership, and other members of the IDT team.    Consultants/Specialty  None    Code Status  Full Code    Disposition  The patient is medically cleared for discharge to home or a post-acute facility.  Anticipate discharge to: home with organized home healthcare and close outpatient follow-up    I have placed the appropriate orders for post-discharge needs.    Review of Systems  Review of Systems   All other systems reviewed and are negative.       Physical Exam  Temp:  [36.5 °C (97.7 °F)-36.8 °C (98.2 °F)] 36.8 °C (98.2 °F)  Pulse:  [57-74] 69  Resp:  [16-18] 18  BP: (127-158)/(88-99) 135/93  SpO2:  [89 %-95 %] 95 %    Physical Exam  Vitals and nursing note reviewed.   Constitutional:       General: She is not in acute distress.     Appearance: Normal appearance.   HENT:      Head: Normocephalic and atraumatic.       Mouth/Throat:      Mouth: Mucous membranes are moist.      Pharynx: No oropharyngeal exudate.   Eyes:      Extraocular Movements: Extraocular movements intact.      Pupils: Pupils are equal, round, and reactive to light.   Cardiovascular:      Rate and Rhythm: Normal rate and regular rhythm.      Pulses: Normal pulses.      Heart sounds: No murmur heard.     No friction rub. No gallop.   Pulmonary:      Effort: Pulmonary effort is normal. No respiratory distress.      Breath sounds: No wheezing, rhonchi or rales.   Abdominal:      General: Bowel sounds are normal. There is no distension.      Palpations: Abdomen is soft. There is no mass.      Tenderness: There is no abdominal tenderness.   Musculoskeletal:         General: No swelling or tenderness. Normal range of motion.      Cervical back: Normal range of motion. No rigidity. No muscular tenderness.      Right lower leg: No edema.      Left lower leg: No edema.   Skin:     General: Skin is warm and dry.      Capillary Refill: Capillary refill takes less than 2 seconds.      Findings: No erythema or rash.   Neurological:      General: No focal deficit present.      Mental Status: She is alert and oriented to person, place, and time.      Motor: No weakness.      Gait: Gait normal.         Fluids    Intake/Output Summary (Last 24 hours) at 12/29/2023 1822  Last data filed at 12/29/2023 1000  Gross per 24 hour   Intake 360 ml   Output --   Net 360 ml       Laboratory  Recent Labs     12/28/23  1642   WBC 6.3   RBC 4.42   HEMOGLOBIN 13.2   HEMATOCRIT 40.0   MCV 90.5   MCH 29.9   MCHC 33.0   RDW 42.7   PLATELETCT 197   MPV 10.3     Recent Labs     12/28/23  1642   SODIUM 141   POTASSIUM 4.1   CHLORIDE 103   CO2 28   GLUCOSE 172*   BUN 21   CREATININE 0.71   CALCIUM 8.6                   Imaging  DX-CHEST-LIMITED (1 VIEW)   Final Result      1.  Minimal peripheral right hemithorax opacities could represent areas of pneumonitis, edema or atelectasis.            Assessment/Plan  * Acute hypoxemic respiratory failure due to COVID-19 (HCC)- (present on admission)  Assessment & Plan  Chest x-ray with no evidence of pulmonary infiltrate or effusion.    Procalcitonin negative.    Patient started on Decadron.    On admission patient was quiring 4 L of oxygen, now down to 2 L of oxygen.    Supportive management.    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 In review of yesterday's note (12/28/2023), there are no changes except as documented above.

## 2023-12-31 PROCEDURE — 700111 HCHG RX REV CODE 636 W/ 250 OVERRIDE (IP): Mod: JZ | Performed by: STUDENT IN AN ORGANIZED HEALTH CARE EDUCATION/TRAINING PROGRAM

## 2023-12-31 PROCEDURE — 770001 HCHG ROOM/CARE - MED/SURG/GYN PRIV*

## 2023-12-31 PROCEDURE — A9270 NON-COVERED ITEM OR SERVICE: HCPCS | Performed by: STUDENT IN AN ORGANIZED HEALTH CARE EDUCATION/TRAINING PROGRAM

## 2023-12-31 PROCEDURE — 99232 SBSQ HOSP IP/OBS MODERATE 35: CPT | Performed by: GENERAL PRACTICE

## 2023-12-31 PROCEDURE — 700102 HCHG RX REV CODE 250 W/ 637 OVERRIDE(OP): Performed by: GENERAL PRACTICE

## 2023-12-31 PROCEDURE — 700102 HCHG RX REV CODE 250 W/ 637 OVERRIDE(OP): Performed by: STUDENT IN AN ORGANIZED HEALTH CARE EDUCATION/TRAINING PROGRAM

## 2023-12-31 PROCEDURE — A9270 NON-COVERED ITEM OR SERVICE: HCPCS | Performed by: GENERAL PRACTICE

## 2023-12-31 RX ORDER — LEVOTHYROXINE SODIUM 0.15 MG/1
150 TABLET ORAL
Qty: 30 TABLET | Refills: 0 | Status: SHIPPED | OUTPATIENT
Start: 2024-01-01 | End: 2024-01-01

## 2023-12-31 RX ORDER — DEXAMETHASONE 6 MG/1
6 TABLET ORAL DAILY
Status: COMPLETED | OUTPATIENT
Start: 2024-01-01 | End: 2024-01-01

## 2023-12-31 RX ADMIN — DOCUSATE SODIUM 50 MG AND SENNOSIDES 8.6 MG 2 TABLET: 8.6; 5 TABLET, FILM COATED ORAL at 05:57

## 2023-12-31 RX ADMIN — ENOXAPARIN SODIUM 40 MG: 100 INJECTION SUBCUTANEOUS at 18:43

## 2023-12-31 RX ADMIN — HYDROXYZINE HYDROCHLORIDE 25 MG: 25 TABLET, FILM COATED ORAL at 05:57

## 2023-12-31 RX ADMIN — Medication 10 MG: at 18:42

## 2023-12-31 RX ADMIN — GUAIFENESIN 1200 MG: 600 TABLET, EXTENDED RELEASE ORAL at 05:57

## 2023-12-31 RX ADMIN — LEVOTHYROXINE SODIUM 150 MCG: 0.12 TABLET ORAL at 05:57

## 2023-12-31 RX ADMIN — DEXAMETHASONE 6 MG: 6 TABLET ORAL at 05:57

## 2023-12-31 RX ADMIN — VALPROIC ACID 125 MG: 500 SOLUTION ORAL at 05:57

## 2023-12-31 RX ADMIN — VALPROIC ACID 125 MG: 500 SOLUTION ORAL at 18:42

## 2023-12-31 RX ADMIN — HYDROXYZINE HYDROCHLORIDE 25 MG: 25 TABLET, FILM COATED ORAL at 18:42

## 2023-12-31 RX ADMIN — GUAIFENESIN 1200 MG: 600 TABLET, EXTENDED RELEASE ORAL at 18:42

## 2023-12-31 ASSESSMENT — PAIN DESCRIPTION - PAIN TYPE
TYPE: ACUTE PAIN

## 2023-12-31 NOTE — PROGRESS NOTES
Hospital Medicine Daily Progress Note    Date of Service  12/30/2023    Chief Complaint  Gaye Antoine is a 83 y.o. female admitted 12/28/2023 with shortness of breath    Hospital Course  This is an 83-year-old female with past medical history of hypothyroidism who was admitted on 12/28/2023 with shortness of breath, significant for acute hypoxemic respiratory failure secondary to COVID-19 pneumonia.    Chest x-ray with no evidence of pulmonary infiltrate or effusion.  Procalcitonin negative.  Patient started on Decadron.  On admission patient was quiring 4 L of oxygen, now down to 2 L of oxygen.  Supportive management.    Interval Problem Update  Patient resting comfortably in bed.  No coughing.  Oxygen demand decreased 4 L down to 1 L, continue to wean off as tolerated.  Continue Decadron.    Patient is medically cleared to return back to group home, however due to positive COVID-19, unable to return back until covid recovered.    Case management to follow.  Home health ordered.    I have discussed this patient's plan of care and discharge plan at IDT rounds today with Case Management, Nursing, Nursing leadership, and other members of the IDT team.    Consultants/Specialty  None    Code Status  Full Code    Disposition  The patient is medically cleared for discharge to home or a post-acute facility.  Anticipate discharge to: home with organized home healthcare and close outpatient follow-up    I have placed the appropriate orders for post-discharge needs.    Review of Systems  Review of Systems   All other systems reviewed and are negative.       Physical Exam  Temp:  [36.7 °C (98.1 °F)-37.1 °C (98.8 °F)] (P) 36.7 °C (98.1 °F)  Pulse:  [61-71] (P) 61  Resp:  [12-18] (P) 12  BP: (129-136)/(84-93) (P) 146/88  SpO2:  [90 %-95 %] (P) 91 %    Physical Exam  Vitals and nursing note reviewed.   Constitutional:       General: She is not in acute distress.     Appearance: Normal appearance.   HENT:      Head:  Normocephalic and atraumatic.      Mouth/Throat:      Mouth: Mucous membranes are moist.      Pharynx: No oropharyngeal exudate.   Eyes:      Extraocular Movements: Extraocular movements intact.      Pupils: Pupils are equal, round, and reactive to light.   Cardiovascular:      Rate and Rhythm: Normal rate and regular rhythm.      Pulses: Normal pulses.      Heart sounds: No murmur heard.     No friction rub. No gallop.   Pulmonary:      Effort: Pulmonary effort is normal. No respiratory distress.      Breath sounds: No wheezing, rhonchi or rales.   Abdominal:      General: Bowel sounds are normal. There is no distension.      Palpations: Abdomen is soft. There is no mass.      Tenderness: There is no abdominal tenderness.   Musculoskeletal:         General: No swelling or tenderness. Normal range of motion.      Cervical back: Normal range of motion. No rigidity. No muscular tenderness.      Right lower leg: No edema.      Left lower leg: No edema.   Skin:     General: Skin is warm and dry.      Capillary Refill: Capillary refill takes less than 2 seconds.      Findings: No erythema or rash.   Neurological:      General: No focal deficit present.      Mental Status: She is alert and oriented to person, place, and time.      Motor: No weakness.      Gait: Gait normal.         Fluids    Intake/Output Summary (Last 24 hours) at 12/30/2023 1649  Last data filed at 12/29/2023 2100  Gross per 24 hour   Intake 120 ml   Output --   Net 120 ml       Laboratory  Recent Labs     12/28/23  1642   WBC 6.3   RBC 4.42   HEMOGLOBIN 13.2   HEMATOCRIT 40.0   MCV 90.5   MCH 29.9   MCHC 33.0   RDW 42.7   PLATELETCT 197   MPV 10.3     Recent Labs     12/28/23  1642   SODIUM 141   POTASSIUM 4.1   CHLORIDE 103   CO2 28   GLUCOSE 172*   BUN 21   CREATININE 0.71   CALCIUM 8.6                   Imaging  DX-CHEST-LIMITED (1 VIEW)   Final Result      1.  Minimal peripheral right hemithorax opacities could represent areas of pneumonitis, edema  or atelectasis.           Assessment/Plan  * Acute hypoxemic respiratory failure due to COVID-19 (HCC)- (present on admission)  Assessment & Plan  Chest x-ray with no evidence of pulmonary infiltrate or effusion.    Procalcitonin negative.    On admission patient was quiring 4 L of oxygen, now down to 1 L of oxygen.    Supportive management.  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 In review of yesterday's note (12/29/2023), there are no changes except as documented above.

## 2023-12-31 NOTE — PROGRESS NOTES
Bedside report received, assessment completed     A&O x  2, pt calls appropriately  Mobility: Up with x1 assist and FWW  Fall Risk Assessment: High, bed alarm on, door notifications in use, tele sitter in place  Pain Assessment / Reassessment completed, medication provided per MAR  Diet: Regular  LDA:   IV Access: 20 R FA, CDI/ flushed/ SL     GI/: + void, + flatus, 12/30 BM  DVT Prophylaxis: Lovenox, SCD's refused      Reviewed plan of care with patient, bed in lowest position and locked, pt resting comfortably now, call light within reach, all needs met at this time. Interventions will be executed per plan of care

## 2023-12-31 NOTE — PROGRESS NOTES
Hospital Medicine Daily Progress Note    Date of Service  12/31/2023    Chief Complaint  Gaye Antoine is a 83 y.o. female admitted 12/28/2023 with shortness of breath    Hospital Course  This is an 83-year-old female with past medical history of hypothyroidism who was admitted on 12/28/2023 with shortness of breath, significant for acute hypoxemic respiratory failure secondary to COVID-19 pneumonia.    Chest x-ray with no evidence of pulmonary infiltrate or effusion.  Procalcitonin negative.  Patient started on Decadron.  Patient initially required 4 L of oxygen on admission, now back to baseline of room air. Supportive management.    Interval Problem Update  Patient initially required 4 L of oxygen on admission, now back to baseline of room air.    Patient is medically cleared to return back to group home, however due to positive COVID-19, unable to return back until covid recovered.    Case management to follow.  Home health ordered.    I have discussed this patient's plan of care and discharge plan at IDT rounds today with Case Management, Nursing, Nursing leadership, and other members of the IDT team.    Consultants/Specialty  None    Code Status  Full Code    Disposition  The patient is medically cleared for discharge to home or a post-acute facility.  Anticipate discharge to: home with close outpatient follow-up    I have placed the appropriate orders for post-discharge needs.    Review of Systems  Review of Systems   All other systems reviewed and are negative.       Physical Exam  Temp:  [36.6 °C (97.8 °F)-36.8 °C (98.2 °F)] 36.8 °C (98.2 °F)  Pulse:  [58-62] 58  Resp:  [14-17] 17  BP: (119-141)/(82-96) 119/88  SpO2:  [91 %-95 %] 91 %    Physical Exam  Vitals and nursing note reviewed.   Constitutional:       General: She is not in acute distress.     Appearance: Normal appearance.   HENT:      Head: Normocephalic and atraumatic.      Mouth/Throat:      Mouth: Mucous membranes are moist.      Pharynx:  No oropharyngeal exudate.   Eyes:      Extraocular Movements: Extraocular movements intact.      Pupils: Pupils are equal, round, and reactive to light.   Cardiovascular:      Rate and Rhythm: Normal rate and regular rhythm.      Pulses: Normal pulses.      Heart sounds: No murmur heard.     No friction rub. No gallop.   Pulmonary:      Effort: Pulmonary effort is normal. No respiratory distress.      Breath sounds: No wheezing, rhonchi or rales.   Abdominal:      General: Bowel sounds are normal. There is no distension.      Palpations: Abdomen is soft. There is no mass.      Tenderness: There is no abdominal tenderness.   Musculoskeletal:         General: No swelling or tenderness. Normal range of motion.      Cervical back: Normal range of motion. No rigidity. No muscular tenderness.      Right lower leg: No edema.      Left lower leg: No edema.   Skin:     General: Skin is warm and dry.      Capillary Refill: Capillary refill takes less than 2 seconds.      Findings: No erythema or rash.   Neurological:      General: No focal deficit present.      Mental Status: She is alert and oriented to person, place, and time.      Motor: No weakness.      Gait: Gait normal.         Fluids    Intake/Output Summary (Last 24 hours) at 12/31/2023 1424  Last data filed at 12/30/2023 2000  Gross per 24 hour   Intake 120 ml   Output --   Net 120 ml       Laboratory  Recent Labs     12/28/23  1642   WBC 6.3   RBC 4.42   HEMOGLOBIN 13.2   HEMATOCRIT 40.0   MCV 90.5   MCH 29.9   MCHC 33.0   RDW 42.7   PLATELETCT 197   MPV 10.3     Recent Labs     12/28/23  1642   SODIUM 141   POTASSIUM 4.1   CHLORIDE 103   CO2 28   GLUCOSE 172*   BUN 21   CREATININE 0.71   CALCIUM 8.6                   Imaging  DX-CHEST-LIMITED (1 VIEW)   Final Result      1.  Minimal peripheral right hemithorax opacities could represent areas of pneumonitis, edema or atelectasis.           Assessment/Plan  * Acute hypoxemic respiratory failure due to COVID-19  ([facility])- (present on admission)  Assessment & Plan  Chest x-ray with no evidence of pulmonary infiltrate or effusion.    Procalcitonin negative.    On admission patient was quiring 4 L of oxygen, now on RA.  Supportive management.  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 In review of yesterday's note (12/30/2023), there are no changes except as documented above.

## 2023-12-31 NOTE — CARE PLAN
The patient is Stable - Low risk of patient condition declining or worsening    Shift Goals  Clinical Goals: Pulmonary hygiene, skin integrity, and rest  Patient Goals: rest  Family Goals: DAKOTA    Progress made toward(s) clinical / shift goals:  Pulmonary hygiene with encouragement of IS and ambulation in room. Skin integrity maintained with barrier paste and barrier wipes. Pt slept intermittently throughout shift.     Problem: Knowledge Deficit - Standard  Goal: Patient and family/care givers will demonstrate understanding of plan of care, disease process/condition, diagnostic tests and medications  Outcome: Progressing     Problem: Hemodynamics  Goal: Patient's hemodynamics, fluid balance and neurologic status will be stable or improve  Outcome: Progressing

## 2024-01-01 PROCEDURE — 700102 HCHG RX REV CODE 250 W/ 637 OVERRIDE(OP): Performed by: STUDENT IN AN ORGANIZED HEALTH CARE EDUCATION/TRAINING PROGRAM

## 2024-01-01 PROCEDURE — A9270 NON-COVERED ITEM OR SERVICE: HCPCS | Performed by: GENERAL PRACTICE

## 2024-01-01 PROCEDURE — 302172 SOFT BED BELT: Performed by: GENERAL PRACTICE

## 2024-01-01 PROCEDURE — 770001 HCHG ROOM/CARE - MED/SURG/GYN PRIV*

## 2024-01-01 PROCEDURE — 700111 HCHG RX REV CODE 636 W/ 250 OVERRIDE (IP): Mod: JZ | Performed by: STUDENT IN AN ORGANIZED HEALTH CARE EDUCATION/TRAINING PROGRAM

## 2024-01-01 PROCEDURE — A9270 NON-COVERED ITEM OR SERVICE: HCPCS | Performed by: STUDENT IN AN ORGANIZED HEALTH CARE EDUCATION/TRAINING PROGRAM

## 2024-01-01 PROCEDURE — 99231 SBSQ HOSP IP/OBS SF/LOW 25: CPT | Performed by: GENERAL PRACTICE

## 2024-01-01 PROCEDURE — 700102 HCHG RX REV CODE 250 W/ 637 OVERRIDE(OP): Performed by: GENERAL PRACTICE

## 2024-01-01 RX ORDER — HALOPERIDOL 5 MG/ML
5 INJECTION INTRAMUSCULAR EVERY 6 HOURS PRN
Status: DISCONTINUED | OUTPATIENT
Start: 2024-01-01 | End: 2024-01-01

## 2024-01-01 RX ORDER — QUETIAPINE FUMARATE 25 MG/1
25 TABLET, FILM COATED ORAL 2 TIMES DAILY PRN
Status: DISCONTINUED | OUTPATIENT
Start: 2024-01-01 | End: 2024-01-02 | Stop reason: HOSPADM

## 2024-01-01 RX ADMIN — ENOXAPARIN SODIUM 40 MG: 100 INJECTION SUBCUTANEOUS at 17:12

## 2024-01-01 RX ADMIN — HYDROXYZINE HYDROCHLORIDE 25 MG: 25 TABLET, FILM COATED ORAL at 17:12

## 2024-01-01 RX ADMIN — VALPROIC ACID 125 MG: 500 SOLUTION ORAL at 05:29

## 2024-01-01 RX ADMIN — VALPROIC ACID 125 MG: 500 SOLUTION ORAL at 17:12

## 2024-01-01 RX ADMIN — Medication 10 MG: at 20:06

## 2024-01-01 RX ADMIN — DEXAMETHASONE 6 MG: 6 TABLET ORAL at 05:29

## 2024-01-01 RX ADMIN — DOCUSATE SODIUM 50 MG AND SENNOSIDES 8.6 MG 2 TABLET: 8.6; 5 TABLET, FILM COATED ORAL at 17:12

## 2024-01-01 RX ADMIN — LEVOTHYROXINE SODIUM 150 MCG: 0.12 TABLET ORAL at 05:29

## 2024-01-01 RX ADMIN — QUETIAPINE FUMARATE 25 MG: 25 TABLET ORAL at 16:17

## 2024-01-01 RX ADMIN — HYDROXYZINE HYDROCHLORIDE 25 MG: 25 TABLET, FILM COATED ORAL at 05:29

## 2024-01-01 ASSESSMENT — PAIN SCALES - PAIN ASSESSMENT IN ADVANCED DEMENTIA (PAINAD)
CONSOLABILITY: NO NEED TO CONSOLE
BODYLANGUAGE: RELAXED
TOTALSCORE: 0
BREATHING: NORMAL
FACIALEXPRESSION: SMILING OR INEXPRESSIVE

## 2024-01-01 ASSESSMENT — PAIN DESCRIPTION - PAIN TYPE
TYPE: ACUTE PAIN

## 2024-01-01 NOTE — CARE PLAN
The patient is Stable - Low risk of patient condition declining or worsening    Shift Goals  Clinical Goals: Safety  Patient Goals: Rest    Progress made toward(s) clinical / shift goals:  Patient safety resumed with telesitter in place, lap belt started per orders for frequent OOB and high fall risk.    Problem: Knowledge Deficit - Standard  Goal: Patient and family/care givers will demonstrate understanding of plan of care, disease process/condition, diagnostic tests and medications  Outcome: Progressing     Problem: Fall Risk  Goal: Patient will remain free from falls  Outcome: Progressing     Problem: Safety - Medical Restraint  Goal: Remains free of injury from restraints (Restraint for Interference with Medical Device)  Outcome: Progressing       Patient is not progressing towards the following goals:

## 2024-01-01 NOTE — PROGRESS NOTES
Received report from previous shift RN.  Assessment complete.  A&O x 1, disoriented to place, time and situation. Telesitter in place.  Patient ambulates with standby assist and FWW. Bed alarm on.   Patient denies SOB.  Patient denies pain at this time.  Denies N&V. Tolerating regular diet.  Skin per flowsheets.  + void, + flatus, last BM 12/31.  Call light and personal belongings with in reach. Hourly rounding in place. All needs met at this time.

## 2024-01-01 NOTE — CARE PLAN
Problem: Knowledge Deficit - Standard  Goal: Patient and family/care givers will demonstrate understanding of plan of care, disease process/condition, diagnostic tests and medications  Outcome: Progressing     Problem: Fluid Volume  Goal: Fluid volume balance will be maintained  Outcome: Progressing     Problem: Respiratory  Goal: Patient will achieve/maintain optimum respiratory ventilation and gas exchange  Outcome: Progressing     Problem: Fall Risk  Goal: Patient will remain free from falls  Outcome: Progressing     Problem: Safety - Medical Restraint  Goal: Remains free of injury from restraints (Restraint for Interference with Medical Device)  Outcome: Progressing  Goal: Free from restraint(s) (Restraint for Interference with Medical Device)  Outcome: Progressing     Problem: Safety - Medical Restraint  Goal: Free from restraint(s) (Restraint for Interference with Medical Device)  Outcome: Progressing   The patient is Stable - Low risk of patient condition declining or worsening    Shift Goals  Clinical Goals: Safety  Patient Goals: Rest  Family Goals: DAKOTA    Progress made toward(s) clinical / shift goals:  safety, reorient, monitor VS, mobility, optimize pulmonary hygiene    Patient is not progressing towards the following goals:

## 2024-01-01 NOTE — PROGRESS NOTES
Hospital Medicine Daily Progress Note    Date of Service  1/1/2024    Chief Complaint  Gaye Antoine is a 83 y.o. female admitted 12/28/2023 with shortness of breath    Hospital Course  This is an 83-year-old female with past medical history of hypothyroidism who was admitted on 12/28/2023 with shortness of breath, significant for acute hypoxemic respiratory failure secondary to COVID-19 pneumonia.    Chest x-ray with no evidence of pulmonary infiltrate or effusion.  Procalcitonin negative.  Patient started on Decadron.  Patient initially required 4 L of oxygen on admission, now back to baseline of room air. Supportive management.    Interval Problem Update  Patient initially required 4 L of oxygen on admission, now back to baseline of room air.    Patient is medically cleared to return back to group home, however due to positive COVID-19, unable to return back until covid recovered.    Case management to follow.  Home health ordered.    I have discussed this patient's plan of care and discharge plan at IDT rounds today with Case Management, Nursing, Nursing leadership, and other members of the IDT team.    Consultants/Specialty  None    Code Status  Full Code    Disposition  The patient is medically cleared for discharge to home or a post-acute facility.  Anticipate discharge to: home with organized home healthcare and close outpatient follow-up    I have placed the appropriate orders for post-discharge needs.    Review of Systems  Review of Systems   All other systems reviewed and are negative.       Physical Exam  Temp:  [36.3 °C (97.3 °F)-36.7 °C (98.1 °F)] 36.5 °C (97.7 °F)  Pulse:  [55-80] 80  Resp:  [17-18] 17  BP: (112-160)/() 112/87  SpO2:  [90 %-94 %] 94 %    Physical Exam  Vitals and nursing note reviewed.   Constitutional:       General: She is not in acute distress.     Appearance: Normal appearance.   HENT:      Head: Normocephalic and atraumatic.      Mouth/Throat:      Mouth: Mucous  membranes are moist.      Pharynx: No oropharyngeal exudate.   Eyes:      Extraocular Movements: Extraocular movements intact.      Pupils: Pupils are equal, round, and reactive to light.   Cardiovascular:      Rate and Rhythm: Normal rate and regular rhythm.      Pulses: Normal pulses.      Heart sounds: No murmur heard.     No friction rub. No gallop.   Pulmonary:      Effort: Pulmonary effort is normal. No respiratory distress.      Breath sounds: No wheezing, rhonchi or rales.   Abdominal:      General: Bowel sounds are normal. There is no distension.      Palpations: Abdomen is soft. There is no mass.      Tenderness: There is no abdominal tenderness.   Musculoskeletal:         General: No swelling or tenderness. Normal range of motion.      Cervical back: Normal range of motion. No rigidity. No muscular tenderness.      Right lower leg: No edema.      Left lower leg: No edema.   Skin:     General: Skin is warm and dry.      Capillary Refill: Capillary refill takes less than 2 seconds.      Findings: No erythema or rash.   Neurological:      General: No focal deficit present.      Mental Status: She is alert and oriented to person, place, and time.      Motor: No weakness.      Gait: Gait normal.         Fluids    Intake/Output Summary (Last 24 hours) at 1/1/2024 1342  Last data filed at 1/1/2024 0800  Gross per 24 hour   Intake 240 ml   Output --   Net 240 ml       Laboratory                            Imaging  DX-CHEST-LIMITED (1 VIEW)   Final Result      1.  Minimal peripheral right hemithorax opacities could represent areas of pneumonitis, edema or atelectasis.           Assessment/Plan  * Acute hypoxemic respiratory failure due to COVID-19 (HCC)- (present on admission)  Assessment & Plan  Chest x-ray with no evidence of pulmonary infiltrate or effusion.    Procalcitonin negative.    On admission patient was quiring 4 L of oxygen, now on RA.  Supportive management.  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 In review of yesterday's note (12/31/2023), there are no changes except as documented above.

## 2024-01-02 ENCOUNTER — PHARMACY VISIT (OUTPATIENT)
Dept: PHARMACY | Facility: MEDICAL CENTER | Age: 84
End: 2024-01-02
Payer: COMMERCIAL

## 2024-01-02 VITALS
SYSTOLIC BLOOD PRESSURE: 121 MMHG | WEIGHT: 160 LBS | RESPIRATION RATE: 18 BRPM | OXYGEN SATURATION: 90 % | DIASTOLIC BLOOD PRESSURE: 83 MMHG | TEMPERATURE: 97.9 F | HEART RATE: 56 BPM | HEIGHT: 68 IN | BODY MASS INDEX: 24.25 KG/M2

## 2024-01-02 PROBLEM — U07.1 PNEUMONIA DUE TO COVID-19 VIRUS: Status: ACTIVE | Noted: 2024-01-02

## 2024-01-02 PROBLEM — J12.82 PNEUMONIA DUE TO COVID-19 VIRUS: Status: RESOLVED | Noted: 2024-01-02 | Resolved: 2024-01-02

## 2024-01-02 PROBLEM — U07.1 ACUTE HYPOXEMIC RESPIRATORY FAILURE DUE TO COVID-19 (HCC): Status: RESOLVED | Noted: 2023-09-25 | Resolved: 2024-01-02

## 2024-01-02 PROBLEM — U07.1 PNEUMONIA DUE TO COVID-19 VIRUS: Status: RESOLVED | Noted: 2024-01-02 | Resolved: 2024-01-02

## 2024-01-02 PROBLEM — J96.01 ACUTE HYPOXEMIC RESPIRATORY FAILURE DUE TO COVID-19 (HCC): Status: RESOLVED | Noted: 2023-09-25 | Resolved: 2024-01-02

## 2024-01-02 PROBLEM — J12.82 PNEUMONIA DUE TO COVID-19 VIRUS: Status: ACTIVE | Noted: 2024-01-02

## 2024-01-02 LAB
BACTERIA BLD CULT: NORMAL
BACTERIA BLD CULT: NORMAL
SIGNIFICANT IND 70042: NORMAL
SIGNIFICANT IND 70042: NORMAL
SITE SITE: NORMAL
SITE SITE: NORMAL
SOURCE SOURCE: NORMAL
SOURCE SOURCE: NORMAL

## 2024-01-02 PROCEDURE — A9270 NON-COVERED ITEM OR SERVICE: HCPCS | Performed by: STUDENT IN AN ORGANIZED HEALTH CARE EDUCATION/TRAINING PROGRAM

## 2024-01-02 PROCEDURE — 700102 HCHG RX REV CODE 250 W/ 637 OVERRIDE(OP): Performed by: GENERAL PRACTICE

## 2024-01-02 PROCEDURE — 99239 HOSP IP/OBS DSCHRG MGMT >30: CPT | Performed by: STUDENT IN AN ORGANIZED HEALTH CARE EDUCATION/TRAINING PROGRAM

## 2024-01-02 PROCEDURE — A9270 NON-COVERED ITEM OR SERVICE: HCPCS | Performed by: GENERAL PRACTICE

## 2024-01-02 PROCEDURE — 700102 HCHG RX REV CODE 250 W/ 637 OVERRIDE(OP): Performed by: STUDENT IN AN ORGANIZED HEALTH CARE EDUCATION/TRAINING PROGRAM

## 2024-01-02 RX ORDER — QUETIAPINE FUMARATE 25 MG/1
12.5 TABLET, FILM COATED ORAL 2 TIMES DAILY PRN
Qty: 30 TABLET | Refills: 0 | Status: SHIPPED | OUTPATIENT
Start: 2024-01-02

## 2024-01-02 RX ADMIN — VALPROIC ACID 125 MG: 500 SOLUTION ORAL at 05:21

## 2024-01-02 RX ADMIN — LEVOTHYROXINE SODIUM 150 MCG: 0.12 TABLET ORAL at 05:21

## 2024-01-02 RX ADMIN — HYDROXYZINE HYDROCHLORIDE 25 MG: 25 TABLET, FILM COATED ORAL at 05:21

## 2024-01-02 ASSESSMENT — PAIN DESCRIPTION - PAIN TYPE
TYPE: ACUTE PAIN
TYPE: ACUTE PAIN

## 2024-01-02 NOTE — DISCHARGE PLANNING
Case Management Discharge Planning    Admission Date: 12/28/2023  GMLOS: 5  ALOS: 5    6-Clicks ADL Score: 16  6-Clicks Mobility Score: 13    Anticipated Discharge Dispo: Discharge Disposition: Discharged to home/self care (01)  Discharge Address: [facility] [place] [place] [address]    DME Needed: No    Action(s) Taken: LMSW spoke with pts Yisel. Yisel states they use [facility] , LMSW faxed form to [facility] with [facility]  choice.   LMSW arranged transport for pt to DC to  at 1330.   made aware.     Escalations Completed: None    Medically Clear: Yes    Next Steps: DC pt to     Barriers to Discharge: Transportation    Is the patient up for discharge tomorrow: No, today.

## 2024-01-02 NOTE — DISCHARGE SUMMARY
Discharge Summary    CHIEF COMPLAINT ON ADMISSION  Chief Complaint   Patient presents with    Cough     Started 2 days ago. Pt tested covid + this morning. Pt O2 sat 85% on RA. Hx dementia. Baseline confusion.       Reason for Admission  Pneumonia due to COVID-19 virus with acute respiratory failure with hypoxia    Admission Date  12/28/2023    CODE STATUS  Full Code    HPI & HOSPITAL COURSE  Gaye Antoine is an 83-year-old female with past medical history of hypothyroidism who was admitted on 12/28/2023 with shortness of breath, significant for acute hypoxemic respiratory failure secondary to COVID-19 pneumonia.    Chest x-ray with no evidence of pulmonary infiltrate or effusion.  Procalcitonin negative.  Patient started on Decadron.  Patient initially required 4 L of oxygen on admission, which was weaned off back to baseline of room air.  She continued to improve with supportive management.  She was treated with quetiapine for intermittent agitation.  She was alert and orient x 2 on the day of discharge.  Patient was discharged back to her group home.    Therefore, she is discharged in good and stable condition to home with organized home healthcare and close outpatient follow-up.    The patient met 2-midnight criteria for an inpatient stay at the time of discharge.    Discharge Date  12/2/2023    FOLLOW UP ITEMS POST DISCHARGE  -Follow-up with primary care provider in 3 to 5 days.    DISCHARGE DIAGNOSES  Principal Problem (Resolved):    Acute hypoxemic respiratory failure due to COVID-19 (HCC) (POA: Yes)  Active Problems:    Hypothyroidism, postablative (POA: Yes)    S/P parathyroidectomy (HCC) (POA: Yes)      Overview: single adenoma    Mood disorder (HCC) (POA: Yes)    Hyperglycemia (POA: Yes)  Resolved Problems:    Pneumonia due to COVID-19 virus (POA: Unknown)      FOLLOW UP  No future appointments.  Kvng Browning M.D.  [address] [place] [address] [address] [place] [id number] [phone]    Follow up        MEDICATIONS ON  DISCHARGE     Medication List        Start taking these medications        Instructions   albuterol 108 (90 Base) MCG/ACT Aers inhalation aerosol   Inhale 2 Puffs every four hours as needed for Shortness of Breath.  Dose: 2 Puff     famotidine 20 MG Tabs  Commonly known as: Pepcid   Take 1 Tablet by mouth every evening for 14 days.  Dose: 20 mg     QUEtiapine 25 MG Tabs  Commonly known as: SEROquel   Take 0.5 Tablets by mouth 2 times a day as needed (Agitation).  Dose: 12.5 mg            Change how you take these medications        Instructions   levothyroxine 150 MCG Tabs  What changed:   medication strength  how much to take  Commonly known as: Synthroid   Take 1 Tablet by mouth every morning on an empty stomach for 30 days.  Dose: 150 mcg            Continue taking these medications        Instructions   divalproex 125 MG Csdr  Commonly known as: Depakote Sprinkle   Take 125 mg by mouth 2 times a day.  Dose: 125 mg     hydrOXYzine HCl 25 MG Tabs  Commonly known as: Atarax   Take 25 mg by mouth 2 times a day.  Dose: 25 mg     Melatonin 10 MG Tabs   Take 10 mg by mouth every evening.  Dose: 10 mg              Allergies  Allergies   Allergen Reactions    Sulfa Drugs Unspecified     PER MAR       DIET  Orders Placed This Encounter   Procedures    Diet Order Diet: Regular     Standing Status:   Standing     Number of Occurrences:   1     Order Specific Question:   Diet:     Answer:   Regular [1]       ACTIVITY  As tolerated.  Weight bearing as tolerated    CONSULTATIONS  None    PROCEDURES  None     LABORATORY  Lab Results   Component Value Date    SODIUM 141 12/28/2023    POTASSIUM 4.1 12/28/2023    CHLORIDE 103 12/28/2023    CO2 28 12/28/2023    GLUCOSE 172 (H) 12/28/2023    BUN 21 12/28/2023    CREATININE 0.71 12/28/2023    CREATININE 0.8 12/31/2008        Lab Results   Component Value Date    WBC 6.3 12/28/2023    HEMOGLOBIN 13.2 12/28/2023    HEMATOCRIT 40.0 12/28/2023    PLATELETCT 197 12/28/2023        Total  time of the discharge process 32 minutes.

## 2024-01-02 NOTE — PROGRESS NOTES
Received report from previous shift RN  Assessment complete.  A&O x 1. Patient calls appropriately.  Patient ambulates with x1 assist and FWW. Bed alarm on.Telesitter in place  Patient has 2/10 pain. Pain managed with prescribed medications.  Denies N&V. Tolerating diet.  Skin per flowsheets.  + void, + flatus, + BM.  Patient denies SOB.  SCD's refused.  Patient pleasant and cooperative with plan of care.  Review plan with of care with patient. Call light and personal belongings with in reach. Hourly rounding in place. All needs met at this time.

## 2024-01-02 NOTE — DISCHARGE PLANNING
DC Transport Scheduled    Received request at: 1/2/2024 at 1022    Transport Company Scheduled:  [employer]  Spoke with Tye at [facility] to schedule transport.    Scheduled Date: 1/2/2024  Scheduled Time: 1330    Destination: Home at [facility] at [address] [facility] [address] [place]     Notified care team of scheduled transport via Voalte.     If there are any changes needed to the DC transportation scheduled, please contact [facility] at ext. [phone] between the hours of [phone] Mon-Fri. If outside those hours, contact the ED Case Manager at ext. [phone].

## 2024-01-02 NOTE — DISCHARGE PLANNING
0848  Per LSW request.  Agency/Facility Name: [facility]  Sent Referral at: 0848 am    1010  DPA received faxed choice form(s). DPA placed choice in Pt Media file.

## 2024-01-02 NOTE — PROGRESS NOTES
Patient discharged to group home with [facility] and staff escort. IV discontinued. Prescriptions given to patient, verbalized understanding, consent signed and in chart. Discharge instructions given regarding meds, follow up, and level of activity.  Education provided, patient asked questions and verbalized understanding. Discharge paperwork signed and in chart. Patient is tolerating diet, stable when ambulating, and pain is well controlled. All belongings collected. No further questions or concerns at this time.

## 2024-01-02 NOTE — PROGRESS NOTES
Received report from previous shift RN.  Assessment complete.  A&O x 3, disoriented to time. Telesitter in place.  Patient ambulates with standby assist and FWW. Bed alarm on.   Patient denies SOB.  Patient denies pain at this time.  Denies N&V. Tolerating regular diet.  Skin per flowsheets  + void, + flatus, last BM 12/31.  Patient calm and cooperative with plan of care.  Call light and personal belongings with in reach. Hourly rounding in place. All needs met at this time.

## 2024-01-03 ENCOUNTER — PATIENT OUTREACH (OUTPATIENT)
Dept: MEDICAL GROUP | Age: 84
End: 2024-01-03
Payer: MEDICARE

## 2024-01-03 NOTE — PROGRESS NOTES
Transitional Care Management  TCM Outreach Date and Time: Filed (1/3/2024  3:26 PM)    Discharge Questions  Actual Discharge Date: 01/02/24  Now that you are home, how are you feeling?: Fair (Sister states patient is a little bit better; a bit more oriented.  No further cough or fever)  Did you receive any new prescriptions?: Yes  Were you able to get them filled?: Yes  Meds to Bed or Pharmacy filled?: Pharmacy  Do you have any questions about your current medications or new medications (Review Med Rec)?: No  Did you have any durable medical equipment ordered?: Yes (Had O2 for prn prior to admission)  Did you receive it?: Yes  Do you have a follow up appointment scheduled with your PCP?: No  Was an appointment scheduled for the patient?: No  Any issues or paperwork you wish to discuss with your PCP?: No  Are you (patient) able to get to the appointment?: Yes (Pt sees a physician (Medical Director MD) at [facility], her group home)  Reason not scheduled?: Declines  If Home Health was ordered, have they contacted you (Patient): Yes ([facility])  Did you have enough support after your last discharge?: Yes  Does this patient qualify for the CCM program?: No    Transitional Care  Number of attempts made to contact patient: 1  Current or previous attempts competed within two business days of discharge? : Yes  Provided education regarding treatment plan, medications, self-management, ADLs?: No  Has patient completed an Advanced Directive?: No  Has the Care Manager's phone number provided?: No  Is there anything else I can help you with?: No    Discharge Summary  Chief Complaint: COVID test positive; Cough; Confusion  Admitting Diagnosis: SOB; Acute hypoxemia  Discharge Diagnosis: Acute hypoxemic respiratory failure due to COVID

## 2024-05-31 ENCOUNTER — HOSPITAL ENCOUNTER (INPATIENT)
Facility: MEDICAL CENTER | Age: 84
End: 2024-05-31
Attending: EMERGENCY MEDICINE | Admitting: HOSPITALIST
Payer: MEDICARE

## 2024-05-31 ENCOUNTER — APPOINTMENT (OUTPATIENT)
Dept: RADIOLOGY | Facility: MEDICAL CENTER | Age: 84
DRG: 291 | End: 2024-05-31
Attending: EMERGENCY MEDICINE
Payer: MEDICARE

## 2024-05-31 VITALS
WEIGHT: 195.99 LBS | SYSTOLIC BLOOD PRESSURE: 115 MMHG | HEART RATE: 83 BPM | HEIGHT: 65 IN | RESPIRATION RATE: 14 BRPM | BODY MASS INDEX: 32.65 KG/M2 | OXYGEN SATURATION: 91 % | DIASTOLIC BLOOD PRESSURE: 89 MMHG | TEMPERATURE: 98.1 F

## 2024-05-31 DIAGNOSIS — I50.9 HEART FAILURE, UNSPECIFIED HF CHRONICITY, UNSPECIFIED HEART FAILURE TYPE (HCC): ICD-10-CM

## 2024-05-31 DIAGNOSIS — F03.90 DEMENTIA WITHOUT BEHAVIORAL DISTURBANCE, PSYCHOTIC DISTURBANCE, MOOD DISTURBANCE, OR ANXIETY, UNSPECIFIED DEMENTIA SEVERITY, UNSPECIFIED DEMENTIA TYPE (HCC): ICD-10-CM

## 2024-05-31 DIAGNOSIS — R09.02 HYPOXEMIA: ICD-10-CM

## 2024-05-31 DIAGNOSIS — J96.01 ACUTE RESPIRATORY FAILURE WITH HYPOXIA (HCC): ICD-10-CM

## 2024-05-31 DIAGNOSIS — I50.9 DECOMPENSATED HEART FAILURE (HCC): ICD-10-CM

## 2024-05-31 DIAGNOSIS — R60.0 BILATERAL LEG EDEMA: ICD-10-CM

## 2024-05-31 PROBLEM — R79.89 ELEVATED TROPONIN: Status: ACTIVE | Noted: 2024-05-31

## 2024-05-31 LAB
ALBUMIN SERPL BCP-MCNC: 3.7 G/DL (ref 3.2–4.9)
ALBUMIN/GLOB SERPL: 1.4 G/DL
ALP SERPL-CCNC: 95 U/L (ref 30–99)
ALT SERPL-CCNC: 8 U/L (ref 2–50)
ANION GAP SERPL CALC-SCNC: 11 MMOL/L (ref 7–16)
AST SERPL-CCNC: 11 U/L (ref 12–45)
BASOPHILS # BLD AUTO: 0.2 % (ref 0–1.8)
BASOPHILS # BLD: 0.01 K/UL (ref 0–0.12)
BILIRUB SERPL-MCNC: 0.3 MG/DL (ref 0.1–1.5)
BUN SERPL-MCNC: 18 MG/DL (ref 8–22)
CALCIUM ALBUM COR SERPL-MCNC: 9.2 MG/DL (ref 8.5–10.5)
CALCIUM SERPL-MCNC: 9 MG/DL (ref 8.5–10.5)
CHLORIDE SERPL-SCNC: 102 MMOL/L (ref 96–112)
CO2 SERPL-SCNC: 27 MMOL/L (ref 20–33)
CREAT SERPL-MCNC: 1.01 MG/DL (ref 0.5–1.4)
EKG IMPRESSION: NORMAL
EOSINOPHIL # BLD AUTO: 0.1 K/UL (ref 0–0.51)
EOSINOPHIL NFR BLD: 1.6 % (ref 0–6.9)
ERYTHROCYTE [DISTWIDTH] IN BLOOD BY AUTOMATED COUNT: 46.9 FL (ref 35.9–50)
FLUAV RNA SPEC QL NAA+PROBE: NEGATIVE
FLUBV RNA SPEC QL NAA+PROBE: NEGATIVE
GFR SERPLBLD CREATININE-BSD FMLA CKD-EPI: 55 ML/MIN/1.73 M 2
GLOBULIN SER CALC-MCNC: 2.6 G/DL (ref 1.9–3.5)
GLUCOSE SERPL-MCNC: 129 MG/DL (ref 65–99)
HCT VFR BLD AUTO: 47.5 % (ref 37–47)
HGB BLD-MCNC: 15.4 G/DL (ref 12–16)
IMM GRANULOCYTES # BLD AUTO: 0.03 K/UL (ref 0–0.11)
IMM GRANULOCYTES NFR BLD AUTO: 0.5 % (ref 0–0.9)
LYMPHOCYTES # BLD AUTO: 2.27 K/UL (ref 1–4.8)
LYMPHOCYTES NFR BLD: 35.8 % (ref 22–41)
MCH RBC QN AUTO: 27.8 PG (ref 27–33)
MCHC RBC AUTO-ENTMCNC: 32.4 G/DL (ref 32.2–35.5)
MCV RBC AUTO: 85.7 FL (ref 81.4–97.8)
MONOCYTES # BLD AUTO: 0.61 K/UL (ref 0–0.85)
MONOCYTES NFR BLD AUTO: 9.6 % (ref 0–13.4)
NEUTROPHILS # BLD AUTO: 3.32 K/UL (ref 1.82–7.42)
NEUTROPHILS NFR BLD: 52.3 % (ref 44–72)
NRBC # BLD AUTO: 0 K/UL
NRBC BLD-RTO: 0 /100 WBC (ref 0–0.2)
NT-PROBNP SERPL IA-MCNC: 426 PG/ML (ref 0–125)
PLATELET # BLD AUTO: 204 K/UL (ref 164–446)
PMV BLD AUTO: 10.2 FL (ref 9–12.9)
POTASSIUM SERPL-SCNC: 4.4 MMOL/L (ref 3.6–5.5)
PROCALCITONIN SERPL-MCNC: 0.07 NG/ML
PROT SERPL-MCNC: 6.3 G/DL (ref 6–8.2)
RBC # BLD AUTO: 5.54 M/UL (ref 4.2–5.4)
RSV RNA SPEC QL NAA+PROBE: NEGATIVE
SARS-COV-2 RNA RESP QL NAA+PROBE: NOTDETECTED
SODIUM SERPL-SCNC: 140 MMOL/L (ref 135–145)
TROPONIN T SERPL-MCNC: 20 NG/L (ref 6–19)
TROPONIN T SERPL-MCNC: 20 NG/L (ref 6–19)
WBC # BLD AUTO: 6.3 K/UL (ref 4.8–10.8)

## 2024-05-31 PROCEDURE — 700117 HCHG RX CONTRAST REV CODE 255: Performed by: EMERGENCY MEDICINE

## 2024-05-31 PROCEDURE — 85025 COMPLETE CBC W/AUTO DIFF WBC: CPT

## 2024-05-31 PROCEDURE — 83880 ASSAY OF NATRIURETIC PEPTIDE: CPT

## 2024-05-31 PROCEDURE — 36415 COLL VENOUS BLD VENIPUNCTURE: CPT

## 2024-05-31 PROCEDURE — 0241U HCHG SARS-COV-2 COVID-19 NFCT DS RESP RNA 4 TRGT ED POC: CPT

## 2024-05-31 PROCEDURE — 93005 ELECTROCARDIOGRAM TRACING: CPT | Performed by: EMERGENCY MEDICINE

## 2024-05-31 PROCEDURE — 84145 PROCALCITONIN (PCT): CPT

## 2024-05-31 PROCEDURE — 700111 HCHG RX REV CODE 636 W/ 250 OVERRIDE (IP): Performed by: HOSPITALIST

## 2024-05-31 PROCEDURE — 99223 1ST HOSP IP/OBS HIGH 75: CPT | Mod: AI | Performed by: HOSPITALIST

## 2024-05-31 PROCEDURE — 71275 CT ANGIOGRAPHY CHEST: CPT

## 2024-05-31 PROCEDURE — 99285 EMERGENCY DEPT VISIT HI MDM: CPT

## 2024-05-31 PROCEDURE — 84484 ASSAY OF TROPONIN QUANT: CPT

## 2024-05-31 PROCEDURE — A9270 NON-COVERED ITEM OR SERVICE: HCPCS | Performed by: HOSPITALIST

## 2024-05-31 PROCEDURE — 770020 HCHG ROOM/CARE - TELE (206)

## 2024-05-31 PROCEDURE — 71045 X-RAY EXAM CHEST 1 VIEW: CPT

## 2024-05-31 PROCEDURE — 80053 COMPREHEN METABOLIC PANEL: CPT

## 2024-05-31 PROCEDURE — 700102 HCHG RX REV CODE 250 W/ 637 OVERRIDE(OP): Performed by: HOSPITALIST

## 2024-05-31 RX ORDER — LABETALOL HYDROCHLORIDE 5 MG/ML
10 INJECTION, SOLUTION INTRAVENOUS EVERY 4 HOURS PRN
Status: ACTIVE | OUTPATIENT
Start: 2024-05-31

## 2024-05-31 RX ORDER — GUAIFENESIN/DEXTROMETHORPHAN 100-10MG/5
10 SYRUP ORAL EVERY 6 HOURS PRN
Status: ACTIVE | OUTPATIENT
Start: 2024-05-31

## 2024-05-31 RX ORDER — ONDANSETRON 2 MG/ML
4 INJECTION INTRAMUSCULAR; INTRAVENOUS EVERY 4 HOURS PRN
Status: DISCONTINUED | OUTPATIENT
Start: 2024-05-31 | End: 2024-05-31

## 2024-05-31 RX ORDER — DIVALPROEX SODIUM 125 MG/1
125 CAPSULE, COATED PELLETS ORAL 3 TIMES DAILY
Status: DISCONTINUED | OUTPATIENT
Start: 2024-05-31 | End: 2024-05-31

## 2024-05-31 RX ORDER — VALPROIC ACID 250 MG/5ML
125 SOLUTION ORAL 3 TIMES DAILY
Status: DISPENSED | OUTPATIENT
Start: 2024-05-31

## 2024-05-31 RX ORDER — FUROSEMIDE 10 MG/ML
20 INJECTION INTRAMUSCULAR; INTRAVENOUS EVERY 8 HOURS
Status: DISPENSED | OUTPATIENT
Start: 2024-05-31

## 2024-05-31 RX ORDER — SENNOSIDES A AND B 8.6 MG/1
8.6 TABLET, FILM COATED ORAL 2 TIMES DAILY PRN
Status: ON HOLD | COMMUNITY

## 2024-05-31 RX ORDER — ONDANSETRON 4 MG/1
4 TABLET, ORALLY DISINTEGRATING ORAL EVERY 4 HOURS PRN
Status: DISCONTINUED | OUTPATIENT
Start: 2024-05-31 | End: 2024-05-31

## 2024-05-31 RX ORDER — POTASSIUM CHLORIDE 20 MEQ/1
20 TABLET, EXTENDED RELEASE ORAL DAILY
Status: DISCONTINUED | OUTPATIENT
Start: 2024-06-01 | End: 2024-06-01

## 2024-05-31 RX ORDER — ACETAMINOPHEN 325 MG/1
650 TABLET ORAL EVERY 6 HOURS PRN
Status: ACTIVE | OUTPATIENT
Start: 2024-05-31

## 2024-05-31 RX ORDER — LEVOTHYROXINE SODIUM 0.12 MG/1
125 TABLET ORAL
Status: DISPENSED | OUTPATIENT
Start: 2024-06-01

## 2024-05-31 RX ORDER — FUROSEMIDE 40 MG/1
40 TABLET ORAL DAILY
Status: ON HOLD | COMMUNITY
Start: 2024-05-08

## 2024-05-31 RX ORDER — LEVOTHYROXINE SODIUM 0.12 MG/1
125 TABLET ORAL
Status: ON HOLD | COMMUNITY
Start: 2024-05-28

## 2024-05-31 RX ORDER — POTASSIUM CHLORIDE 20 MEQ/1
20 TABLET, EXTENDED RELEASE ORAL DAILY
Status: ON HOLD | COMMUNITY
Start: 2024-05-08

## 2024-05-31 RX ADMIN — IOHEXOL 42 ML: 350 INJECTION, SOLUTION INTRAVENOUS at 20:00

## 2024-05-31 RX ADMIN — FUROSEMIDE 20 MG: 10 INJECTION INTRAMUSCULAR; INTRAVENOUS at 21:55

## 2024-05-31 RX ADMIN — VALPROIC ACID 125 MG: 500 SOLUTION ORAL at 21:55

## 2024-05-31 ASSESSMENT — ENCOUNTER SYMPTOMS
EYE PAIN: 0
DEPRESSION: 0
HEADACHES: 0
ABDOMINAL PAIN: 0
WHEEZING: 0
DIZZINESS: 0
HEMOPTYSIS: 0
DIAPHORESIS: 0
PALPITATIONS: 0
NECK PAIN: 0
SHORTNESS OF BREATH: 0
HEARTBURN: 0
CLAUDICATION: 0
FEVER: 0
DOUBLE VISION: 0
SORE THROAT: 0
CONSTIPATION: 0
FALLS: 0
PND: 0
WEAKNESS: 0
STRIDOR: 0
ORTHOPNEA: 0
VOMITING: 0
TINGLING: 0
SINUS PAIN: 0
BLURRED VISION: 0
BLOOD IN STOOL: 0
MYALGIAS: 0
CHILLS: 0
TREMORS: 0
PHOTOPHOBIA: 0
COUGH: 0
NAUSEA: 0
HALLUCINATIONS: 0
POLYDIPSIA: 0
BACK PAIN: 0
BRUISES/BLEEDS EASILY: 0
DIARRHEA: 0
SPUTUM PRODUCTION: 0
FLANK PAIN: 0

## 2024-05-31 ASSESSMENT — LIFESTYLE VARIABLES
HOW MANY TIMES IN THE PAST YEAR HAVE YOU HAD 5 OR MORE DRINKS IN A DAY: 0
AVERAGE NUMBER OF DAYS PER WEEK YOU HAVE A DRINK CONTAINING ALCOHOL: 0
EVER FELT BAD OR GUILTY ABOUT YOUR DRINKING: NO
CONSUMPTION TOTAL: NEGATIVE
TOTAL SCORE: 0
TOTAL SCORE: 0
ALCOHOL_USE: NO
EVER HAD A DRINK FIRST THING IN THE MORNING TO STEADY YOUR NERVES TO GET RID OF A HANGOVER: NO
ON A TYPICAL DAY WHEN YOU DRINK ALCOHOL HOW MANY DRINKS DO YOU HAVE: 0
TOTAL SCORE: 0
SUBSTANCE_ABUSE: 0
HAVE PEOPLE ANNOYED YOU BY CRITICIZING YOUR DRINKING: NO
HAVE YOU EVER FELT YOU SHOULD CUT DOWN ON YOUR DRINKING: NO
DOES PATIENT WANT TO STOP DRINKING: NO

## 2024-05-31 ASSESSMENT — FIBROSIS 4 INDEX
FIB4 SCORE: 1.6
FIB4 SCORE: 2.07

## 2024-05-31 ASSESSMENT — PAIN DESCRIPTION - PAIN TYPE: TYPE: ACUTE PAIN

## 2024-05-31 NOTE — ED TRIAGE NOTES
"Chief Complaint   Patient presents with    Leg Swelling     Pt sent from care facility for bilateral leg swelling x 1 month     Pt BIB EMS from care facility. Pt has hx of baseline dementia,aox1 to self. Pt states she knows she is in the hospital but does not know why. Pt is a guardian of the state.     Pts RA sat was 88% on RA. Pt placed on 2L of oxygen      /85   Pulse 70   Temp 36.7 °C (98 °F) (Temporal)   Resp 18   Ht 1.651 m (5' 5\")   Wt 68 kg (150 lb)   LMP 03/01/2005   SpO2 94%   BMI 24.96 kg/m²     "

## 2024-05-31 NOTE — ED PROVIDER NOTES
ED Provider Note    CHIEF COMPLAINT  Chief Complaint   Patient presents with    Leg Swelling     Pt sent from care facility for bilateral leg swelling x 1 month       HPI  Gyae Antoine is a 84 y.o. female who presents for evaluation of leg swelling.  Report from EMS indicates that the patient has had leg swelling for a month which is not getting any better.  [place] and has a guardian who apparently decided that she needed to come here.  Patient herself does not know why she is here and does not complain of any symptoms.  She specifically denies chest pain, shortness of breath, or leg pain.  She does not note any symptoms of shortness of breath but did arrive very mildly hypoxic on room air.  He was placed on supplemental oxygen via nasal cannula and is now saturating normally.  EXTERNAL RECORDS REVIEWED  Reviewed last hospitalization in December 2023 for acute hypoxemic respiratory failure due to COVID-19  ROS  Constitutional: No fevers or chills  Skin: No rashes  HEENT: No sore throat, or runny nose  Neck: No neck pain  Chest: No pain   Pulm: No shortness of breath, cough, wheezing, stridor, or pain with inspiration/expiration  Gastrointestinal: No nausea, vomiting, diarrhea, constipation, bloating, melena, hematochezia or abdominal pain.  Genitourinary: No dysuria or hematuria  Musculoskeletal: No pain, swelling, or focal weakness  Neurologic: No sensory or focal motor changes to extremities. No confusion or disorientation.  Heme: No bleeding or bruising problems.   Immuno: No hx of recurrent infections        LIMITATION TO HISTORY   None  OUTSIDE HISTORIAN(S):  EMS transport crew        PAST FAM HISTORY  Family History   Problem Relation Age of Onset    Cancer Mother         breast    Lung Disease Mother         TB    Heart Disease Father         aneurysm    Genitourinary () Problems Sister         polycystic kidneys       PAST MEDICAL HISTORY   has a past medical history of  Cholelithiasis (2010), Chronic constipation, Chronic depressive disorder (1/19/2013), Chronic use of steroids (1/19/2013), CVA (cerebral infarction) (2008), H/O cataract, H/O thyrotoxicosis (1986), Hearing deficit, Hypothyroidism, postablative (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    SOCIAL HISTORY  Social History     Tobacco Use    Smoking status: Former     Current packs/day: 0.50     Average packs/day: 0.5 packs/day for 50.0 years (25.0 ttl pk-yrs)     Types: Cigarettes    Smokeless tobacco: Never    Tobacco comments:     Previous heavy 1 ppd smoker.    Vaping Use    Vaping status: Never Used   Substance and Sexual Activity    Alcohol use: Not Currently     Alcohol/week: 1.2 oz     Types: 2 Shots of liquor per week     Comment: rare    Drug use: No    Sexual activity: Not Currently     Comment:  2005       SURGICAL HISTORY   has a past surgical history that includes ankle fusion (1/8/2009); hip cannulated screw (6/15/2009); other (2008); appendectomy (age 7); cholecystectomy; parathyroid exploration (2/22/2012); rhytidectomy; and mammoplasty reduction (2006).    CURRENT MEDICATIONS  Home Medications       Reviewed by Lor Salazar R.N. (Registered Nurse) on 05/31/24 at 1500  Med List Status: Partial     Medication Last Dose Status   albuterol 108 (90 Base) MCG/ACT Aero Soln inhalation aerosol  Active   divalproex (DEPAKOTE SPRINKLE) 125 MG Capsule Delayed Release Sprinkle  Active   hydrOXYzine HCl (ATARAX) 25 MG Tab  Active   Melatonin 10 MG Tab  Active   QUEtiapine (SEROQUEL) 25 MG Tab  Active                  Audit from Redirected Encounters    **Home medications have not yet been reviewed for this encounter**          ALLERGIES  Allergies   Allergen Reactions    Sulfa Drugs Unspecified     PER MAR       PHYSICAL  "EXAM  VITAL SIGNS: /81   Pulse 73   Temp 36.7 °C (98 °F) (Temporal)   Resp 18   Ht 1.651 m (5' 5\")   Wt 68 kg (150 lb)   LMP 03/01/2005   SpO2 95%   BMI 24.96 kg/m²    Gen: Alert in no apparent distress.  HEENT: No signs of trauma, Bilateral external ears normal, Nose normal. Conjunctiva normal, Non-icteric.   Neck:  No tenderness, Supple, No masses  Cardiovascular: Regular rate and rhythm, no murmurs.  Capillary refill less than 3 seconds to all extremities, 2+ distal pulses.  Thorax & Lungs: Normal breath sounds, No respiratory distress, No wheezing bilateral chest rise  Abdomen: Bowel sounds normal, Soft, No tenderness, No masses, No pulsatile masses. No Guarding or rebound  Skin: Warm, Dry, No erythema, No rash noted to exposed areas.   Extremities: Intact distal pulses, trace pitting edema noted to the pretibial regions near the ankles.  Neurologic: Alert , no facial droop, grossly normal coordination and strength  Psychiatric: Affect pleasant    INITIAL IMPRESSION  Patient arrives for evaluation of apparent lower extremity swelling which has been present for a month.  She does not have any findings to suggest CHF and no abnormal breath sounds or respiratory distress to suggest pulmonary edema.  She is on oxygen as she had oxygen saturation readings below 90 on arrival.  Despite this she states she has no symptoms.  Will attempt to titrate her oxygen while performing screening diagnostics.  Patient has no history of heart problems and does not have any anginal equivalent symptoms.  She is pleasant but does not know why she is here, and does not want to be here.  She states understanding of the workup however and is comfortable with this.    ED observation? No    LABS  Results for orders placed or performed during the hospital encounter of 05/31/24   CBC WITH DIFFERENTIAL   Result Value Ref Range    WBC 6.3 4.8 - 10.8 K/uL    RBC 5.54 (H) 4.20 - 5.40 M/uL    Hemoglobin 15.4 12.0 - 16.0 g/dL    " Hematocrit 47.5 (H) 37.0 - 47.0 %    MCV 85.7 81.4 - 97.8 fL    MCH 27.8 27.0 - 33.0 pg    MCHC 32.4 32.2 - 35.5 g/dL    RDW 46.9 35.9 - 50.0 fL    Platelet Count 204 164 - 446 K/uL    MPV 10.2 9.0 - 12.9 fL    Neutrophils-Polys 52.30 44.00 - 72.00 %    Lymphocytes 35.80 22.00 - 41.00 %    Monocytes 9.60 0.00 - 13.40 %    Eosinophils 1.60 0.00 - 6.90 %    Basophils 0.20 0.00 - 1.80 %    Immature Granulocytes 0.50 0.00 - 0.90 %    Nucleated RBC 0.00 0.00 - 0.20 /100 WBC    Neutrophils (Absolute) 3.32 1.82 - 7.42 K/uL    Lymphs (Absolute) 2.27 1.00 - 4.80 K/uL    Monos (Absolute) 0.61 0.00 - 0.85 K/uL    Eos (Absolute) 0.10 0.00 - 0.51 K/uL    Baso (Absolute) 0.01 0.00 - 0.12 K/uL    Immature Granulocytes (abs) 0.03 0.00 - 0.11 K/uL    NRBC (Absolute) 0.00 K/uL   COMP METABOLIC PANEL   Result Value Ref Range    Sodium 140 135 - 145 mmol/L    Potassium 4.4 3.6 - 5.5 mmol/L    Chloride 102 96 - 112 mmol/L    Co2 27 20 - 33 mmol/L    Anion Gap 11.0 7.0 - 16.0    Glucose 129 (H) 65 - 99 mg/dL    Bun 18 8 - 22 mg/dL    Creatinine 1.01 0.50 - 1.40 mg/dL    Calcium 9.0 8.5 - 10.5 mg/dL    Correct Calcium 9.2 8.5 - 10.5 mg/dL    AST(SGOT) 11 (L) 12 - 45 U/L    ALT(SGPT) 8 2 - 50 U/L    Alkaline Phosphatase 95 30 - 99 U/L    Total Bilirubin 0.3 0.1 - 1.5 mg/dL    Albumin 3.7 3.2 - 4.9 g/dL    Total Protein 6.3 6.0 - 8.2 g/dL    Globulin 2.6 1.9 - 3.5 g/dL    A-G Ratio 1.4 g/dL   TROPONIN   Result Value Ref Range    Troponin T 20 (H) 6 - 19 ng/L   proBrain Natriuretic Peptide, NT   Result Value Ref Range    NT-proBNP 426 (H) 0 - 125 pg/mL   PROCALCITONIN   Result Value Ref Range    Procalcitonin 0.07 <0.25 ng/mL   ESTIMATED GFR   Result Value Ref Range    GFR (CKD-EPI) 55 (A) >60 mL/min/1.73 m 2   EKG (NOW)   Result Value Ref Range    Report       [facility] Emergency Dept.    Test Date:  2024-05-31  Pt Name:    ELIZABETH THOMASLEY              Department: ER  MRN:        [id number]                      Room:        GR 39  Gender:     Female                       Technician: [id number]  :        [id number]                   Requested By:GABBI BOX  Order #:    [id number]                    Reading MD:    Measurements  Intervals                                Axis  Rate:       67                           P:          37  TX:         252                          QRS:        -51  QRSD:       50                           T:          -35  QT:         578  QTc:        611    Interpretive Statements  Sinus rhythm  Atrial premature complexes in couplets  Prolonged TX interval  Consider right ventricular hypertrophy  Inferior infarct, recent  Borderline ST elevation, anterior leads  Prolonged QT interval  Artifact in lead(s) I,III,aVR,aVL,aVF,V1,V2,V3,V4,V5,V6  Compared to ECG 2023 15:04:18  Atrial premature complex(e s) now present  Myocardial infarct finding now present  ST (T wave) deviation now present  Prolonged QT interval now present  Left anterior fascicular block no longer present  Right bundle-branch block no longer present     POC CoV-2, FLU A/B, RSV by PCR   Result Value Ref Range    POC Influenza A RNA, PCR Negative Negative    POC Influenza B RNA, PCR Negative Negative    POC RSV, by PCR Negative Negative    POC SARS-CoV-2, PCR NotDetected      I have independently interpreted this EKG  Sinus rhythm rate of 67, prolonged TX interval noted.  Occasional premature complexes noted.  There are no convincing ST or T wave changes to suggest ischemia, there is a prolonged QT interval.  There is no convincing changes when compared to most recent EKG.  I have independently interpreted the diagnostic imaging associated with this visit and am waiting the final reading from the radiologist.   My preliminary interpretation is a follows: Single view chest x-ray: There are no pulmonary opacities to suggest infiltrates or effusions.  RADIOLOGY  DX-CHEST-PORTABLE (1 VIEW)   Final Result      No acute cardiopulmonary abnormality.       CT-CTA CHEST PULMONARY ARTERY W/ RECONS    (Results Pending)         ASSESSMENT, COURSE AND PLAN  Care Narrative: Patient arrives for evaluation of leg edema which appears to be nonemergent and unlikely related to CHF however she was persistently hypoxic throughout her stay.  Attempts to wean her for the oxygen were transient at best and she generally stayed around 87 or 88% on room air.  She has no symptoms but as she has no oxygen at her care facility, she will need to be admitted for further treatment and evaluation.  I did consider CTA to evaluate for pulmonary embolism but with no tachycardia and no chest pain seems very unlikely.  Patient has had hypoxia in the past and has had to be transient hospitalized however there is generally been an explanation.  Last December she was admitted for COVID-pneumonia with hypoxemia.  Today her COVID is negative as is her influenza and RSV.  Procalcitonin is negative and she does not have a fever or white count.  She has no lobar infiltrates.  Will discuss the case with the hospitalist for observation.    Case discussed with the hospitalist for observation and administration of supplemental oxygen.  Based on the fact that the patient is bedridden and has leg swelling, I feel is reasonable to perform PE scan but she will need to be admitted regardless of the results.      COURSE & MEDICAL DECISION MAKING  Pertinent Labs & Imaging studies reviewed. (See chart for details)      I have discussed management of the patient with the following physicians and JJ's: None    Escalation of care considered, and ultimately not performed: Hospitalization/observation with provocative testing considered but felt to be unnecessary emergently given the findings today.    Barriers to care at this time, including but not limited to: .  None    Decision tools and Rx drugs considered including, but not limited to : None    Discussion of management with other QHP or appropriate source(s):  None    The patient will return for worsening symptoms and is stable at the time of discharge. The patient verbalizes understanding and will comply.    FINAL IMPRESSION  1. Bilateral leg edema    2. Hypoxemia        Electronically signed by: Goldy Ponce M.D., 5/31/2024 3:06 PM

## 2024-06-01 ENCOUNTER — APPOINTMENT (OUTPATIENT)
Dept: CARDIOLOGY | Facility: MEDICAL CENTER | Age: 84
DRG: 291 | End: 2024-06-01
Payer: MEDICARE

## 2024-06-01 PROBLEM — R94.31 PROLONGED Q-T INTERVAL ON ECG: Status: ACTIVE | Noted: 2024-06-01

## 2024-06-01 LAB
ALBUMIN SERPL BCP-MCNC: 3.6 G/DL (ref 3.2–4.9)
ALBUMIN/GLOB SERPL: 1.5 G/DL
ALP SERPL-CCNC: 87 U/L (ref 30–99)
ALT SERPL-CCNC: 11 U/L (ref 2–50)
ANION GAP SERPL CALC-SCNC: 12 MMOL/L (ref 7–16)
AST SERPL-CCNC: 10 U/L (ref 12–45)
B PARAP IS1001 DNA NPH QL NAA+NON-PROBE: NOT DETECTED
B PERT.PT PRMT NPH QL NAA+NON-PROBE: NOT DETECTED
BASOPHILS # BLD AUTO: 0.3 % (ref 0–1.8)
BASOPHILS # BLD: 0.02 K/UL (ref 0–0.12)
BILIRUB SERPL-MCNC: 0.3 MG/DL (ref 0.1–1.5)
BUN SERPL-MCNC: 18 MG/DL (ref 8–22)
C PNEUM DNA NPH QL NAA+NON-PROBE: NOT DETECTED
CALCIUM ALBUM COR SERPL-MCNC: 9.3 MG/DL (ref 8.5–10.5)
CALCIUM SERPL-MCNC: 9 MG/DL (ref 8.5–10.5)
CHLORIDE SERPL-SCNC: 101 MMOL/L (ref 96–112)
CO2 SERPL-SCNC: 26 MMOL/L (ref 20–33)
CREAT SERPL-MCNC: 0.75 MG/DL (ref 0.5–1.4)
EOSINOPHIL # BLD AUTO: 0.08 K/UL (ref 0–0.51)
EOSINOPHIL NFR BLD: 1.1 % (ref 0–6.9)
ERYTHROCYTE [DISTWIDTH] IN BLOOD BY AUTOMATED COUNT: 47.5 FL (ref 35.9–50)
FLUAV RNA NPH QL NAA+NON-PROBE: NOT DETECTED
FLUBV RNA NPH QL NAA+NON-PROBE: NOT DETECTED
GFR SERPLBLD CREATININE-BSD FMLA CKD-EPI: 78 ML/MIN/1.73 M 2
GLOBULIN SER CALC-MCNC: 2.4 G/DL (ref 1.9–3.5)
GLUCOSE SERPL-MCNC: 115 MG/DL (ref 65–99)
HADV DNA NPH QL NAA+NON-PROBE: NOT DETECTED
HCOV 229E RNA NPH QL NAA+NON-PROBE: NOT DETECTED
HCOV HKU1 RNA NPH QL NAA+NON-PROBE: NOT DETECTED
HCOV NL63 RNA NPH QL NAA+NON-PROBE: NOT DETECTED
HCOV OC43 RNA NPH QL NAA+NON-PROBE: NOT DETECTED
HCT VFR BLD AUTO: 46.5 % (ref 37–47)
HGB BLD-MCNC: 14.9 G/DL (ref 12–16)
HMPV RNA NPH QL NAA+NON-PROBE: NOT DETECTED
HPIV1 RNA NPH QL NAA+NON-PROBE: NOT DETECTED
HPIV2 RNA NPH QL NAA+NON-PROBE: NOT DETECTED
HPIV3 RNA NPH QL NAA+NON-PROBE: NOT DETECTED
HPIV4 RNA NPH QL NAA+NON-PROBE: NOT DETECTED
IMM GRANULOCYTES # BLD AUTO: 0.04 K/UL (ref 0–0.11)
IMM GRANULOCYTES NFR BLD AUTO: 0.5 % (ref 0–0.9)
LV EJECT FRACT  99904: 60
LV EJECT FRACT MOD 2C 99903: 50.59
LV EJECT FRACT MOD 4C 99902: 60.99
LV EJECT FRACT MOD BP 99901: 57.56
LYMPHOCYTES # BLD AUTO: 2.38 K/UL (ref 1–4.8)
LYMPHOCYTES NFR BLD: 32.6 % (ref 22–41)
M PNEUMO DNA NPH QL NAA+NON-PROBE: NOT DETECTED
MCH RBC QN AUTO: 27.9 PG (ref 27–33)
MCHC RBC AUTO-ENTMCNC: 32 G/DL (ref 32.2–35.5)
MCV RBC AUTO: 86.9 FL (ref 81.4–97.8)
MONOCYTES # BLD AUTO: 0.85 K/UL (ref 0–0.85)
MONOCYTES NFR BLD AUTO: 11.6 % (ref 0–13.4)
NEUTROPHILS # BLD AUTO: 3.93 K/UL (ref 1.82–7.42)
NEUTROPHILS NFR BLD: 53.9 % (ref 44–72)
NRBC # BLD AUTO: 0 K/UL
NRBC BLD-RTO: 0 /100 WBC (ref 0–0.2)
PLATELET # BLD AUTO: 217 K/UL (ref 164–446)
PMV BLD AUTO: 10.7 FL (ref 9–12.9)
POTASSIUM SERPL-SCNC: 4.2 MMOL/L (ref 3.6–5.5)
PROT SERPL-MCNC: 6 G/DL (ref 6–8.2)
RBC # BLD AUTO: 5.35 M/UL (ref 4.2–5.4)
RSV RNA NPH QL NAA+NON-PROBE: NOT DETECTED
RV+EV RNA NPH QL NAA+NON-PROBE: NOT DETECTED
SARS-COV-2 RNA NPH QL NAA+NON-PROBE: NOTDETECTED
SODIUM SERPL-SCNC: 139 MMOL/L (ref 135–145)
T3FREE SERPL-MCNC: 2.15 PG/ML (ref 2–4.4)
T4 FREE SERPL-MCNC: 1.4 NG/DL (ref 0.93–1.7)
TROPONIN T SERPL-MCNC: 21 NG/L (ref 6–19)
TSH SERPL DL<=0.005 MIU/L-ACNC: 24.6 UIU/ML (ref 0.38–5.33)
WBC # BLD AUTO: 7.3 K/UL (ref 4.8–10.8)

## 2024-06-01 PROCEDURE — 93306 TTE W/DOPPLER COMPLETE: CPT | Mod: 26 | Performed by: INTERNAL MEDICINE

## 2024-06-01 PROCEDURE — 700111 HCHG RX REV CODE 636 W/ 250 OVERRIDE (IP): Performed by: HOSPITALIST

## 2024-06-01 PROCEDURE — 97163 PT EVAL HIGH COMPLEX 45 MIN: CPT

## 2024-06-01 PROCEDURE — 93306 TTE W/DOPPLER COMPLETE: CPT

## 2024-06-01 PROCEDURE — 87798 DETECT AGENT NOS DNA AMP: CPT

## 2024-06-01 PROCEDURE — 87486 CHLMYD PNEUM DNA AMP PROBE: CPT

## 2024-06-01 PROCEDURE — 770020 HCHG ROOM/CARE - TELE (206)

## 2024-06-01 PROCEDURE — A9270 NON-COVERED ITEM OR SERVICE: HCPCS | Performed by: HOSPITALIST

## 2024-06-01 PROCEDURE — 84443 ASSAY THYROID STIM HORMONE: CPT

## 2024-06-01 PROCEDURE — 700102 HCHG RX REV CODE 250 W/ 637 OVERRIDE(OP): Performed by: HOSPITALIST

## 2024-06-01 PROCEDURE — 84439 ASSAY OF FREE THYROXINE: CPT

## 2024-06-01 PROCEDURE — 80053 COMPREHEN METABOLIC PANEL: CPT

## 2024-06-01 PROCEDURE — 84481 FREE ASSAY (FT-3): CPT

## 2024-06-01 PROCEDURE — 84484 ASSAY OF TROPONIN QUANT: CPT

## 2024-06-01 PROCEDURE — 36415 COLL VENOUS BLD VENIPUNCTURE: CPT

## 2024-06-01 PROCEDURE — 85025 COMPLETE CBC W/AUTO DIFF WBC: CPT

## 2024-06-01 PROCEDURE — 99232 SBSQ HOSP IP/OBS MODERATE 35: CPT | Mod: GC | Performed by: HOSPITALIST

## 2024-06-01 PROCEDURE — 87581 M.PNEUMON DNA AMP PROBE: CPT

## 2024-06-01 PROCEDURE — 87633 RESP VIRUS 12-25 TARGETS: CPT

## 2024-06-01 PROCEDURE — 700117 HCHG RX CONTRAST REV CODE 255

## 2024-06-01 RX ADMIN — VALPROIC ACID 125 MG: 500 SOLUTION ORAL at 19:39

## 2024-06-01 RX ADMIN — FUROSEMIDE 20 MG: 10 INJECTION INTRAMUSCULAR; INTRAVENOUS at 15:06

## 2024-06-01 RX ADMIN — VALPROIC ACID 125 MG: 500 SOLUTION ORAL at 05:31

## 2024-06-01 RX ADMIN — POTASSIUM CHLORIDE 20 MEQ: 1500 TABLET, EXTENDED RELEASE ORAL at 05:29

## 2024-06-01 RX ADMIN — LEVOTHYROXINE SODIUM 125 MCG: 0.12 TABLET ORAL at 05:29

## 2024-06-01 RX ADMIN — FUROSEMIDE 20 MG: 10 INJECTION INTRAMUSCULAR; INTRAVENOUS at 05:29

## 2024-06-01 RX ADMIN — HUMAN ALBUMIN MICROSPHERES AND PERFLUTREN 3 ML: 10; .22 INJECTION, SOLUTION INTRAVENOUS at 16:15

## 2024-06-01 RX ADMIN — VALPROIC ACID 125 MG: 500 SOLUTION ORAL at 13:29

## 2024-06-01 RX ADMIN — FUROSEMIDE 20 MG: 10 INJECTION INTRAMUSCULAR; INTRAVENOUS at 21:10

## 2024-06-01 ASSESSMENT — PAIN DESCRIPTION - PAIN TYPE
TYPE: ACUTE PAIN
TYPE: ACUTE PAIN

## 2024-06-01 ASSESSMENT — ENCOUNTER SYMPTOMS
CONSTIPATION: 0
HEADACHES: 0
VOMITING: 0
DIZZINESS: 0
SHORTNESS OF BREATH: 0
CHILLS: 0
NAUSEA: 0
COUGH: 0
DOUBLE VISION: 0
FEVER: 0
ABDOMINAL PAIN: 0
DIARRHEA: 0
HEARTBURN: 0
BLURRED VISION: 0

## 2024-06-01 ASSESSMENT — COGNITIVE AND FUNCTIONAL STATUS - GENERAL
SUGGESTED CMS G CODE MODIFIER MOBILITY: CK
MOBILITY SCORE: 19
STANDING UP FROM CHAIR USING ARMS: A LITTLE
WALKING IN HOSPITAL ROOM: A LITTLE
CLIMB 3 TO 5 STEPS WITH RAILING: A LOT
MOVING TO AND FROM BED TO CHAIR: A LITTLE

## 2024-06-01 ASSESSMENT — GAIT ASSESSMENTS
ASSISTIVE DEVICE: WHEELCHAIR PUSH
GAIT LEVEL OF ASSIST: STANDBY ASSIST
DEVIATION: INCREASED BASE OF SUPPORT;BRADYKINETIC
DISTANCE (FEET): 10

## 2024-06-01 ASSESSMENT — FIBROSIS 4 INDEX: FIB4 SCORE: 1.17

## 2024-06-01 NOTE — PROGRESS NOTES
[facility] INTERNAL MEDICINE PROGRESS NOTE   Medical Student Note    Attending: Natan Ferrer M.D.  Senior Resident: Thomas COLLINS (PGY-3)  Rob Resident: Jorge Juan M.D. (PGY-1)  Medical Student: Mary Peralta, MS3  PATIENT: Gaye Antoine; [id number]; 1940    Hospital Day # Hospital Day: 2    ID: 84 y.o. female with past medical history of CVA, hypothyroid, RA was admitted on 5/31/2024 for B/L LE swelling and pulmonary edema suspected to be from new onset heart failure.    Hospital Course:  Pt presented to the ED for lower extremity swelling and was found to have pulmonary edema on imaging. Troponin and BNP elevated, pt required 2L O2, EKG not concerning for ACS. Pt admitted and placed on lasix, echo ordered.    Interval Update:  - No overnight events  - Pt asymptomatic, A&Ox2  - 1.5 L O2    PMHx:  Cholelithiasis, depression, CVA, hypothyroid, RA    Meds:  Depakote 125mg, lasix 40mg, hydroxyzine 25mg, levothyroxine 125mcg, Kdur 20mEq, Senokot 8.6mg    Allergies:  Sulfa drugs    Surgeries:  Parathyroidectomy, ankle fusion, hip cannulated screw, appendectomy, cholecystectomy, rhytidectomy, mammoplasty reduction    FamHx:  CA, heart disease, lung disease    SocHx:  Former smoker, 25pack years. Denies current alcohol use. Denies recreational drug use.    ROS:  Constitutional:  Negative for chills, diaphoresis, fever and malaise/fatigue.   HENT:  Negative for congestion, ear discharge, ear pain, hearing loss, nosebleeds, sinus pain, sore throat and tinnitus.    Eyes:  Negative for blurred vision, double vision, photophobia and pain.   Respiratory:  Negative for cough, shortness of breath, wheezing and stridor.    Cardiovascular:  Positive for leg swelling. Negative for chest pain, palpitations, orthopnea.  Gastrointestinal:  Negative for abdominal pain, blood in stool, constipation, diarrhea, heartburn, melena, nausea and vomiting.   Genitourinary:  Negative for dysuria, flank pain, frequency, hematuria and  "urgency.   Musculoskeletal:  Negative for back pain, falls, joint pain, myalgias and neck pain.   Skin:  Negative for itching and rash.   Neurological:  Negative for dizziness, tingling, tremors, weakness and headaches.       OBJECTIVE:  Temp:  [36.4 °C (97.5 °F)-36.7 °C (98.1 °F)] 36.4 °C (97.5 °F)  Pulse:  [62-83] 69  Resp:  [14-18] 16  BP: (115-139)/(81-99) 139/95  SpO2:  [86 %-95 %] 93 %    Intake/Output Summary (Last 24 hours) at 6/1/2024 0854  Last data filed at 6/1/2024 0657  Gross per 24 hour   Intake 1160 ml   Output 1600 ml   Net -440 ml       PE:  Gen: No acute distress, resting comfortably in bed, obese  HEENT: normocephalic, atraumatic, EOMI  Pulm: clear to auscultation bilaterally, no respiratory distress   Cardio: RRR, no M/R/G. JVD and hepatojugular reflex  Abdom: soft, nontender, nondistended, normoactive bowel sounds in all quadrants  Ext: 2+ pulses bilaterally, 1+ pitting edema to B/L LE  Neuro: Grossly intact    LABS:  Recent Labs     05/31/24  1528 06/01/24  0142   WBC 6.3 7.3   RBC 5.54* 5.35   HEMOGLOBIN 15.4 14.9   HEMATOCRIT 47.5* 46.5   MCV 85.7 86.9   MCH 27.8 27.9   RDW 46.9 47.5   PLATELETCT 204 217   MPV 10.2 10.7   NEUTSPOLYS 52.30 53.90   LYMPHOCYTES 35.80 32.60   MONOCYTES 9.60 11.60   EOSINOPHILS 1.60 1.10   BASOPHILS 0.20 0.30     Recent Labs     05/31/24  1528 06/01/24  0142   SODIUM 140 139   POTASSIUM 4.4 4.2   CHLORIDE 102 101   CO2 27 26   BUN 18 18   CREATININE 1.01 0.75   CALCIUM 9.0 9.0   ALBUMIN 3.7 3.6     Estimated GFR/CRCL = Estimated Creatinine Clearance: 61.5 mL/min (by C-G formula based on SCr of 0.75 mg/dL).  Recent Labs     05/31/24  1528 06/01/24  0142   GLUCOSE 129* 115*     Recent Labs     05/31/24  1528 06/01/24  0142   ASTSGOT 11* 10*   ALTSGPT 8 11   TBILIRUBIN 0.3 0.3   ALKPHOSPHAT 95 87   GLOBULIN 2.6 2.4             No results for input(s): \"INR\", \"APTT\", \"FIBRINOGEN\" in the last 72 hours.    Invalid input(s): \"DIMER\"    MICROBIOLOGY:   Blood Culture "   Date Value Ref Range Status   09/14/2015 No growth after 5 days of incubation.  Final        IMAGING:   CT-CTA CHEST PULMONARY ARTERY W/ RECONS   Final Result      1.  No pulmonary embolus is identified      2.  Bibasilar atelectasis. Mild peripheral reticulation in the periphery of the right lung is likely chronic in nature      3.  Ascending aortic aneurysm with a maximum diameter of 4.5 cm      Fleischner Society pulmonary nodule recommendations:         DX-CHEST-PORTABLE (1 VIEW)   Final Result      No acute cardiopulmonary abnormality.      EC-ECHOCARDIOGRAM COMPLETE W/O CONT    (Results Pending)       MEDS:  Current Facility-Administered Medications   Medication Last Admin    levothyroxine (Synthroid) tablet 125 mcg 125 mcg at 06/01/24 0529    furosemide (Lasix) injection 20 mg 20 mg at 06/01/24 0529    acetaminophen (Tylenol) tablet 650 mg      labetalol (Normodyne/Trandate) injection 10 mg      guaiFENesin dextromethorphan (Robitussin DM) 100-10 MG/5ML syrup 10 mL      valproic acid (Depakene) IR oral solution 125 mg 125 mg at 06/01/24 0531       PROBLEM LIST:  No problems updated.    ASSESSMENT/PLAN: 84 y.o. female with past medical history of CVA, hypothyroid, RA was admitted on 5/31/2024 for B/L LE swelling and pulmonary edema suspected to be from new onset heart failure. Pt is hemodynamically stable, denies chest pain or SOB, and has hepatojugular reflex and 1+ LE edema on exam. Consider R sided heart failure secondary to obesity hypoventilation syndrome or MOSES. Presentation concerning for new onset heart failure, r/o with echo.    #Acute respiratory failure with hypoxia  No oxygen requirement at home, 2L in the ED. This AM pt is saturating well on RA.    #Heart failure  Admitted for B/L LE edema. No pulmonary edema on CXR. Last echo in Sept 2023 with EF 65%.  - Echo prior to discharge  - Continue lasix    #Mood disorder  - Continue depakote    #Hypothyroid  High TSH, T4 WNL, T3 is borderline low.  -  continue levothyroxine    #DISPOSITION  - Inpatient    Mary Peralta, MS3  [employer]

## 2024-06-01 NOTE — ED NOTES
Patient transported to S140 by S1 charge RN after CT. Pending repeat troponin and meds. Depakene handed over to RN.

## 2024-06-01 NOTE — DISCHARGE INSTRUCTIONS
HF Patient Discharge Instructions  Monitor your weight daily, and maintain a weight chart, to track your weight changes.   Activity as tolerated, unless your Doctor has ordered otherwise.  Follow a low fat, low cholesterol, low salt diet unless instructed otherwise by your Doctor. Read the labels on the back of food products and track your intake of fat, cholesterol and salt.   Fluid Restriction No. If a Fluid Restriction has been ordered by your Doctor, measure fluids with a measuring cup to ensure that you are not exceeding the restriction.   No smoking.  Oxygen Yes. If your Doctor has ordered that you wear Oxygen at home, it is important to wear it as ordered.  Did you receive an explanation from staff on the importance of taking each of your medications and why it is necessary to keep taking them unless your doctor says to stop? Yes  Were all of your questions answered about how to manage your heart failure and what to do if you have increased signs and symptoms after you go home? Yes  Do you feel like your heart failure care team involved you in the care treatment plan and allowed you to make decisions regarding your care while in the hospital and addressed any discharge needs you might have? Yes    See the educational handout provided at discharge for more information on monitoring your daily weight, activity and diet. This also explains more about Heart Failure, symptoms of a flare-up and some of the tests that you have undergone.     Warning Signs of a Flare-Up include:  Swelling in the ankles or lower legs.  Shortness of breath, while at rest, or while doing normal activities.   Shortness of breath at night when in bed, or coughing in bed.   Requiring more pillows to sleep at night, or needing to sit up at night to sleep.  Feeling weak, dizzy or fatigued.     When to call your Doctor:  Call your Primary Care Physician or Cardiologist if:   You experience any pain radiating to your jaw or neck.  You have  any difficulty breathing.  You experience weight gain of 3 lbs in a day or 5 lbs in a week.   You feel any palpitations or irregular heartbeats.  You become dizzy or lose consciousness.   If you have had an angiogram or had a pacemaker or AICD placed, and experience:  Bleeding, drainage or swelling at the surgical / puncture site.  Fever greater than 100.0 F  Shock from internal defibrillator.  Cool and / or numb extremities.     Please access the AHA My HF Guide/Heart Failure Interactive Workbook:   [email or web address].[email or web address]  Diet  Low Sodium Diet - 2 Grams    A Low Sodium Diet - 2 Grams Diet restricts the amount of sodium to no more than 2 g (2000 mg) daily. Limiting the amount of sodium is often used to help lower blood pressure or if you have heart, liver, or kidney problems. Many foods contain sodium for flavor and or as a preservative. Look for products with no more than 500 to 600 mg of sodium per meal and no more than 150 mg per serving on the label. Remember that 2 g = 2000 mg.  Do not add salt to food.    Activity    Resume Your Normal Activity    You may resume your normal activity as tolerated.  Rest as needed.

## 2024-06-01 NOTE — PROGRESS NOTES
4 Eyes Skin Assessment Completed by MICHELLE Llamas and MICHELLE Wolf.    Head WDL  Ears WDL  Nose WDL  Mouth WDL  Neck WDL  Breast/Chest WDL  Shoulder Blades WDL  Spine WDL  (R) Arm/Elbow/Hand WDL  (L) Arm/Elbow/Hand WDL  Abdomen WDL  Groin WDL  Scrotum/Coccyx/Buttocks WDL  (R) Leg WDL  (L) Leg WDL  (R) Heel/Foot/Toe Redness and Blanching  (L) Heel/Foot/Toe Redness and Blanching          Devices In Places Tele Box and Pulse Ox      Interventions In Place Gray Ear Foams, NC W/Ear Foams, and Pillows    Possible Skin Injury No    Pictures Uploaded Into Epic N/A  Wound Consult Placed N/A  RN Wound Prevention Protocol Ordered No

## 2024-06-01 NOTE — CARE PLAN
Pt arrived to the unit via gurney. A&Ox3, disoriented to time. Pleasantly confused in conversation. Frequent reorienting and education provided. Poor historian, unable to complete full admission questions. Full head to toe assessment completed, see flow sheets. Denies pain or discomfort. X1 assist OOB with home WC to the bathroom. Strict I&Os in place. High fall risk interventions in place.      The patient is Stable - Low risk of patient condition declining or worsening         Progress made toward(s) clinical / shift goals:    Problem: Fall Risk  Goal: Patient will remain free from falls  Description: Target End Date:  Prior to discharge or change in level of care    Document interventions on the [facility] Fall Risk Assessment    1.  Assess for fall risk factors  2.  Implement fall precautions  Outcome: Met   Pt remains free from falls this shift. High fall risk interventions in place. Educated on using the call bell before getting out of bed, verbalizes understanding but needs frequent re-educating.    Patient is not progressing towards the following goals:

## 2024-06-01 NOTE — ED NOTES
Bedside report received from off going RN/: Lor, assumed care of patient.  POC discussed with patient. Call light within reach. Aox1    Fall risk interventions in place: Move the patient closer to the nurse's station, Patient's personal possessions are with in their safe reach, Place fall risk sign on patient's door, Keep floor surfaces clean and dry, and Bed Alarm in use (all applicable per [facility] Fall risk assessment)   Continuous monitoring: Cardiac Leads, Pulse Ox, or Blood Pressure  IVF/IV medications: Not Applicable   Oxygen: How many liters 2L and Traced the line to wall oxygen  Bedside sitter: Not Applicable   Isolation: Not Applicable

## 2024-06-01 NOTE — H&P
Hospital Medicine History & Physical Note    Date of Service  5/31/2024    Primary Care Physician  Kvng Browning M.D.    Consultants      Specialist Names:     Code Status  Full Code    Chief Complaint  Chief Complaint   Patient presents with    Leg Swelling     Pt sent from care facility for bilateral leg swelling x 1 month       History of Presenting Illness  Gaye Antoine is a 84 y.o. female who presented 5/31/2024 with past medical history of stroke, hypothyroidism, history of COVID 12/2023 who presents to the hospital for bilateral lower extremity swelling.  Patient states she does not know how long her leg has been swollen.  She was sent here by her group home.  She denies chest pain, shortness of breath or orthopnea.  She denies any cough, fevers, chills.  Patient denies a high salt intake    Chest x-ray interpreted by me found bilateral pulmonary edema  EKG interpreted by me found normal sinus rhythm with PACs    I discussed the plan of care with patient.    Review of Systems  Review of Systems   Constitutional:  Negative for chills, diaphoresis, fever and malaise/fatigue.   HENT:  Negative for congestion, ear discharge, ear pain, hearing loss, nosebleeds, sinus pain, sore throat and tinnitus.    Eyes:  Negative for blurred vision, double vision, photophobia and pain.   Respiratory:  Negative for cough, hemoptysis, sputum production, shortness of breath, wheezing and stridor.    Cardiovascular:  Positive for leg swelling. Negative for chest pain, palpitations, orthopnea, claudication and PND.   Gastrointestinal:  Negative for abdominal pain, blood in stool, constipation, diarrhea, heartburn, melena, nausea and vomiting.   Genitourinary:  Negative for dysuria, flank pain, frequency, hematuria and urgency.   Musculoskeletal:  Negative for back pain, falls, joint pain, myalgias and neck pain.   Skin:  Negative for itching and rash.   Neurological:  Negative for dizziness, tingling, tremors, weakness and  headaches.   Endo/Heme/Allergies:  Negative for environmental allergies and polydipsia. Does not bruise/bleed easily.   Psychiatric/Behavioral:  Negative for depression, hallucinations, substance abuse and suicidal ideas.        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    Surgical History   has a past surgical history that includes ankle fusion (1/8/2009); hip cannulated screw (6/15/2009); other (2008); appendectomy (age 7); cholecystectomy; parathyroid exploration (2/22/2012); rhytidectomy; and mammoplasty reduction (2006).     Family History  family history includes Cancer in her mother; Genitourinary () Problems in her sister; Heart Disease in her father; Lung Disease in her mother.   Family history reviewed with patient. There is no family history that is pertinent to the chief complaint.     Social History   reports that she has quit smoking. Her smoking use included cigarettes. She has a 25 pack-year smoking history. She has never used smokeless tobacco. She reports that she does not currently use alcohol after a past usage of about 1.2 oz of alcohol per week. She reports that she does not use drugs.    Allergies  Allergies   Allergen Reactions    Sulfa Drugs Unspecified     PER MAR       Medications  Prior to Admission Medications   Prescriptions Last Dose Informant Patient Reported? Taking?   Melatonin 10 MG Tab 5/30/2024 at 2000 MAR from Other Facility Yes Yes   Sig: Take 10 mg by mouth at bedtime.   divalproex (DEPAKOTE SPRINKLE)  125 MG Capsule Delayed Release Sprinkle 5/31/2024 at 1200 MAR from Other Facility Yes Yes   Sig: Take 125 mg by mouth 3 times a day.   furosemide (LASIX) 40 MG Tab 5/31/2024 at 0800 MAR from Other Facility Yes Yes   Sig: Take 40 mg by mouth every day.   hydrOXYzine HCl (ATARAX) 25 MG Tab 5/31/2024 at 0800 MAR from Other Facility Yes Yes   Sig: Take 25 mg by mouth 2 times a day.   levothyroxine (SYNTHROID) 125 MCG Tab 5/31/2024 at 0730 MAR from Other Facility Yes Yes   Sig: Take 125 mcg by mouth every morning on an empty stomach.   potassium chloride SA (KDUR) 20 MEQ Tab CR 5/31/2024 at 0800 MAR from Other Facility Yes Yes   Sig: Take 20 mEq by mouth every day.   sennosides (SENOKOT) 8.6 MG Tab PRN at PRN MAR from Other Facility Yes Yes   Sig: Take 8.6 mg by mouth 2 times a day as needed (Constipation).      Facility-Administered Medications: None       Physical Exam  Temp:  [36.7 °C (98 °F)] 36.7 °C (98 °F)  Pulse:  [62-73] 73  Resp:  [18] 18  BP: (119-136)/(81-86) 136/81  SpO2:  [86 %-95 %] 95 %  Blood Pressure : 136/81   Temperature: 36.7 °C (98 °F)   Pulse: 73   Respiration: 18   Pulse Oximetry: 95 %       Physical Exam  Vitals and nursing note reviewed.   Constitutional:       General: She is not in acute distress.     Appearance: Normal appearance. She is not ill-appearing, toxic-appearing or diaphoretic.   HENT:      Head: Normocephalic and atraumatic.      Nose: No congestion or rhinorrhea.      Mouth/Throat:      Pharynx: No oropharyngeal exudate or posterior oropharyngeal erythema.   Eyes:      General: No scleral icterus.  Neck:      Vascular: JVD present. No carotid bruit.   Cardiovascular:      Rate and Rhythm: Normal rate and regular rhythm.      Pulses: Normal pulses.      Heart sounds: Normal heart sounds. No murmur heard.     No friction rub. No gallop.   Pulmonary:      Effort: Pulmonary effort is normal. No respiratory distress.      Breath sounds: No stridor. Rales present. No wheezing or rhonchi.    Abdominal:      General: Abdomen is flat. There is no distension.      Palpations: There is no mass.      Tenderness: There is no abdominal tenderness. There is no left CVA tenderness, guarding or rebound.      Hernia: No hernia is present.   Musculoskeletal:         General: No swelling. Normal range of motion.      Cervical back: No rigidity. No muscular tenderness.      Right lower leg: Edema present.      Left lower leg: Edema present.   Lymphadenopathy:      Cervical: No cervical adenopathy.   Skin:     General: Skin is warm and dry.      Capillary Refill: Capillary refill takes more than 3 seconds.      Coloration: Skin is not jaundiced or pale.      Findings: No bruising or erythema.   Neurological:      Mental Status: She is alert.         Laboratory:  Recent Labs     05/31/24  1528   WBC 6.3   RBC 5.54*   HEMOGLOBIN 15.4   HEMATOCRIT 47.5*   MCV 85.7   MCH 27.8   MCHC 32.4   RDW 46.9   PLATELETCT 204   MPV 10.2     Recent Labs     05/31/24  1528   SODIUM 140   POTASSIUM 4.4   CHLORIDE 102   CO2 27   GLUCOSE 129*   BUN 18   CREATININE 1.01   CALCIUM 9.0     Recent Labs     05/31/24  1528   ALTSGPT 8   ASTSGOT 11*   ALKPHOSPHAT 95   TBILIRUBIN 0.3   GLUCOSE 129*         Recent Labs     05/31/24  1528   NTPROBNP 426*         Recent Labs     05/31/24  1528   TROPONINT 20*       Imaging:  DX-CHEST-PORTABLE (1 VIEW)   Final Result      No acute cardiopulmonary abnormality.      CT-CTA CHEST PULMONARY ARTERY W/ RECONS    (Results Pending)   EC-ECHOCARDIOGRAM COMPLETE W/O CONT    (Results Pending)       X-Ray:  I have personally reviewed the images and compared with prior images.  EKG:  I have personally reviewed the images and compared with prior images.    Assessment/Plan:  Justification for Admission Status  I anticipate this patient will require at least two midnights for appropriate medical management, necessitating inpatient admission because decompensated heart failure    Patient will need a Telemetry bed  on MEDICAL service .  The need is secondary to decompensated heart failure.    * Acute respiratory failure with hypoxia (HCC)- (present on admission)  Assessment & Plan  On 2 L of O2 above baseline  Secondary pulm edema    Elevated troponin  Assessment & Plan  The patient denies any chest pain and we will continue to trend  Monitor on telemetry    Decompensated heart failure (HCC)  Assessment & Plan  Presents with bilateral lower extremity swelling and pulmonary edema  Start IV Lasix 20 3 times daily  Strict ins and outs and daily weights  Monitor on telemetry  Cardiac echo has been ordered    Mood disorder (HCC)- (present on admission)  Assessment & Plan  Continue Depakote    Other specified hypothyroidism- (present on admission)  Assessment & Plan  Continue home thyroid medications        VTE prophylaxis: SCDs/TEDs

## 2024-06-01 NOTE — PROGRESS NOTES
[facility] Internal Medicine Daily Progress Note    Date of Service  6/1/2024    R Team: R IM Blue Team   Attending: JESSICA Ferrer M.d.  Senior Resident: Dr. Leos  Intern:  Dr. Juan  Contact Number: [phone]    Chief Complaint  Gaye Antoine is a 84 y.o. female admitted 5/31/2024 with acute hypoxic respiratory failure.     Hospital Course  Gaye Antoine is a 84 y.o. female who presented 5/31/2024 with past medical history of stroke, hypothyroidism, history of COVID, tobacco use, hyperlipidemia, dementia, mood disorder, was sent from group home with concern for leg swelling.  Patient herself did not complain of symptoms in the ED, and denied chest pain, shortness of breath.  In the ED patient was noted with SpO2 of 87 to 88% on room air, with no baseline oxygen requirement.  proBNP noted 426, negative Pro-Chacho, mildly elevated troponin.  Patient's last echo in September 2023 with LVEF 65%.  Patient has been admitted for  heart failure exacerbation, acute hypoxic respiratory failure,   Currently pending echo.    Interval Problem Update  No acute overnight events  Patient has no major complaints today, patient has history of dementia, this morning ANO x 2, patient thinks that she came from her house however patient was transferred from group home.  Spoke to patient's sister Macrina this morning, orientation is at baseline currently.  Patient currently requires 1 to 2 L of oxygen, chest x-ray unremarkable on admission.  Currently pending respiratory panel   And repeat echo.      I have discussed this patient's plan of care and discharge plan at IDT rounds today with Case Management, Nursing, Nursing leadership, and other members of the IDT team.    Consultants/Specialty  No consults have been made during this admission    Code Status  Full Code    Disposition  The patient is not medically cleared for discharge to home or a post-acute facility.      I have placed the appropriate orders for  post-discharge needs.    Review of Systems  Review of Systems   Constitutional:  Negative for chills and fever.   HENT:  Negative for hearing loss.    Eyes:  Negative for blurred vision and double vision.   Respiratory:  Negative for cough and shortness of breath.    Cardiovascular:  Negative for chest pain and leg swelling.   Gastrointestinal:  Negative for abdominal pain, constipation, diarrhea, heartburn, nausea and vomiting.   Genitourinary:  Negative for dysuria.   Neurological:  Negative for dizziness and headaches.        Physical Exam  Temp:  [36.4 °C (97.5 °F)-36.9 °C (98.4 °F)] 36.9 °C (98.4 °F)  Pulse:  [62-84] 84  Resp:  [14-18] 16  BP: (108-139)/(75-99) 108/75  SpO2:  [86 %-95 %] 94 %    Physical Exam  Constitutional:       Appearance: Normal appearance. She is obese.   HENT:      Head: Normocephalic and atraumatic.      Nose: Nose normal.      Mouth/Throat:      Mouth: Mucous membranes are moist.      Pharynx: Oropharynx is clear.   Eyes:      General: No scleral icterus.     Conjunctiva/sclera: Conjunctivae normal.      Pupils: Pupils are equal, round, and reactive to light.   Cardiovascular:      Rate and Rhythm: Normal rate and regular rhythm.      Pulses: Normal pulses.      Heart sounds: Normal heart sounds.   Pulmonary:      Effort: Pulmonary effort is normal.      Breath sounds: Normal breath sounds.   Abdominal:      General: Abdomen is flat. Bowel sounds are normal.      Palpations: Abdomen is soft.   Musculoskeletal:         General: Normal range of motion.      Cervical back: Normal range of motion and neck supple.   Skin:     General: Skin is warm and dry.      Capillary Refill: Capillary refill takes less than 2 seconds.   Neurological:      General: No focal deficit present.      Mental Status: She is alert and oriented to person, place, and time.         Fluids    Intake/Output Summary (Last 24 hours) at 6/1/2024 1214  Last data filed at 6/1/2024 1102  Gross per 24 hour   Intake 1280 ml    Output 1600 ml   Net -320 ml       Laboratory  Recent Labs     05/31/24  1528 06/01/24  0142   WBC 6.3 7.3   RBC 5.54* 5.35   HEMOGLOBIN 15.4 14.9   HEMATOCRIT 47.5* 46.5   MCV 85.7 86.9   MCH 27.8 27.9   MCHC 32.4 32.0*   RDW 46.9 47.5   PLATELETCT 204 217   MPV 10.2 10.7     Recent Labs     05/31/24  1528 06/01/24  0142   SODIUM 140 139   POTASSIUM 4.4 4.2   CHLORIDE 102 101   CO2 27 26   GLUCOSE 129* 115*   BUN 18 18   CREATININE 1.01 0.75   CALCIUM 9.0 9.0                   Imaging  CT-CTA CHEST PULMONARY ARTERY W/ RECONS   Final Result      1.  No pulmonary embolus is identified      2.  Bibasilar atelectasis. Mild peripheral reticulation in the periphery of the right lung is likely chronic in nature      3.  Ascending aortic aneurysm with a maximum diameter of 4.5 cm      Fleischner Society pulmonary nodule recommendations:         DX-CHEST-PORTABLE (1 VIEW)   Final Result      No acute cardiopulmonary abnormality.      EC-ECHOCARDIOGRAM COMPLETE W/O CONT    (Results Pending)        Assessment/Plan  Problem Representation:    * Acute respiratory failure with hypoxia (HCC)- (present on admission)  Assessment & Plan  Confirmed with sister Macrina that patient has no oxygen requirement at baseline.   On 2 L of O2 above baseline      Decompensated heart failure (HCC)  Assessment & Plan  Patient was admitted with bilateral lower extremity swelling on examination patient has mild, lower extremity swelling up to ankles +1.  Patient was started on Lasix hours,20 every 8 and patient has been net negative for 40 mL on first 12 hours of admission.  Patient's last echo noted an September 2023 with LVEF of 65%, with no oxygen requirement at baseline.  Chest x-ray on admission was clear, unremarkable.  Patient currently requires 1 to 2 L of oxygen.  -currently pending repeat echo  -continue telemetry  -continue laxis      Mood disorder (HCC)- (present on admission)  Assessment & Plan  Continue Depakote    Other specified  hypothyroidism- (present on admission)  Assessment & Plan  Continue home thyroid medications    Prolonged Q-T interval on ECG  Assessment & Plan  Noted on EKG on admission, at 611  -No QT prolonging medications    Elevated troponin  Assessment & Plan  The patient denies any chest pain and we will continue to trend  Monitor on telemetry         VTE prophylaxis: SCDs/TEDs    I have performed a physical exam and reviewed and updated ROS and Plan today (6/1/2024). In review of yesterday's note (5/31/2024), there are no changes except as documented above.

## 2024-06-01 NOTE — ASSESSMENT & PLAN NOTE
Presents with bilateral lower extremity swelling and pulmonary edema  Start IV Lasix 20 3 times daily  Strict ins and outs and daily weights  Monitor on telemetry  Cardiac echo has been ordered

## 2024-06-01 NOTE — ED NOTES
Med rec completed per MAR sent with patient from [facility].    Allergies reviewed per MAR.    No antibiotics noted on MAR for May 2024.    ANTICOAGULATION: NONE.    Pharmacy used by facility: [facility].

## 2024-06-01 NOTE — HOSPITAL COURSE
Gaye Antoine is a 84 y.o. female who presented 5/31/2024 with past medical history of stroke, hypothyroidism, history of COVID, tobacco use, hyperlipidemia, dementia, mood disorder, was sent from group home with concern for leg swelling.  Patient herself did not complain of symptoms in the ED, and denied chest pain, shortness of breath.  In the ED patient was noted with SpO2 of 87 to 88% on room air, with no baseline oxygen requirement.  proBNP noted 426, negative Pro-Chacho, mildly elevated troponin.  Patient's last echo in September 2023 with LVEF 65%.  Patient has been admitted for  heart failure exacerbation, acute hypoxic respiratory failure. Repeat ECHO during this admission noted with LVEF 65%. Patient also noted with history of hypothyroidism and has been on levothyroxine as out patient. Patient has been continued on levo in patient however repeat TSH at 22 during this admission. Upon questioning patient, due to dementia, patient is not aware of thyroid medication, therefore under impression that patient does not take her thyroid medication regularly which may exacerbate underlying dementia.     Other topics that need to be addressed include patient being full code, patient is in public guardianship.  MOCA on 6/2/24 at 18/30.

## 2024-06-01 NOTE — HOSPITAL COURSE
Gaye Antoine is a 84 y.o. female who presented 5/31/2024 with past medical history of stroke, hypothyroidism, history of COVID, tobacco use, hyperlipidemia, dementia, mood disorder, was sent from group home with concern for leg swelling.  Patient herself did not complain of symptoms in the ED, and denied chest pain, shortness of breath.  In the ED patient was noted with SpO2 of 87 to 88% on room air, with no baseline oxygen requirement.  proBNP noted 426, negative Pro-Chacho, mildly elevated troponin.  Patient's last echo in September 2023 with LVEF 65%.  Patient has been admitted for  heart failure exacerbation, acute hypoxic respiratory failure,   Currently pending echo.

## 2024-06-02 PROBLEM — Z71.89 ADVANCED CARE PLANNING/COUNSELING DISCUSSION: Status: ACTIVE | Noted: 2024-06-02

## 2024-06-02 LAB
ALBUMIN SERPL BCP-MCNC: 3.8 G/DL (ref 3.2–4.9)
ALBUMIN/GLOB SERPL: 1.5 G/DL
ALP SERPL-CCNC: 93 U/L (ref 30–99)
ALT SERPL-CCNC: 25 U/L (ref 2–50)
ANION GAP SERPL CALC-SCNC: 12 MMOL/L (ref 7–16)
AST SERPL-CCNC: 21 U/L (ref 12–45)
BILIRUB SERPL-MCNC: 0.7 MG/DL (ref 0.1–1.5)
BUN SERPL-MCNC: 18 MG/DL (ref 8–22)
CALCIUM ALBUM COR SERPL-MCNC: 9.3 MG/DL (ref 8.5–10.5)
CALCIUM SERPL-MCNC: 9.1 MG/DL (ref 8.5–10.5)
CHLORIDE SERPL-SCNC: 99 MMOL/L (ref 96–112)
CO2 SERPL-SCNC: 28 MMOL/L (ref 20–33)
CREAT SERPL-MCNC: 0.97 MG/DL (ref 0.5–1.4)
GFR SERPLBLD CREATININE-BSD FMLA CKD-EPI: 58 ML/MIN/1.73 M 2
GLOBULIN SER CALC-MCNC: 2.6 G/DL (ref 1.9–3.5)
GLUCOSE SERPL-MCNC: 107 MG/DL (ref 65–99)
MAGNESIUM SERPL-MCNC: 2.1 MG/DL (ref 1.5–2.5)
POTASSIUM SERPL-SCNC: 3.9 MMOL/L (ref 3.6–5.5)
PROT SERPL-MCNC: 6.4 G/DL (ref 6–8.2)
SODIUM SERPL-SCNC: 139 MMOL/L (ref 135–145)

## 2024-06-02 PROCEDURE — 700102 HCHG RX REV CODE 250 W/ 637 OVERRIDE(OP): Performed by: HOSPITALIST

## 2024-06-02 PROCEDURE — 83735 ASSAY OF MAGNESIUM: CPT

## 2024-06-02 PROCEDURE — A9270 NON-COVERED ITEM OR SERVICE: HCPCS | Performed by: HOSPITALIST

## 2024-06-02 PROCEDURE — 770020 HCHG ROOM/CARE - TELE (206)

## 2024-06-02 PROCEDURE — 700111 HCHG RX REV CODE 636 W/ 250 OVERRIDE (IP): Mod: JZ

## 2024-06-02 PROCEDURE — 700111 HCHG RX REV CODE 636 W/ 250 OVERRIDE (IP): Performed by: HOSPITALIST

## 2024-06-02 PROCEDURE — 36415 COLL VENOUS BLD VENIPUNCTURE: CPT

## 2024-06-02 PROCEDURE — 99232 SBSQ HOSP IP/OBS MODERATE 35: CPT | Mod: GC | Performed by: HOSPITALIST

## 2024-06-02 PROCEDURE — 80053 COMPREHEN METABOLIC PANEL: CPT

## 2024-06-02 PROCEDURE — A9270 NON-COVERED ITEM OR SERVICE: HCPCS | Mod: JZ

## 2024-06-02 PROCEDURE — 700102 HCHG RX REV CODE 250 W/ 637 OVERRIDE(OP): Mod: JZ

## 2024-06-02 RX ORDER — ENOXAPARIN SODIUM 100 MG/ML
40 INJECTION SUBCUTANEOUS DAILY
Status: DISCONTINUED | OUTPATIENT
Start: 2024-06-02 | End: 2024-06-03 | Stop reason: HOSPADM

## 2024-06-02 RX ORDER — POTASSIUM CHLORIDE 20 MEQ/1
20 TABLET, EXTENDED RELEASE ORAL ONCE
Status: COMPLETED | OUTPATIENT
Start: 2024-06-02 | End: 2024-06-02

## 2024-06-02 RX ORDER — FUROSEMIDE 10 MG/ML
40 INJECTION INTRAMUSCULAR; INTRAVENOUS 2 TIMES DAILY
Status: DISCONTINUED | OUTPATIENT
Start: 2024-06-02 | End: 2024-06-03

## 2024-06-02 RX ORDER — POTASSIUM CHLORIDE 20 MEQ/1
20 TABLET, EXTENDED RELEASE ORAL 2 TIMES DAILY
Status: DISCONTINUED | OUTPATIENT
Start: 2024-06-02 | End: 2024-06-03 | Stop reason: HOSPADM

## 2024-06-02 RX ADMIN — VALPROIC ACID 125 MG: 500 SOLUTION ORAL at 18:15

## 2024-06-02 RX ADMIN — VALPROIC ACID 125 MG: 500 SOLUTION ORAL at 13:26

## 2024-06-02 RX ADMIN — FUROSEMIDE 40 MG: 10 INJECTION INTRAMUSCULAR; INTRAVENOUS at 15:55

## 2024-06-02 RX ADMIN — ENOXAPARIN SODIUM 40 MG: 100 INJECTION SUBCUTANEOUS at 18:15

## 2024-06-02 RX ADMIN — POTASSIUM CHLORIDE 20 MEQ: 1500 TABLET, EXTENDED RELEASE ORAL at 18:15

## 2024-06-02 RX ADMIN — POTASSIUM CHLORIDE 20 MEQ: 1500 TABLET, EXTENDED RELEASE ORAL at 13:26

## 2024-06-02 ASSESSMENT — ENCOUNTER SYMPTOMS
VOMITING: 0
CHILLS: 0
FEVER: 0
ABDOMINAL PAIN: 0
HEADACHES: 0
COUGH: 0
BLURRED VISION: 0
NAUSEA: 0
HEARTBURN: 0
DOUBLE VISION: 0
DIARRHEA: 0
SHORTNESS OF BREATH: 0
DIZZINESS: 0
CONSTIPATION: 0

## 2024-06-02 ASSESSMENT — PATIENT HEALTH QUESTIONNAIRE - PHQ9
SUM OF ALL RESPONSES TO PHQ9 QUESTIONS 1 AND 2: 0
2. FEELING DOWN, DEPRESSED, IRRITABLE, OR HOPELESS: NOT AT ALL
1. LITTLE INTEREST OR PLEASURE IN DOING THINGS: NOT AT ALL

## 2024-06-02 ASSESSMENT — PAIN DESCRIPTION - PAIN TYPE
TYPE: ACUTE PAIN
TYPE: ACUTE PAIN

## 2024-06-02 ASSESSMENT — FIBROSIS 4 INDEX: FIB4 SCORE: 1.17

## 2024-06-02 NOTE — PROGRESS NOTES
[facility] Internal Medicine Daily Progress Note    Date of Service  6/2/2024    R Team: R IM Blue Team   Attending: JESSICA Ferrer M.d.  Senior Resident: Dr. Leos  Intern:  Dr. Juan  Contact Number: [phone]    Chief Complaint  Gaye Antoine is a 84 y.o. female admitted 5/31/2024 with acute hypoxic respiratory failure.     Hospital Course  Gaye Antoine is a 84 y.o. female who presented 5/31/2024 with past medical history of stroke, hypothyroidism, history of COVID, tobacco use, hyperlipidemia, dementia, mood disorder, was sent from group home with concern for leg swelling.  Patient herself did not complain of symptoms in the ED, and denied chest pain, shortness of breath.  In the ED patient was noted with SpO2 of 87 to 88% on room air, with no baseline oxygen requirement.  proBNP noted 426, negative Pro-Chacho, mildly elevated troponin.  Patient's last echo in September 2023 with LVEF 65%.  Patient has been admitted for  heart failure exacerbation, acute hypoxic respiratory failure. Repeat ECHO during this admission noted with LVEF 65%. Patient also noted with history of hypothyroidism and has been on levothyroxine as out patient. Patient has been continued on levo in patient however repeat TSH at 22 during this admission. Upon questioning patient, due to dementia, patient is not aware of thyroid medication, therefore under impression that patient does not take her thyroid medication regularly which may exacerbate underlying dementia.     Other topics that need to be addressed include patient being full code, patient is in public guardianship.  MOCA on 6/2/24 at 18/30.    Interval Problem Update  No acute overnight events  Patient has no major complaints today, patient has history of dementia, this morning ANO x 2. Patient thought it was 2004, and doesn't know why she is here however she is aware that she in at [facility], and her full name.  Patient requiring 2~3L of oxygen, from no oxygen at  baseline. chest x-ray unremarkable on admission.  Patient's Lasix increased from 20 every 8 to 40 twice daily, with potassium supplementation.  Patient with chemical  Repeat echo yesterday showing LVEF 65%.   The other disposition barrier is SLP cognitive eval as with TSH suggestive of patient not taking medications at home, and it is unclear how much help she is able to obtain with her medications at independent facility.    I have discussed this patient's plan of care and discharge plan at IDT rounds today with Case Management, Nursing, Nursing leadership, and other members of the IDT team.    Consultants/Specialty  No consults have been made during this admission    Code Status  Full Code    Disposition  The patient is not medically cleared for discharge to home or a post-acute facility.      I have placed the appropriate orders for post-discharge needs.    Review of Systems  Review of Systems   Constitutional:  Negative for chills and fever.   HENT:  Negative for hearing loss.    Eyes:  Negative for blurred vision and double vision.   Respiratory:  Negative for cough and shortness of breath.    Cardiovascular:  Negative for chest pain and leg swelling.   Gastrointestinal:  Negative for abdominal pain, constipation, diarrhea, heartburn, nausea and vomiting.   Genitourinary:  Negative for dysuria.   Neurological:  Negative for dizziness and headaches.        Physical Exam  Temp:  [36.4 °C (97.5 °F)-36.6 °C (97.9 °F)] 36.6 °C (97.9 °F)  Pulse:  [67-87] 79  Resp:  [17] 17  BP: (123-131)/(77-90) 131/90  SpO2:  [92 %-95 %] 92 %    Physical Exam  Constitutional:       Appearance: Normal appearance. She is obese.   HENT:      Head: Normocephalic and atraumatic.      Nose: Nose normal.      Mouth/Throat:      Mouth: Mucous membranes are moist.      Pharynx: Oropharynx is clear.   Eyes:      General: No scleral icterus.     Conjunctiva/sclera: Conjunctivae normal.      Pupils: Pupils are equal, round, and reactive to  light.   Cardiovascular:      Rate and Rhythm: Normal rate and regular rhythm.      Pulses: Normal pulses.      Heart sounds: Normal heart sounds.   Pulmonary:      Effort: Pulmonary effort is normal.      Breath sounds: Normal breath sounds.   Abdominal:      General: Abdomen is flat. Bowel sounds are normal.      Palpations: Abdomen is soft.   Musculoskeletal:         General: Normal range of motion.      Cervical back: Normal range of motion and neck supple.   Skin:     General: Skin is warm and dry.      Capillary Refill: Capillary refill takes less than 2 seconds.   Neurological:      General: No focal deficit present.      Mental Status: She is alert and oriented to person, place, and time.         Fluids    Intake/Output Summary (Last 24 hours) at 6/2/2024 1542  Last data filed at 6/2/2024 1200  Gross per 24 hour   Intake 1280 ml   Output 0 ml   Net 1280 ml       Laboratory  Recent Labs     05/31/24  1528 06/01/24  0142   WBC 6.3 7.3   RBC 5.54* 5.35   HEMOGLOBIN 15.4 14.9   HEMATOCRIT 47.5* 46.5   MCV 85.7 86.9   MCH 27.8 27.9   MCHC 32.4 32.0*   RDW 46.9 47.5   PLATELETCT 204 217   MPV 10.2 10.7     Recent Labs     05/31/24  1528 06/01/24  0142 06/02/24  0813   SODIUM 140 139 139   POTASSIUM 4.4 4.2 3.9   CHLORIDE 102 101 99   CO2 27 26 28   GLUCOSE 129* 115* 107*   BUN 18 18 18   CREATININE 1.01 0.75 0.97   CALCIUM 9.0 9.0 9.1                   Imaging  EC-ECHOCARDIOGRAM COMPLETE W/ CONT   Final Result      CT-CTA CHEST PULMONARY ARTERY W/ RECONS   Final Result      1.  No pulmonary embolus is identified      2.  Bibasilar atelectasis. Mild peripheral reticulation in the periphery of the right lung is likely chronic in nature      3.  Ascending aortic aneurysm with a maximum diameter of 4.5 cm      Fleischner Society pulmonary nodule recommendations:         DX-CHEST-PORTABLE (1 VIEW)   Final Result      No acute cardiopulmonary abnormality.           Assessment/Plan  Problem Representation:    * Acute  respiratory failure with hypoxia (HCC)- (present on admission)  Assessment & Plan  Confirmed with sister Macrina that patient has no oxygen requirement at baseline.   On 2~3 L of O2 above baseline      Decompensated heart failure (HCC)  Assessment & Plan  Patient was admitted with bilateral lower extremity swelling on examination patient has mild, lower extremity swelling up to ankles +1.  Patient was started on Lasix hours,20 every 8 and patient has been net negative for 440 mL on first 12 hours of admission.  Patient's last echo noted an September 2023 with LVEF of 65%, with no oxygen requirement at baseline.  Repeat echo during this admission noted with LVEF of 60%.  Chest x-ray on admission was clear, unremarkable.  Patient currently requires 2~3 L of oxygen.  Lasix increased to 40 twice daily on June 2  -currently pending repeat echo  -continue telemetry  -continue laxis      Mood disorder (HCC)- (present on admission)  Assessment & Plan  Continue Depakote    Other specified hypothyroidism- (present on admission)  Assessment & Plan  Patient noted with TSH elevated at 22. Patient likely does not take thyroid medications at home.   When questioned patient has no recollection of taking levothyroxine at home.   Patient likely does not have enough support for medication administration. Unclear if patient is to be safely discharged back to independent living  Continue home thyroid medications    Advanced care planning/counseling discussion  Assessment & Plan  Patient is under public guardianship, patient also has sister who visits patient at group home.  Patient currently does not have any capacity to make medical decisions, unclear if patient is at baseline or worsening dementia given hypothyroidism.     Prolonged Q-T interval on ECG  Assessment & Plan  Noted on EKG on admission, at 611  -No QT prolonging medications    Elevated troponin  Assessment & Plan  The patient denies any chest pain and we will continue to  trend  Monitor on telemetry    Dementia (HCC)- (present on admission)  Assessment & Plan  Patient was noted with baseline dementia. Patient at baseline aware of name and place but not date, sometimes may know month, year but not consistently.  Patient also not very reliable on reason for admission.   Patient is under state guardianship.   MOCA was completed on 6/2/24 and noted 18/30.            VTE prophylaxis: SCDs/TEDs+lovenox    I have performed a physical exam and reviewed and updated ROS and Plan today (6/2/2024). In review of yesterday's note (6/1/2024), there are no changes except as documented above.

## 2024-06-02 NOTE — ASSESSMENT & PLAN NOTE
Patient was noted with baseline dementia. Patient at baseline aware of name and place but not date, sometimes may know month, year but not consistently.  Patient also not very reliable on reason for admission.   Patient is under state guardianship.   MOCA was completed on 6/2/24 and noted 18/30.

## 2024-06-02 NOTE — ASSESSMENT & PLAN NOTE
Patient is under public guardianship, patient also has sister who visits patient at group home.  Patient currently does not have any capacity to make medical decisions, unclear if patient is at baseline or worsening dementia given hypothyroidism.

## 2024-06-02 NOTE — CARE PLAN
Problem: Knowledge Deficit - Standard  Goal: Patient and family/care givers will demonstrate understanding of plan of care, disease process/condition, diagnostic tests and medications  Outcome: Progressing   The patient is Stable - Low risk of patient condition declining or worsening    Shift Goals  Clinical Goals: IS use, wean off oxygen, enhance mobility, PT/OT eval  Patient Goals: comfort and d/c    Progress made toward(s) clinical / shift goals:  Will continue to wean patient off the oxygen, patient is currently on 1 liter of oxygen.     By the end of the shift patient will ambulate at least 20ft, patient was able to ambulate at last 15ft, will continue to enhance mobility.     Patient is not progressing towards the following goals:

## 2024-06-02 NOTE — CARE PLAN
Problem: Knowledge Deficit - Standard  Goal: Patient and family/care givers will demonstrate understanding of plan of care, disease process/condition, diagnostic tests and medications  Outcome: Progressing     Will continue to keep patient updated about the POC.      Problem: Respiratory  Goal: Patient will achieve/maintain optimum respiratory ventilation and gas exchange  Outcome: Progressing   Will continue to encourage IS use, patient goal is 2500 mL, however, patient was able attained that goal.     Problem: Mobility  Goal: Patient's capacity to carry out activities will improve  Outcome: Progressing   Goal is to continue to enhance mobility.      The patient is Stable - Low risk of patient condition declining or worsening    Shift Goals  Clinical Goals: Wean off oxygen, mobility, safety, Lasix and I&Os  Patient Goals: comfort    Progress made toward(s) clinical / shift goals:      Patient is not progressing towards the following goals:

## 2024-06-02 NOTE — THERAPY
Speech Language Therapy Contact Note    Patient Name: Gaye Antoine  Age:  84 y.o., Sex:  female  Medical Record #: [id number]  Today's Date: 6/2/2024 06/02/24 1241   Treatment Variance   Reason For Missed Therapy Non-Medical - Patient Refused   Initial Contact Note    Initial Contact Note  Order Received and Verified, Speech Therapy Evaluation in Progress with Full Report to Follow.   Interdisciplinary Plan of Care Collaboration   IDT Collaboration with  Nursing  (security)   Patient Position at End of Therapy Call Light within Reach;Edge of Bed  (Rn and security encouraging patient to get back to bed)   Collaboration Comments Attempted to complete cog eval, patient refusing, attempting to leave, security present, RN present, MD present. RN reports patient refusing cares today. Patient refuses SLP intervention despite education and encouragement. Plan to reattempt in coming days.         Fanny Corley, SLP

## 2024-06-02 NOTE — PROGRESS NOTES
Monitor summary  Rhythm:  SR  Ectopy: PVC bigeminy   HR:  68-86  .20/.13/.49  Per strip from monitor room

## 2024-06-02 NOTE — CARE PLAN
"0450: Pt refusing labs - telling the phlebotomist to get out of the room. Refusing AM meds at this time screaming, \"get out of here!\" Attempting to educate that these medications are important to take to discharge however patient refusing. Refusing stand up scale for weight.    The patient is Stable - Low risk of patient condition declining or worsening    Shift Goals  Clinical Goals: lasix, strict I&Os  Patient Goals: comfort       Patient is not progressing towards the following goals:      Problem: Knowledge Deficit - Standard  Goal: Patient and family/care givers will demonstrate understanding of plan of care, disease process/condition, diagnostic tests and medications  Description: Target End Date:  1-3 days or as soon as patient condition allows    Document in Patient Education    1.  Patient and family/caregiver oriented to unit, equipment, visitation policy and means for communicating concern  2.  Complete/review Learning Assessment  3.  Assess knowledge level of disease process/condition, treatment plan, diagnostic tests and medications  4.  Explain disease process/condition, treatment plan, diagnostic tests and medications  Outcome: Not Met  Pt educated on the use of the call bell before getting up out of bed. Verbalizes understanding however continues to sit up at the edge and not call for assistance which sets off the bed alarm. Poor safety awareness. High fall risk interventions in place.      "

## 2024-06-02 NOTE — THERAPY
Physical Therapy   Initial Evaluation     Patient Name: Gaye Antoine  Age:  84 y.o., Sex:  female  Medical Record #: [id number]  Today's Date: 6/1/2024     Precautions  Precautions: Fall Risk    Assessment  Pt presents with impaired activity tolerance, safety awareness and gait mechanics associated with chief complaint of bilateral LE swelling in setting of CVA, hypothyroidism, COVID (12/2023), found to have pulmonary edema, elevated BNP with volume overload. Pt is very pleasantly confused, able to perform bed mobility and transfers at stand by assist, no attention to her 02 line and no attention to brakes of wheelchair; pushes personal w/c rather than use of FWW; likely near her functional baseline given reason for admit and length of stay but unclear if she has stairs or what assist she gets at her group home; will follow to assist with dc planning and ensure functional ability to return there;     Plan    Physical Therapy Initial Treatment Plan   Treatment Plan : Equipment, Bed Mobility, Gait Training, Manual Therapy, Family / Caregiver Training, Neuro Re-Education / Balance, Self Care / Home Evaluation, Therapeutic Activities, Therapeutic Exercise, Stair Training  Treatment Frequency: 3 Times per Week  Duration: Until Therapy Goals Met    DC Equipment Recommendations: None  Discharge Recommendations: Other - could benefit from home health PT if needed for transition back to group home, currently stand by assist short distances and supervision with w/c and bed        Abridged Subjective/Objective     06/01/24 1610   Prior Living Situation   Prior Services Intermittent Physical Support for ADL Per Service   Housing / Facility Group Home   Comments chart reporting pt lives in [facility], pt report she lives with her sister; unclear if she has to do stairs at group home; personal w/c at bedside;   Prior Level of Functional Mobility   Bed Mobility Unable To Determine At This Time   Cognition    Cognition /  Consciousness X   Level of Consciousness Alert   Safety Awareness Impaired;Impulsive   New Learning Impaired   Comments very pleasant and cooperative but poor historian and poor dual tasking;   Strength Lower Body   Lower Body Strength  WDL   Sensation Lower Body   Lower Extremity Sensation   WDL   Balance Assessment   Sitting Balance (Static) Good   Sitting Balance (Dynamic) Fair +   Standing Balance (Static) Fair -   Standing Balance (Dynamic) Fair -   Weight Shift Sitting Good   Weight Shift Standing Fair   Comments B UE support in sitting/standing; kyphotic pushing w/c   Bed Mobility    Supine to Sit Modified Independent   Sit to Supine Standby Assist  (increased time and effort)   Gait Analysis   Gait Level Of Assist Standby Assist   Assistive Device Wheelchair Push  (pushing her personal w/c declined use of FWW)   Distance (Feet) 10   # of Times Distance was Traveled 2   Deviation Increased Base Of Support;Bradykinetic   Weight Bearing Status full   Vision Deficits Impacting Mobility denies   Comments distance limited by impulsvitiy of pt, found going to toilet, sat on toilet then pushed w/c back and turned and sat rather than continuing ambulation as she reported she was doing; wheeled back and returned to bed   Functional Mobility   Sit to Stand Standby Assist   Bed, Chair, Wheelchair Transfer Standby Assist   Comments needing assist for 02 line management and attention to clothing   Edema / Skin Assessment   Edema / Skin  X   Comments diffuse arterial appearnce of feet otherwise not visualized   Patient / Family Goals    Patient / Family Goal #1 none stated   Short Term Goals    Short Term Goal # 1 Pt will perform supine<>sit  from flat HOB/no raling with supervision within 6 visits to ensure independent mobility at home.   Short Term Goal # 2 pt will self propel w/c x 150ft with supervision within 6 visits to ensure household mobility.   Education Group   Role of Physical Therapist Patient Response  Patient;Acceptance;Explanation;Demonstration;Verbal Demonstration;Action Demonstration   Use of Assistive Device Patient Response Patient;Acceptance;Explanation;Demonstration;Action Demonstration;Verbal Demonstration

## 2024-06-03 VITALS
TEMPERATURE: 97.9 F | WEIGHT: 186.29 LBS | HEART RATE: 94 BPM | HEIGHT: 65 IN | OXYGEN SATURATION: 90 % | RESPIRATION RATE: 16 BRPM | DIASTOLIC BLOOD PRESSURE: 92 MMHG | BODY MASS INDEX: 31.04 KG/M2 | SYSTOLIC BLOOD PRESSURE: 126 MMHG

## 2024-06-03 PROBLEM — R79.89 ELEVATED TROPONIN: Status: RESOLVED | Noted: 2024-05-31 | Resolved: 2024-06-03

## 2024-06-03 PROBLEM — Z71.89 ADVANCED CARE PLANNING/COUNSELING DISCUSSION: Status: RESOLVED | Noted: 2024-06-02 | Resolved: 2024-06-03

## 2024-06-03 PROBLEM — J96.01 ACUTE RESPIRATORY FAILURE WITH HYPOXIA (HCC): Status: RESOLVED | Noted: 2024-05-31 | Resolved: 2024-06-03

## 2024-06-03 LAB
ALBUMIN SERPL BCP-MCNC: 4.1 G/DL (ref 3.2–4.9)
ALBUMIN/GLOB SERPL: 1.4 G/DL
ALP SERPL-CCNC: 106 U/L (ref 30–99)
ALT SERPL-CCNC: 32 U/L (ref 2–50)
ANION GAP SERPL CALC-SCNC: 13 MMOL/L (ref 7–16)
AST SERPL-CCNC: 21 U/L (ref 12–45)
BASOPHILS # BLD AUTO: 0.2 % (ref 0–1.8)
BASOPHILS # BLD: 0.02 K/UL (ref 0–0.12)
BILIRUB SERPL-MCNC: 0.6 MG/DL (ref 0.1–1.5)
BUN SERPL-MCNC: 18 MG/DL (ref 8–22)
CALCIUM ALBUM COR SERPL-MCNC: 9.2 MG/DL (ref 8.5–10.5)
CALCIUM SERPL-MCNC: 9.3 MG/DL (ref 8.5–10.5)
CHLORIDE SERPL-SCNC: 99 MMOL/L (ref 96–112)
CO2 SERPL-SCNC: 26 MMOL/L (ref 20–33)
CREAT SERPL-MCNC: 0.93 MG/DL (ref 0.5–1.4)
EOSINOPHIL # BLD AUTO: 0.15 K/UL (ref 0–0.51)
EOSINOPHIL NFR BLD: 1.8 % (ref 0–6.9)
ERYTHROCYTE [DISTWIDTH] IN BLOOD BY AUTOMATED COUNT: 45.8 FL (ref 35.9–50)
GFR SERPLBLD CREATININE-BSD FMLA CKD-EPI: 61 ML/MIN/1.73 M 2
GLOBULIN SER CALC-MCNC: 3 G/DL (ref 1.9–3.5)
GLUCOSE SERPL-MCNC: 145 MG/DL (ref 65–99)
HCT VFR BLD AUTO: 52.8 % (ref 37–47)
HGB BLD-MCNC: 16.8 G/DL (ref 12–16)
IMM GRANULOCYTES # BLD AUTO: 0.02 K/UL (ref 0–0.11)
IMM GRANULOCYTES NFR BLD AUTO: 0.2 % (ref 0–0.9)
LYMPHOCYTES # BLD AUTO: 2.57 K/UL (ref 1–4.8)
LYMPHOCYTES NFR BLD: 31.2 % (ref 22–41)
MCH RBC QN AUTO: 27.3 PG (ref 27–33)
MCHC RBC AUTO-ENTMCNC: 31.8 G/DL (ref 32.2–35.5)
MCV RBC AUTO: 85.9 FL (ref 81.4–97.8)
MONOCYTES # BLD AUTO: 0.89 K/UL (ref 0–0.85)
MONOCYTES NFR BLD AUTO: 10.8 % (ref 0–13.4)
NEUTROPHILS # BLD AUTO: 4.59 K/UL (ref 1.82–7.42)
NEUTROPHILS NFR BLD: 55.8 % (ref 44–72)
NRBC # BLD AUTO: 0 K/UL
NRBC BLD-RTO: 0 /100 WBC (ref 0–0.2)
PLATELET # BLD AUTO: 233 K/UL (ref 164–446)
PMV BLD AUTO: 10.5 FL (ref 9–12.9)
POTASSIUM SERPL-SCNC: 4.2 MMOL/L (ref 3.6–5.5)
PROT SERPL-MCNC: 7.1 G/DL (ref 6–8.2)
RBC # BLD AUTO: 6.15 M/UL (ref 4.2–5.4)
SODIUM SERPL-SCNC: 138 MMOL/L (ref 135–145)
WBC # BLD AUTO: 8.2 K/UL (ref 4.8–10.8)

## 2024-06-03 PROCEDURE — 700102 HCHG RX REV CODE 250 W/ 637 OVERRIDE(OP): Mod: JZ

## 2024-06-03 PROCEDURE — 80053 COMPREHEN METABOLIC PANEL: CPT

## 2024-06-03 PROCEDURE — 700102 HCHG RX REV CODE 250 W/ 637 OVERRIDE(OP): Performed by: HOSPITALIST

## 2024-06-03 PROCEDURE — 85025 COMPLETE CBC W/AUTO DIFF WBC: CPT

## 2024-06-03 PROCEDURE — A9270 NON-COVERED ITEM OR SERVICE: HCPCS | Performed by: HOSPITALIST

## 2024-06-03 PROCEDURE — 99239 HOSP IP/OBS DSCHRG MGMT >30: CPT | Mod: GC | Performed by: HOSPITALIST

## 2024-06-03 PROCEDURE — 92523 SPEECH SOUND LANG COMPREHEN: CPT

## 2024-06-03 PROCEDURE — 700111 HCHG RX REV CODE 636 W/ 250 OVERRIDE (IP): Performed by: HOSPITALIST

## 2024-06-03 PROCEDURE — A9270 NON-COVERED ITEM OR SERVICE: HCPCS | Mod: JZ

## 2024-06-03 PROCEDURE — 36415 COLL VENOUS BLD VENIPUNCTURE: CPT

## 2024-06-03 RX ORDER — POTASSIUM CHLORIDE 20 MEQ/1
20 TABLET, EXTENDED RELEASE ORAL 2 TIMES DAILY
Qty: 60 TABLET | Refills: 11 | Status: SHIPPED | OUTPATIENT
Start: 2024-06-03

## 2024-06-03 RX ORDER — FUROSEMIDE 40 MG/1
40 TABLET ORAL
Status: DISCONTINUED | OUTPATIENT
Start: 2024-06-03 | End: 2024-06-03 | Stop reason: HOSPADM

## 2024-06-03 RX ORDER — FUROSEMIDE 40 MG/1
40 TABLET ORAL 2 TIMES DAILY
Qty: 60 TABLET | Refills: 2 | Status: SHIPPED | OUTPATIENT
Start: 2024-06-03

## 2024-06-03 RX ADMIN — POTASSIUM CHLORIDE 20 MEQ: 1500 TABLET, EXTENDED RELEASE ORAL at 05:54

## 2024-06-03 RX ADMIN — FUROSEMIDE 40 MG: 10 INJECTION INTRAMUSCULAR; INTRAVENOUS at 05:53

## 2024-06-03 RX ADMIN — VALPROIC ACID 125 MG: 500 SOLUTION ORAL at 05:53

## 2024-06-03 RX ADMIN — VALPROIC ACID 125 MG: 500 SOLUTION ORAL at 12:46

## 2024-06-03 RX ADMIN — LEVOTHYROXINE SODIUM 125 MCG: 0.12 TABLET ORAL at 05:54

## 2024-06-03 ASSESSMENT — COGNITIVE AND FUNCTIONAL STATUS - GENERAL
STANDING UP FROM CHAIR USING ARMS: A LITTLE
CLIMB 3 TO 5 STEPS WITH RAILING: A LOT
WALKING IN HOSPITAL ROOM: A LITTLE
TOILETING: A LITTLE
EATING MEALS: A LITTLE
MOBILITY SCORE: 17
SUGGESTED CMS G CODE MODIFIER MOBILITY: CK
HELP NEEDED FOR BATHING: A LITTLE
TURNING FROM BACK TO SIDE WHILE IN FLAT BAD: A LITTLE
DAILY ACTIVITIY SCORE: 20
MOVING TO AND FROM BED TO CHAIR: A LITTLE
MOVING FROM LYING ON BACK TO SITTING ON SIDE OF FLAT BED: A LITTLE
SUGGESTED CMS G CODE MODIFIER DAILY ACTIVITY: CJ
DRESSING REGULAR LOWER BODY CLOTHING: A LITTLE

## 2024-06-03 ASSESSMENT — FIBROSIS 4 INDEX: FIB4 SCORE: 1.51

## 2024-06-03 ASSESSMENT — PAIN DESCRIPTION - PAIN TYPE: TYPE: ACUTE PAIN

## 2024-06-03 NOTE — THERAPY
"Speech Language Pathology   Cognitive Evaluation     Patient Name: Gaye Antoine  AGE:  84 y.o., SEX:  female  Medical Record #: [id number]  Date of Service: 6/3/2024      History of Present Illness  84F who presented on 5/31/24 from [facility] with concern for leg swelling. Patient found to have acute hypoxic respiratory failure, decompensated heart failure, and elevated troponin. PMH notable for dementia, hypothyroidism, parathyroidectomy, tobacco use, MDD, cerebrovascular disease, hearing loss, memory loss.     Concern for impaired medication management r/t lab levels per primary team.     Previously seen by service for cognitive evaluation on 9/28/23, found to have moderate cognitive impairment. Recommended direct assistance with all IADLs.     No relevant imaging to review.       General Information  Vitals  O2 (LPM): 1  O2 Delivery Device: Silicone Nasal Cannula     Prior Living Situation & Level of Function  Housing / Facility: [facility]  Communication: hx dementia; moderate cognitive impairment on 9/2023 cognitive evaluation       Subjective  RN cleared patient for evaluation. Patient frequently stated \"I don't know\" during interview and stuctured task. Quick escalation to frustration/anger when cued.      Assessment  The patient was seen this date for a cognitive evaluation. Portions of the COGNISTAT (Neurobehavioral Cognitive Status Assessment) were administered in conjunction with non-standardized assessments. Results are outlined below:        Orientation: Mild  Attention: Average  Comprehension: Average  Repetition: Average  Naming: Average  Memory: Moderate  Calculations: Average  Similarities: Average  Judgement: Moderate    Comments: Immediate recall of four word memory task achieved on second attempt. Of note, patient with simple mistake on first attempt and frustrated when clinician corrected, stating \"I said that!\" With delayed recall of ~5 minutes, patient recalled 0/4 words " "independently; provided category prompt, accuracy increased to 3/4; provided three word list, accuracy did not increase; 1/4 words not recalled. Patient noted problem in 2/3 questions on judgement task; did not problem solve despite cues. Patient looking to white board during orientation questions.       Medication Management:  Provided patient with medication instructions. Patient read instructions/questions aloud, though did not answer questions without cues. Provided modA patient answered dosage and medication administration schedule accurately. Patient did not provide suitable memory aid - concerning given memory impairment. Patient stated \"I don't know\" when asked if group home assisted with medication management or what medications she took.     Clinical Impressions  Patient presents with a moderate cognitive impairment in memory and judgement per objective assessment. Informal measures reveal difficulty with medication management. Areas of relative strength include language, attention, and calculations. Presentation is somewhat improved compared to prior cognitive evaluation in 9/2023 (admitted with head injury r/t GLF); this would be consistent with spontaneous/general improvement s/p head injury. Suspect deficits on this evaluation are baseline r/t dementia. Acute intervention is not indicated. Continue to recommend direct assistance with IADLs, including medication management. Per web search, [facility] able to provide though will defer to case management to ensure.       NOTE: It is not within the scope of practice of Speech-Language Pathologists to determine patient capacity. Please defer to the physician or psych to complete this assessment.       Recommendations  Supervision Needs Upons Discharge: Direct assistance with IADLs (see below)  IADLs: Medication management, Financial management, Appointment management, Household chores, Cooking         SLP Treatment Plan  Treatment Plan: None Indicated  SLP " Frequency: N/A - Evaluation Only  Estimated Duration: N/A - Evaluation Only      Anticipated Discharge Needs  Discharge Recommendations: Anticipate that the patient will have no further speech therapy needs after discharge from the hospital  Therapy Recommendations Upon DC: Not Indicated                Araceli Benz, SLP

## 2024-06-03 NOTE — DISCHARGE PLANNING
DC Transport Scheduled    Transport Company Scheduled:  [employer]  Spoke with Natan at [employer] to schedule transport.  Trip #:     Scheduled Date: 6/3/2024  Scheduled Time: 1600    Destination: [facility]   Destination address: [address] [place] [address] [place], [place] [id number]    Notified care team of scheduled transport via [employer].     If there are any changes needed to the DC transportation scheduled, please contact [facility] at ext. [phone] between the hours of 6715-3883 Mon-Fri. If outside those hours, contact the ED Case Manager at ext. [phone].

## 2024-06-03 NOTE — DISCHARGE PLANNING
"YONI received call from [facility], requires \"nursing\" in skilled need section of  order.   RN CM aware.     [id number]  DPA re-sent HH order to [facility].   "

## 2024-06-03 NOTE — CARE PLAN
Problem: Fall Risk  Goal: Patient will remain free from falls  Outcome: Progressing     All fall precautions are in place, will continue to  maintain a safe environment  and encourage patient to use the call light.       Problem: Respiratory  Goal: Patient will achieve/maintain optimum respiratory ventilation and gas exchange  Outcome: Progressing    Patient's oxygen is wean down to 1 Liter and oxygen Sat is at 90-93,  the goal is to get patient down to room air.       Problem: Mobility  Goal: Patient's capacity to carry out activities will improve  Outcome: Progressing     Will continue to encourage mobility.     The patient is Stable - Low risk of patient condition declining or worsening    Shift Goals  Clinical Goals: Wean off oxygen, safety, IS use  Patient Goals: Comfort  Family Goals: n/a    Progress made toward(s) clinical / shift goals:      Patient is not progressing towards the following goals:

## 2024-06-03 NOTE — DISCHARGE PLANNING
Per [facility] patient may be on service with [facility]. Call left for [facility] to verify.

## 2024-06-03 NOTE — PROGRESS NOTES
DOS 6/2/24  I saw and examined the patient and reviewed the case by the resident physician Dr. Juan. I reviewed the resident's note and agree with the documented findings and plan of care except as noted in my comments below.     Additional attending comments:  No resp distress. Still requiring 2L O2 ----> will do oxygen test for home O2. Pitting edema noted in the bilateral LE has improved. Echo with EF of 60% but evidence of right ventricle dysfunction c/w cor pulmonale. Continue lasix diuresis and resp rx. See orders.     JESSICA Ferrer MD, FACP, [facility]  Attending Physician  Professor of Medicine & Academic Hospitalist

## 2024-06-03 NOTE — DISCHARGE SUMMARY
[facility] Discharge Summary    Attending: JESSICA Ferrer M.d.  Senior Resident: Dr. Power  Intern:  Dr. Power  Contact Number: [phone]    CHIEF COMPLAINT ON ADMISSION  Chief Complaint   Patient presents with    Leg Swelling     Pt sent from care facility for bilateral leg swelling x 1 month       Reason for Admission  ems     Admission Date  5/31/2024    CODE STATUS  Full Code    HPI & HOSPITAL COURSE  Gaye Antoine is a 84 y.o. female with past medical history of stroke, hypothyroidism, history of COVID, tobacco use, hyperlipidemia, dementia (baseline a/ox2), mood disorder who presented 5/31/2024 with concern for leg swelling. Patient herself did not complain of symptoms in the ED, and denied chest pain, shortness of breath. In the ED patient was noted to be hypoxic requiring 3 L to maintain her saturations.  She is not on any baseline oxygen at home.  BNP was elevated at 426.  Patient was admitted for acute hypoxic respiratory failure secondary to heart failure exacerbation and initiated on IV diuresis.  Repeat echo was done on this admission that showed left ventricular ejection fraction of 60%.  Her symptoms have greatly improved and she has been able to be weaned down to 1 L of oxygen.  Of note, patient has a history of hypothyroidism and is supposed to be on levothyroxine as outpatient.  However, it does not appear that she has been taking it as her TSH this admission is 24.6 with free T4 of 1.4 and free T3 of 2.15.  She was reinitiated on her home levothyroxine at 125 mcg daily.  PT have cleared patient for discharge with home health which has been placed.  Patient will also be discharged on home oxygen.  At this time, patient is stable for discharge back to group home on oral Lasix 40 mg twice daily.      Therefore, she is discharged in good and stable condition to home with organized home healthcare and close outpatient follow-up.    The patient met 2-midnight criteria for an  inpatient stay at the time of discharge.    Discharge Date  6/3/2024    Physical Exam on Day of Discharge  Physical Exam  Constitutional:       General: She is not in acute distress.     Appearance: Normal appearance.   HENT:      Head: Normocephalic and atraumatic.   Eyes:      Extraocular Movements: Extraocular movements intact.      Pupils: Pupils are equal, round, and reactive to light.   Cardiovascular:      Rate and Rhythm: Normal rate and regular rhythm.   Pulmonary:      Effort: No respiratory distress.      Breath sounds: Normal breath sounds. No wheezing or rales.   Abdominal:      General: There is no distension.      Palpations: Abdomen is soft.   Neurological:      Mental Status: Mental status is at baseline.         FOLLOW UP ITEMS POST DISCHARGE  Follow-up with primary care for hospital stay and chronic conditions.    DISCHARGE DIAGNOSES  Principal Problem (Resolved):    Acute respiratory failure with hypoxia (HCC) (POA: Yes)  Active Problems:    Other specified hypothyroidism (POA: Yes)    Dementia (HCC) (POA: Yes)    Mood disorder (HCC) (POA: Yes)    Decompensated heart failure (HCC) (POA: Unknown)    Prolonged Q-T interval on ECG (POA: Unknown)  Resolved Problems:    Elevated troponin (POA: Unknown)    Advanced care planning/counseling discussion (POA: Unknown)      FOLLOW UP  No future appointments.  No follow-up provider specified.    MEDICATIONS ON DISCHARGE     Medication List        CHANGE how you take these medications        Instructions   furosemide 40 MG Tabs  What changed: when to take this  Commonly known as: Lasix   Take 1 Tablet by mouth 2 times a day.  Dose: 40 mg     potassium chloride SA 20 MEQ Tbcr  What changed: when to take this  Commonly known as: Kdur   Take 1 Tablet by mouth 2 times a day.  Dose: 20 mEq            CONTINUE taking these medications        Instructions   divalproex 125 MG Csdr  Commonly known as: Depakote Sprinkle   Take 125 mg by mouth 3 times a day.  Dose: 125  mg     hydrOXYzine HCl 25 MG Tabs  Commonly known as: Atarax   Take 25 mg by mouth 2 times a day.  Dose: 25 mg     levothyroxine 125 MCG Tabs  Commonly known as: Synthroid   Take 125 mcg by mouth every morning on an empty stomach.  Dose: 125 mcg     Melatonin 10 MG Tabs   Take 10 mg by mouth at bedtime.  Dose: 10 mg     sennosides 8.6 MG Tabs  Commonly known as: Senokot   Take 8.6 mg by mouth 2 times a day as needed (Constipation).  Dose: 8.6 mg              Allergies  Allergies   Allergen Reactions    Sulfa Drugs Unspecified     PER MAR       DIET  Orders Placed This Encounter   Procedures    Diet Order Diet: 2 Gram Sodium     Standing Status:   Standing     Number of Occurrences:   1     Order Specific Question:   Diet:     Answer:   2 Gram Sodium [7]       ACTIVITY  As tolerated.  Weight bearing as tolerated    CONSULTATIONS  N/a    PROCEDURES  N/a    LABORATORY  Lab Results   Component Value Date    SODIUM 138 06/03/2024    POTASSIUM 4.2 06/03/2024    CHLORIDE 99 06/03/2024    CO2 26 06/03/2024    GLUCOSE 145 (H) 06/03/2024    BUN 18 06/03/2024    CREATININE 0.93 06/03/2024    CREATININE 0.8 12/31/2008        Lab Results   Component Value Date    WBC 8.2 06/03/2024    HEMOGLOBIN 16.8 (H) 06/03/2024    HEMATOCRIT 52.8 (H) 06/03/2024    PLATELETCT 233 06/03/2024        Total time of the discharge process exceeds 60 minutes.

## 2024-06-03 NOTE — CARE PLAN
The patient is Stable - Low risk of patient condition declining or worsening    Shift Goals  Clinical Goals: Wean off O2, fall precaution,Rest.  Patient Goals: Rest and sleep.  Family Goals: DAKOTA (no family member, present at this time.)    Progress made toward(s) clinical / shift goals:    Problem: Respiratory  Goal: Patient will achieve/maintain optimum respiratory ventilation and gas exchange  Description: Target End Date:  Prior to discharge or change in level of care    Document on Assessment flowsheet    1.  Assess and monitor rate, rhythm, depth and effort of respiration  2.  Breath sounds assessed qshift and/or as needed  3.  Assess O2 saturation, administer/titrate oxygen as ordered  4.  Position patient for maximum ventilatory efficiency  5.  Turn, cough, and deep breath with splinting to improve effectiveness  6.  Collaborate with RT to administer medication/treatments per order  7.  Encourage use of incentive spirometer and encourage patient to cough after use and utilize splinting techniques if applicable  8.  Airway suctioning  9.  Monitor sputum production for changes in color, consistency and frequency  10. Perform frequent oral hygiene  11. Alternate physical activity with rest periods  Outcome: Progressing  Note: Assess and monitor respiratory sounds,  rate and rhythm. Pt tolerated O2 wean to 1L via nasal cannula, oxygen saturation is between 95-99%. No signs of respiratory distress noted at this time.     Problem: Fall Risk  Goal: Patient will remain free from falls  Description: Target End Date:  Prior to discharge or change in level of care    Document interventions on the [place] Fall Risk Assessment    1.  Assess for fall risk factors  2.  Implement fall precautions  Outcome: Progressing  Note: Keep bed in lowest position, ensure bed alarm is on, maintain safety and fall precautions at all times.      Problem: Mobility  Goal: Patient's capacity to carry out activities will  improve  Description: Target End Date:  Prior to discharge or change in level of care    1.  Assess for barriers to mobility/activity  2.  Implement activity per interdisciplinary team recommendations  3.  Target activity level identified and patient/family/caregiver aware of goal  4.  Provide assistive devices  5.  Instruct patient/caregiver on proper use of assistive/adaptive devices  6.  Schedule activities and rest periods to decrease effects of fatigue  7.  Encourage mobilization to extent of ability  8.  Maintain proper body alignment  9.  Provide adequate pain management to allow progressive mobilization  10. Implement pace maker precautions as needed  Outcome: Progressing  Note: Assess pt's level of mobility, assisted pt in using her personal wheelchair from bed to  bathroom. Educated pt to call for assistance .        Patient is not progressing towards the following goals:

## 2024-06-03 NOTE — DISCHARGE PLANNING
DPA sent HH referral to [facility].   DPA sent DME order to [facility].  Received DME choice at 1223, HH choice at 1014.    RN SIMON requested for DME to be delivered to bedside prior to 1600.    1307  DPA called [facility], spoke to Mary. Said O2 will be delivered to beside within in an hour.

## 2024-06-03 NOTE — DISCHARGE PLANNING
ATTN: Case Management  RE: Referral for Home Health    Reason for referral denial: Per [facility] patient on service with Abby              Unfortunately, we are not able to accept this referral for the reason listed above. If further clarity is needed, our Transitional Care Specialists are available to discuss any barriers to service at [phone].      We look forward to collaborating with you in the future,  [facility] Team

## 2024-06-03 NOTE — FACE TO FACE
"Face to Face Note  -  Durable Medical Equipment    Edson Power D.O. - NPI: [id number]  I certify that this patient is under my care and that they had a durable medical equipment(DME)face to face encounter by myself that meets the physician DME face-to-face encounter requirements with this patient on:    Date of encounter:   Patient:                    MRN:                       YOB: 2024  Gaye Antoine  [id number]  1940     The encounter with the patient was in whole, or in part, for the following medical condition, which is the primary reason for durable medical equipment:  CHF    I certify that, based on my findings, the following durable medical equipment is medically necessary:    Oxygen   HOME O2 Saturation Measurements:(Values must be present for Home Oxygen orders)  Room air sat at rest: 84  Room air sat with amb: DAKOTA - patient refused  With liters of O2: DAKOTA, O2 sat at rest with O2: DAKOTA  With Liters of O2: DAKOTA, O2 sat with amb with O2 : DAKOTA - Patient refused     If patient feels more short of breath, they can go up to 6 liters per minute and contact healthcare provider.    Supporting Symptoms: The patient requires supplemental oxygen, as the following interventions have been tried with limited or no improvement: \"Incentive spirometry.    My Clinical findings support the need for the above equipment due to:  Hypoxia  "

## 2024-06-03 NOTE — PROGRESS NOTES
"BP (!) 126/92   Pulse 94   Temp 36.6 °C (97.9 °F) (Temporal)   Resp 16   Ht 1.651 m (5' 5\")   Wt 84.5 kg (186 lb 4.6 oz)   SpO2 90%     Patient AAO*3, per patient no pain at this time.     Discussed d/c instructions with patient, however, patient was confuse about medications and the continuous oxygen therapy.     All of the patient d/c instructions were given to the medical transport staff that will be handed over to the [facility].        @ 1605, patient was taken off the unit by medical transport to the [facility].   "

## 2024-06-03 NOTE — DISCHARGE PLANNING
Case Management Discharge Planning    Admission Date: 5/31/2024  GMLOS: 3.9  ALOS: 3    6-Clicks ADL Score: 20  6-Clicks Mobility Score: 17  PT and/or OT Eval ordered: No  Post-acute Referrals Ordered: NA  Post-acute Choice Obtained: NA  Has referral(s) been sent to post-acute provider:  CARLOS      Anticipated Discharge Dispo: Discharge Disposition: Discharged to home/self care (01)    DME Needed: No    Action(s) Taken: Updated Provider/Nurse on Discharge Plan, Patient discussed during IDT rounds with medical team. Spoke with sister Rashida, aware of transport to [facility] today at 1600.    Escalations Completed: None    Medically Clear: Yes    Next Steps: Case Management will continue to follow for discharge planning needs.    Barriers to Discharge: None    Is the patient up for discharge tomorrow: No    RN Case Manager met with patient at bedside and obtained the information used in this assessment. Patient verified accuracy of facesheet; patient lives in a single story group home with others.  Prior to current hospitalization, patient was assisted with ADLS/IADLS. Patient has a ride and is able to attend necessary MD appointments. Patient's PCP is Kvng Browning and prefers to use [facility]. Patient has no financial concerns. Patient has support from group home staff. Denies any history of substance use and denies any diagnosis of mental illness.    Care Transition Team Assessment    Information Source: Patients  Orientation Level: Oriented X4  Information Given By: Relative  Who is responsible for making decisions for patient? : Guardian    Readmission Evaluation  Is this a readmission?: No    Elopement Risk  Legal Hold: No  Ambulatory or Self Mobile in Wheelchair: Yes  Disoriented: Time-At Risk for Elopement  Elopement Risk: Not at Risk for Elopement  Personal Belongings: Hospital Clothing Only    Interdisciplinary Discharge Planning  Does Admitting Nurse Feel This Could be a Complex Discharge?:  No  Primary Care Physician: Kvng Browning  Lives with - Patient's Self Care Capacity: Other (Comments) ([facility])  Patient or legal guardian wants to designate a caregiver: No (patient has guardian)  Support Systems: Family Member(s)  Housing / Facility: shelter  Name of Care Facility:  [facility]  Able to Return to Previous ADL's: Yes  Mobility Issues: Yes  Prior Services: Intermittent Physical Support for ADL Per Service  Patient Prefers to be Discharged to:: Group home  Assistance Needed: Yes  Durable Medical Equipment: Walker (Wheelchair)    Discharge Preparedness  What is your plan after discharge?: Home with help  Prior Functional Level: Needs Assist with Activities of Daily Living, Needs Assist with Medication Management  Difficulity with ADLs: Walking  Difficulity with IADLs: Driving, Shopping    Functional Assesment  Prior Functional Level: Needs Assist with Activities of Daily Living, Needs Assist with Medication Management    Finances  Financial Barriers to Discharge: No  Prescription Coverage: Yes    Vision / Hearing Impairment  Vision Impairment : No  Hearing Impairment : No    Values / Beliefs / Concerns  Values / Beliefs Concerns : No    Advance Directive  Advance Directive?: None  Advance Directive offered?: AD Booklet refused    Domestic Abuse  Have you ever been the victim of abuse or violence?: No  Possible Abuse/Neglect Reported to:: Not Applicable    Psychological Assessment  History of Substance Abuse: None  History of Psychiatric Problems: No    Discharge Risks or Barriers  Discharge risks or barriers?: No    Anticipated Discharge Information  Discharge Disposition: Discharged to home/self care (01)

## 2024-06-03 NOTE — DOCUMENTATION QUERY
"                                                                         [facility] hospital                                                                       Query Response Note      PATIENT:               ELIZABETH MYERS  ACCT #:                  [id number]  MRN:                     [id number]  :                      1940  ADMIT DATE:       2024 2:50 PM  DISCH DATE:          RESPONDING  PROVIDER #:        [id number]           QUERY TEXT:    \"Decompensated heart failure\" is documented in the Medical Record. Please document the type and acuity (includes probable or suspected)    Note: If you agree with a diagnosis listed above, please remember to include it in your concurrent daily documentation and onto the Discharge Summary.    The patient's Clinical Indicators include:  (H&P) \"Decompensated heart failure - Presents with bilateral lower extremity swelling and pulmonary edema\"    (PN ) \"Patient has been admitted for heart failure exacerbation, acute hypoxic respiratory failure. Repeat ECHO during this admission noted with LVEF 65%.\"    (ECHO) LV EF: 60 % - Prior echocardiogram 2023.Normal left ventricular systolic function. Limited visualization of the right ventricle suggests it is mildly dilated with reduced systolic function.    Labs: : NT-proBNP: 426    Risk Factors: bilateral lower extremity swelling and pulmonary edema, Acute respiratory failure with hypoxia, Other specified hypothyroidism    Tx: IV Lasix, Cardiac echo, Strict ins and outs and daily weights, Monitor on telemetry    If you have any questions, please contact:  Maurizio Lopez RN [facility] Critical access hospital  [email or web address]  Maurizio Lopez [address]  Options provided:   -- Acute on Chronic Diastolic heart failure   -- Other explanation, Please specify   -- Unable to determine      Query created by: Maurizio Lopez on 6/3/2024 6:33 AM    RESPONSE TEXT:    Acute on chronic right sided systolic heart failure          Electronically " signed by:  RYAN LLANOS MD 6/3/2024 8:15 AM

## 2024-06-03 NOTE — DISCHARGE PLANNING
DPA messaged Gonzalo Hardin, informed that pt is on service with [facility] and to disregard [facility] referral.

## 2024-06-04 ENCOUNTER — PATIENT OUTREACH (OUTPATIENT)
Dept: MEDICAL GROUP | Age: 84
End: 2024-06-04
Payer: MEDICARE

## 2024-06-04 NOTE — PROGRESS NOTES
6/4: 1st attempt to reach pt for TCM outreach. LVM with contact information for pt to call back.     6/5: Transitional Care Management  TCM Outreach Date and Time: Filed (6/4/2024  1:06 PM)    Discharge Questions  Actual Discharge Date: 06/03/24  Did you receive any new prescriptions?: Yes  Meds to Bed or Pharmacy filled?: Pharmacy  Did you have any durable medical equipment ordered?: Yes  Did you receive it?: Yes (Home O2 - currently on)  Do you have a follow up appointment scheduled with your PCP?: No  Was an appointment scheduled for the patient?: No  Appointment Date:  (canceled appt per pt request)  Any issues or paperwork you wish to discuss with your PCP?: No  Are you (patient) able to get to the appointment?: N/A  Reason not scheduled?: Declines  If Home Health was ordered, have they contacted you (Patient): Yes  Does this patient qualify for the CCM program?: No    Transitional Care  Number of attempts made to contact patient: 2  Current or previous attempts completed within two business days of discharge? : Yes  Has patient completed an Advanced Directive?: No  Has the Care Manager's phone number provided?: No    Discharge Summary  Chief Complaint: Leg Swelling - Pt sent from care facility for bilateral leg swelling x 1 month  Admitting Diagnosis: ems  Discharge Diagnosis: acute respiratory failure with hypoxia

## 2024-09-24 NOTE — ASSESSMENT & PLAN NOTE
She was taking 1/2 tab of 10 mg tablet and feeling less mean to everyone. Overall she feels better. She lives alone, but has family nearby. This started when she became a  in 2015, overall doing better. She denies any suicidal or homicidal ideations. She denies any depressed mood.   Patient baseline mental status

## 2024-10-07 ENCOUNTER — APPOINTMENT (OUTPATIENT)
Dept: RADIOLOGY | Facility: MEDICAL CENTER | Age: 84
End: 2024-10-07
Attending: EMERGENCY MEDICINE
Payer: MEDICARE

## 2024-10-07 ENCOUNTER — HOSPITAL ENCOUNTER (EMERGENCY)
Facility: MEDICAL CENTER | Age: 84
End: 2024-10-07
Attending: EMERGENCY MEDICINE
Payer: MEDICARE

## 2024-10-07 VITALS
DIASTOLIC BLOOD PRESSURE: 72 MMHG | OXYGEN SATURATION: 93 % | TEMPERATURE: 97.5 F | WEIGHT: 186 LBS | BODY MASS INDEX: 30.95 KG/M2 | SYSTOLIC BLOOD PRESSURE: 116 MMHG | RESPIRATION RATE: 16 BRPM | HEART RATE: 87 BPM
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
## 2024-10-07 PROCEDURE — 71045 X-RAY EXAM CHEST 1 VIEW: CPT

## 2024-10-07 PROCEDURE — 99284 EMERGENCY DEPT VISIT MOD MDM: CPT

## 2024-10-07 ASSESSMENT — FIBROSIS 4 INDEX: FIB4 SCORE: 1.34

## 2024-11-15 ENCOUNTER — HOSPITAL ENCOUNTER (OUTPATIENT)
Facility: MEDICAL CENTER | Age: 84
End: 2024-11-15
Attending: INTERNAL MEDICINE
Payer: MEDICARE

## 2024-11-15 LAB
ALBUMIN SERPL BCP-MCNC: 4 G/DL (ref 3.2–4.9)
ALBUMIN/GLOB SERPL: 1.3 G/DL
ALP SERPL-CCNC: 106 U/L (ref 30–99)
ALT SERPL-CCNC: 18 U/L (ref 2–50)
AMMONIA PLAS-SCNC: 26 UMOL/L (ref 11–45)
ANION GAP SERPL CALC-SCNC: 13 MMOL/L (ref 7–16)
AST SERPL-CCNC: 21 U/L (ref 12–45)
BILIRUB SERPL-MCNC: 0.4 MG/DL (ref 0.1–1.5)
BUN SERPL-MCNC: 18 MG/DL (ref 8–22)
CALCIUM ALBUM COR SERPL-MCNC: 9.1 MG/DL (ref 8.5–10.5)
CALCIUM SERPL-MCNC: 9.1 MG/DL (ref 8.5–10.5)
CHLORIDE SERPL-SCNC: 102 MMOL/L (ref 96–112)
CO2 SERPL-SCNC: 27 MMOL/L (ref 20–33)
CREAT SERPL-MCNC: 0.79 MG/DL (ref 0.5–1.4)
FASTING STATUS PATIENT QL REPORTED: NORMAL
GFR SERPLBLD CREATININE-BSD FMLA CKD-EPI: 73 ML/MIN/1.73 M 2
GLOBULIN SER CALC-MCNC: 3.1 G/DL (ref 1.9–3.5)
GLUCOSE SERPL-MCNC: 133 MG/DL (ref 65–99)
POTASSIUM SERPL-SCNC: 4.4 MMOL/L (ref 3.6–5.5)
PROT SERPL-MCNC: 7.1 G/DL (ref 6–8.2)
SODIUM SERPL-SCNC: 142 MMOL/L (ref 135–145)
T4 FREE SERPL-MCNC: 1.17 NG/DL (ref 0.93–1.7)
TSH SERPL-ACNC: 14.1 UIU/ML (ref 0.35–5.5)
VALPROATE SERPL-MCNC: 32.5 UG/ML (ref 50–100)

## 2024-11-15 PROCEDURE — 84439 ASSAY OF FREE THYROXINE: CPT

## 2024-11-15 PROCEDURE — 80164 ASSAY DIPROPYLACETIC ACD TOT: CPT

## 2024-11-15 PROCEDURE — 84443 ASSAY THYROID STIM HORMONE: CPT

## 2024-11-15 PROCEDURE — 80053 COMPREHEN METABOLIC PANEL: CPT

## 2024-11-15 PROCEDURE — 82140 ASSAY OF AMMONIA: CPT

## 2024-12-13 NOTE — ED NOTES
Attempted to contact patient's . Left message, requesting call back.    Pt is awake laying in bed, bed alarm on, oxygen is in place via [place].

## 2024-12-23 ENCOUNTER — HOSPITAL ENCOUNTER (OUTPATIENT)
Facility: MEDICAL CENTER | Age: 84
End: 2024-12-23
Attending: INTERNAL MEDICINE
Payer: MEDICARE

## 2024-12-23 LAB
APPEARANCE UR: ABNORMAL
BACTERIA #/AREA URNS HPF: ABNORMAL /HPF
BILIRUB UR QL STRIP.AUTO: NEGATIVE
CASTS URNS QL MICRO: ABNORMAL /LPF (ref 0–2)
COLOR UR: YELLOW
EPITHELIAL CELLS 1715: ABNORMAL /HPF (ref 0–5)
GLUCOSE UR STRIP.AUTO-MCNC: NEGATIVE MG/DL
KETONES UR STRIP.AUTO-MCNC: NEGATIVE MG/DL
LEUKOCYTE ESTERASE UR QL STRIP.AUTO: NEGATIVE
MICRO URNS: ABNORMAL
NITRITE UR QL STRIP.AUTO: NEGATIVE
PH UR STRIP.AUTO: 6 [PH] (ref 5–8)
PROT UR QL STRIP: NEGATIVE MG/DL
RBC # URNS HPF: ABNORMAL /HPF (ref 0–2)
RBC UR QL AUTO: NEGATIVE
SP GR UR STRIP.AUTO: 1.02
UROBILINOGEN UR STRIP.AUTO-MCNC: 1 EU/DL
WBC #/AREA URNS HPF: ABNORMAL /HPF

## 2024-12-23 PROCEDURE — 81001 URINALYSIS AUTO W/SCOPE: CPT

## 2025-03-29 ENCOUNTER — HOSPITAL ENCOUNTER (OUTPATIENT)
Facility: MEDICAL CENTER | Age: 85
End: 2025-03-29
Attending: INTERNAL MEDICINE
Payer: MEDICARE

## 2025-03-29 LAB
ALBUMIN SERPL BCP-MCNC: 3.8 G/DL (ref 3.2–4.9)
ALBUMIN/GLOB SERPL: 1.5 G/DL
ALP SERPL-CCNC: 105 U/L (ref 30–99)
ALT SERPL-CCNC: 12 U/L (ref 2–50)
ANION GAP SERPL CALC-SCNC: 12 MMOL/L (ref 7–16)
AST SERPL-CCNC: 16 U/L (ref 12–45)
BASOPHILS # BLD AUTO: 0.3 % (ref 0–1.8)
BASOPHILS # BLD: 0.02 K/UL (ref 0–0.12)
BILIRUB SERPL-MCNC: 0.5 MG/DL (ref 0.1–1.5)
BUN SERPL-MCNC: 16 MG/DL (ref 8–22)
CALCIUM ALBUM COR SERPL-MCNC: 9.4 MG/DL (ref 8.5–10.5)
CALCIUM SERPL-MCNC: 9.2 MG/DL (ref 8.5–10.5)
CHLORIDE SERPL-SCNC: 103 MMOL/L (ref 96–112)
CHOLEST SERPL-MCNC: 189 MG/DL (ref 100–199)
CO2 SERPL-SCNC: 28 MMOL/L (ref 20–33)
CREAT SERPL-MCNC: 0.8 MG/DL (ref 0.5–1.4)
EOSINOPHIL # BLD AUTO: 0.11 K/UL (ref 0–0.51)
EOSINOPHIL NFR BLD: 1.8 % (ref 0–6.9)
ERYTHROCYTE [DISTWIDTH] IN BLOOD BY AUTOMATED COUNT: 49.3 FL (ref 35.9–50)
GFR SERPLBLD CREATININE-BSD FMLA CKD-EPI: 72 ML/MIN/1.73 M 2
GLOBULIN SER CALC-MCNC: 2.5 G/DL (ref 1.9–3.5)
GLUCOSE SERPL-MCNC: 84 MG/DL (ref 65–99)
HCT VFR BLD AUTO: 49.7 % (ref 37–47)
HDLC SERPL-MCNC: 55 MG/DL
HGB BLD-MCNC: 15.5 G/DL (ref 12–16)
IMM GRANULOCYTES # BLD AUTO: 0.04 K/UL (ref 0–0.11)
IMM GRANULOCYTES NFR BLD AUTO: 0.6 % (ref 0–0.9)
LDLC SERPL CALC-MCNC: 110 MG/DL
LYMPHOCYTES # BLD AUTO: 2.49 K/UL (ref 1–4.8)
LYMPHOCYTES NFR BLD: 39.6 % (ref 22–41)
MCH RBC QN AUTO: 28 PG (ref 27–33)
MCHC RBC AUTO-ENTMCNC: 31.2 G/DL (ref 32.2–35.5)
MCV RBC AUTO: 89.7 FL (ref 81.4–97.8)
MONOCYTES # BLD AUTO: 0.52 K/UL (ref 0–0.85)
MONOCYTES NFR BLD AUTO: 8.3 % (ref 0–13.4)
NEUTROPHILS # BLD AUTO: 3.1 K/UL (ref 1.82–7.42)
NEUTROPHILS NFR BLD: 49.4 % (ref 44–72)
NRBC # BLD AUTO: 0 K/UL
NRBC BLD-RTO: 0 /100 WBC (ref 0–0.2)
PLATELET # BLD AUTO: 204 K/UL (ref 164–446)
PMV BLD AUTO: 11.4 FL (ref 9–12.9)
POTASSIUM SERPL-SCNC: 4.5 MMOL/L (ref 3.6–5.5)
PROT SERPL-MCNC: 6.3 G/DL (ref 6–8.2)
RBC # BLD AUTO: 5.54 M/UL (ref 4.2–5.4)
SODIUM SERPL-SCNC: 143 MMOL/L (ref 135–145)
T4 FREE SERPL-MCNC: 1.2 NG/DL (ref 0.93–1.7)
TRIGL SERPL-MCNC: 122 MG/DL (ref 0–149)
TSH SERPL-ACNC: 8.29 UIU/ML (ref 0.35–5.5)
VALPROATE SERPL-MCNC: 38.2 UG/ML (ref 50–100)
WBC # BLD AUTO: 6.3 K/UL (ref 4.8–10.8)

## 2025-03-29 PROCEDURE — 84443 ASSAY THYROID STIM HORMONE: CPT

## 2025-03-29 PROCEDURE — 80164 ASSAY DIPROPYLACETIC ACD TOT: CPT

## 2025-03-29 PROCEDURE — 85025 COMPLETE CBC W/AUTO DIFF WBC: CPT

## 2025-03-29 PROCEDURE — 80061 LIPID PANEL: CPT

## 2025-03-29 PROCEDURE — 84439 ASSAY OF FREE THYROXINE: CPT

## 2025-03-29 PROCEDURE — 80053 COMPREHEN METABOLIC PANEL: CPT

## 2025-03-31 LAB
EST. AVERAGE GLUCOSE BLD GHB EST-MCNC: 140 MG/DL
HBA1C MFR BLD: 6.5 % (ref 4–5.6)

## 2025-06-24 ENCOUNTER — HOSPITAL ENCOUNTER (OUTPATIENT)
Facility: MEDICAL CENTER | Age: 85
End: 2025-06-24
Attending: INTERNAL MEDICINE
Payer: MEDICARE

## 2025-06-24 LAB
APPEARANCE UR: CLEAR
BACTERIA #/AREA URNS HPF: ABNORMAL /HPF
BILIRUB UR QL STRIP.AUTO: NEGATIVE
CASTS URNS QL MICRO: ABNORMAL /LPF (ref 0–2)
COLOR UR: YELLOW
EPITHELIAL CELLS 1715: ABNORMAL /HPF (ref 0–5)
GLUCOSE UR STRIP.AUTO-MCNC: NEGATIVE MG/DL
KETONES UR STRIP.AUTO-MCNC: ABNORMAL MG/DL
LEUKOCYTE ESTERASE UR QL STRIP.AUTO: ABNORMAL
MICRO URNS: ABNORMAL
NITRITE UR QL STRIP.AUTO: NEGATIVE
PH UR STRIP.AUTO: 6.5 [PH] (ref 5–8)
PROT UR QL STRIP: NEGATIVE MG/DL
RBC # URNS HPF: ABNORMAL /HPF
RBC UR QL AUTO: NEGATIVE
SP GR UR STRIP.AUTO: 1.02
UROBILINOGEN UR STRIP.AUTO-MCNC: 1 EU/DL
WBC #/AREA URNS HPF: ABNORMAL /HPF

## 2025-06-24 PROCEDURE — 87086 URINE CULTURE/COLONY COUNT: CPT

## 2025-06-24 PROCEDURE — 81001 URINALYSIS AUTO W/SCOPE: CPT

## 2025-06-26 LAB
BACTERIA UR CULT: NORMAL
SIGNIFICANT IND 70042: NORMAL
SITE SITE: NORMAL
SOURCE SOURCE: NORMAL

## 2025-07-16 ENCOUNTER — APPOINTMENT (OUTPATIENT)
Dept: RADIOLOGY | Facility: MEDICAL CENTER | Age: 85
End: 2025-07-16
Attending: STUDENT IN AN ORGANIZED HEALTH CARE EDUCATION/TRAINING PROGRAM
Payer: MEDICARE

## 2025-07-16 ENCOUNTER — HOSPITAL ENCOUNTER (EMERGENCY)
Facility: MEDICAL CENTER | Age: 85
End: 2025-07-16
Attending: STUDENT IN AN ORGANIZED HEALTH CARE EDUCATION/TRAINING PROGRAM
Payer: MEDICARE

## 2025-07-16 VITALS
BODY MASS INDEX: 28.19 KG/M2 | DIASTOLIC BLOOD PRESSURE: 111 MMHG | HEART RATE: 80 BPM | HEIGHT: 68 IN | WEIGHT: 186 LBS | TEMPERATURE: 98.1 F | SYSTOLIC BLOOD PRESSURE: 156 MMHG | RESPIRATION RATE: 16 BRPM | OXYGEN SATURATION: 94 %

## 2025-07-16 DIAGNOSIS — M79.89 LEG SWELLING: Primary | ICD-10-CM

## 2025-07-16 LAB
ALBUMIN SERPL BCP-MCNC: 3.7 G/DL (ref 3.2–4.9)
ALBUMIN/GLOB SERPL: 1.2 G/DL
ALP SERPL-CCNC: 101 U/L (ref 30–99)
ALT SERPL-CCNC: 11 U/L (ref 2–50)
ANION GAP SERPL CALC-SCNC: 13 MMOL/L (ref 7–16)
AST SERPL-CCNC: 15 U/L (ref 12–45)
BASOPHILS # BLD AUTO: 0.3 % (ref 0–1.8)
BASOPHILS # BLD: 0.03 K/UL (ref 0–0.12)
BILIRUB SERPL-MCNC: 0.3 MG/DL (ref 0.1–1.5)
BUN SERPL-MCNC: 17 MG/DL (ref 8–22)
CALCIUM ALBUM COR SERPL-MCNC: 8.9 MG/DL (ref 8.5–10.5)
CALCIUM SERPL-MCNC: 8.7 MG/DL (ref 8.5–10.5)
CHLORIDE SERPL-SCNC: 103 MMOL/L (ref 96–112)
CO2 SERPL-SCNC: 27 MMOL/L (ref 20–33)
CREAT SERPL-MCNC: 0.78 MG/DL (ref 0.5–1.4)
EKG IMPRESSION: NORMAL
EOSINOPHIL # BLD AUTO: 0.13 K/UL (ref 0–0.51)
EOSINOPHIL NFR BLD: 1.4 % (ref 0–6.9)
ERYTHROCYTE [DISTWIDTH] IN BLOOD BY AUTOMATED COUNT: 47.6 FL (ref 35.9–50)
GFR SERPLBLD CREATININE-BSD FMLA CKD-EPI: 74 ML/MIN/1.73 M 2
GLOBULIN SER CALC-MCNC: 3 G/DL (ref 1.9–3.5)
GLUCOSE SERPL-MCNC: 116 MG/DL (ref 65–99)
HCT VFR BLD AUTO: 50 % (ref 37–47)
HGB BLD-MCNC: 15.7 G/DL (ref 12–16)
IMM GRANULOCYTES # BLD AUTO: 0.05 K/UL (ref 0–0.11)
IMM GRANULOCYTES NFR BLD AUTO: 0.6 % (ref 0–0.9)
LYMPHOCYTES # BLD AUTO: 2.76 K/UL (ref 1–4.8)
LYMPHOCYTES NFR BLD: 30.8 % (ref 22–41)
MCH RBC QN AUTO: 28.1 PG (ref 27–33)
MCHC RBC AUTO-ENTMCNC: 31.4 G/DL (ref 32.2–35.5)
MCV RBC AUTO: 89.6 FL (ref 81.4–97.8)
MONOCYTES # BLD AUTO: 0.85 K/UL (ref 0–0.85)
MONOCYTES NFR BLD AUTO: 9.5 % (ref 0–13.4)
NEUTROPHILS # BLD AUTO: 5.15 K/UL (ref 1.82–7.42)
NEUTROPHILS NFR BLD: 57.4 % (ref 44–72)
NRBC # BLD AUTO: 0 K/UL
NRBC BLD-RTO: 0 /100 WBC (ref 0–0.2)
NT-PROBNP SERPL IA-MCNC: 208 PG/ML (ref 0–125)
PLATELET # BLD AUTO: 182 K/UL (ref 164–446)
PMV BLD AUTO: 10.5 FL (ref 9–12.9)
POTASSIUM SERPL-SCNC: 3.9 MMOL/L (ref 3.6–5.5)
PROT SERPL-MCNC: 6.7 G/DL (ref 6–8.2)
RBC # BLD AUTO: 5.58 M/UL (ref 4.2–5.4)
SODIUM SERPL-SCNC: 143 MMOL/L (ref 135–145)
TROPONIN T SERPL-MCNC: 19 NG/L (ref 6–19)
WBC # BLD AUTO: 9 K/UL (ref 4.8–10.8)

## 2025-07-16 PROCEDURE — 85025 COMPLETE CBC W/AUTO DIFF WBC: CPT

## 2025-07-16 PROCEDURE — 700111 HCHG RX REV CODE 636 W/ 250 OVERRIDE (IP): Mod: JZ | Performed by: STUDENT IN AN ORGANIZED HEALTH CARE EDUCATION/TRAINING PROGRAM

## 2025-07-16 PROCEDURE — 99285 EMERGENCY DEPT VISIT HI MDM: CPT

## 2025-07-16 PROCEDURE — 80053 COMPREHEN METABOLIC PANEL: CPT

## 2025-07-16 PROCEDURE — 36415 COLL VENOUS BLD VENIPUNCTURE: CPT

## 2025-07-16 PROCEDURE — 93005 ELECTROCARDIOGRAM TRACING: CPT | Mod: TC | Performed by: STUDENT IN AN ORGANIZED HEALTH CARE EDUCATION/TRAINING PROGRAM

## 2025-07-16 PROCEDURE — 84484 ASSAY OF TROPONIN QUANT: CPT

## 2025-07-16 PROCEDURE — 83880 ASSAY OF NATRIURETIC PEPTIDE: CPT

## 2025-07-16 PROCEDURE — 71045 X-RAY EXAM CHEST 1 VIEW: CPT

## 2025-07-16 PROCEDURE — 96374 THER/PROPH/DIAG INJ IV PUSH: CPT

## 2025-07-16 RX ORDER — FUROSEMIDE 10 MG/ML
80 INJECTION INTRAMUSCULAR; INTRAVENOUS ONCE
Status: COMPLETED | OUTPATIENT
Start: 2025-07-16 | End: 2025-07-16

## 2025-07-16 RX ADMIN — FUROSEMIDE 80 MG: 10 INJECTION, SOLUTION INTRAVENOUS at 14:03

## 2025-07-16 ASSESSMENT — FIBROSIS 4 INDEX: FIB4 SCORE: 1.924500897298752549

## 2025-07-16 ASSESSMENT — LIFESTYLE VARIABLES: DO YOU DRINK ALCOHOL: NO

## 2025-07-16 NOTE — ED NOTES
Discharge instructions given to group home staff and legal guardian.  All questions answered.  Pt to follow-up with PCP as needed, return to ER if symptoms worsen as discussed.  All belongings with pt.  Pt transferred by CHERYL back to [facility].

## 2025-07-16 NOTE — ED NOTES
MRI checklist faxed. Pt x2 assist to [facility]. Pt voided and had large BM.  Pt assist back to [facility] and xray completed.  IV access obtained.  Blood drawn and sent to lab.    Call light in reach and bed alarm in place.

## 2025-07-16 NOTE — DISCHARGE PLANNING
MSW received notification from bedside RN. Pt needs to return to her group home. Bedside RN updated pt's [facility] and Group home that pt is returning.     MSW arranged [facility] transport for pt for 1520. Bedside RN updated.     [facility]   [address] [address]  [phone]

## 2025-07-16 NOTE — ED PROVIDER NOTES
ED Provider Note    CHIEF COMPLAINT  Chief Complaint   Patient presents with    Leg Swelling       EXTERNAL RECORDS REVIEWED  Inpatient Notes discharge summary on 6/3/2024 for patient with history of CVA, hypothyroidism, tobacco use, hyperlipidemia, dementia who presented with leg swelling. Patient was noted to be hypoxic, patient had elevated BNP, started on IV diuresis, ejection fraction on echo was 60% discharged home with Lasix 40 mg twice daily    HPI/ROS  LIMITATION TO HISTORY   Select: : None  OUTSIDE HISTORIAN(S):  EMS transported the patient for increased leg swelling this morning bilaterally    Gaye Antoine is a 85 y.o. female who presents with leg swelling.  Patient has history of leg swelling and heart failure and takes Lasix daily and is on 1 L of oxygen at baseline.  Patient is disoriented to date at baseline secondary to underlying dementia.  Patient denies chest pain, shortness of breath, fatigue, weakness or any other worsening symptoms.  Apparently staff at the patient's facility were concerned due to worsening swelling of the legs.    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    SURGICAL HISTORY   has a past surgical history that includes ankle fusion (1/8/2009); hip cannulated screw (6/15/2009); other (2008); appendectomy (age 7); cholecystectomy; parathyroid exploration (2/22/2012); rhytidectomy; and mammoplasty reduction (2006).    FAMILY HISTORY  Family History   Problem Relation Age of Onset    Cancer Mother          "breast    Lung Disease Mother         TB    Heart Disease Father         aneurysm    Genitourinary () Problems Sister         polycystic kidneys       SOCIAL HISTORY  Social History     Tobacco Use    Smoking status: Former     Current packs/day: 0.50     Average packs/day: 0.5 packs/day for 50.0 years (25.0 ttl pk-yrs)     Types: Cigarettes    Smokeless tobacco: Never    Tobacco comments:     Previous heavy 1 ppd smoker.    Vaping Use    Vaping status: Never Used   Substance and Sexual Activity    Alcohol use: Not Currently     Alcohol/week: 1.2 oz     Types: 2 Shots of liquor per week     Comment: rare    Drug use: No    Sexual activity: Not Currently     Comment:  2005       CURRENT MEDICATIONS  Home Medications    **Home medications have not yet been reviewed for this encounter**       Audit from Redirected Encounters    **Home medications have not yet been reviewed for this encounter**         ALLERGIES  Allergies[1]    PHYSICAL EXAM  VITAL SIGNS: BP (!) 153/93   Pulse 87   Temp 36.7 °C (98 °F) (Temporal)   Resp 20   Ht 1.727 m (5' 8\")   Wt 84.4 kg (186 lb)   LMP 03/01/2005   SpO2 92%   BMI 28.28 kg/m²    Vitals and nursing note reviewed.   Constitutional:       Comments: Patient is lying in bed supine, pleasant, conversant, speaking in complete sentences   HENT:      Head: Normocephalic and atraumatic.   Eyes:      Extraocular Movements: Extraocular movements intact.      Conjunctiva/sclera: Conjunctivae normal.      Pupils: Pupils are equal, round, and reactive to light.   Cardiovascular:      Pulses: Normal pulses.      Comments: HR 87  Pulmonary:      Effort: Pulmonary effort is normal. No respiratory distress.   Abdominal:      Comments: Abdomen is soft, non-tender, non-distended, non-rigid, no rebound, guarding, masses, no McBurney's point tenderness, no peritoneal signs, negative Rovsing sign, negative Hawley sign.  No CVA tenderness to palpation. Benign abdomen.   Musculoskeletal:    "   Trace peripheral edema     Cervical back: Normal range of motion. No rigidity.   Skin:     General: Skin is warm and dry.      Capillary Refill: Capillary refill takes less than 2 seconds.   Neurological:      Mental Status: Alert.       EKG/LABS  No acute ischemic changes  I have independently interpreted this EKG    RADIOLOGY/PROCEDURES   I have independently interpreted the diagnostic imaging associated with this visit and am waiting the final reading from the radiologist.   My preliminary interpretation is as follows: Lung markings grossly at baseline    Radiologist interpretation:  DX-CHEST-LIMITED (1 VIEW)   Final Result      1.  Mild interstitial edema and cardiomegaly.   2.  Small hiatal hernia.   3.  Atherosclerosis.   4.  Other stable chronic degenerative changes.          COURSE & MEDICAL DECISION MAKING    ASSESSMENT, COURSE AND PLAN  Care Narrative: CMP demonstrates no evidence of acute kidney injury, acute electrolyte abnormality, acute liver failure, CBC demonstrates no evidence of acute anemia or leukocytosis.  EKG without acute ischemic changes.  Patient without chest pain or anginal equivalent, ACS inconsistent with patient presentation at this time.  Troponin within normal limits.  Lasix given for peripheral edema.  Patient counseled on taking home medication and continue to do so and follow-up with PCP.  Chest x-ray without pneumonia, florid pulmonary edema.    Repeat physical exam benign.  I doubt any serious emergency process at this time.  Patient and/or family, friends given strict return precautions for worsening symptoms and care instructions. They have demonstrated understanding of discharge instructions through teach back mechanism. Advised PCP follow-up in 1-2 days.  Patient/family/friend expresses understanding and agrees to plan.    This dictation has been created using voice recognition software. I am continuously working with the software to minimize the number of voice recognition  errors and I have made every attempt to manually correct the errors within my dictation. However errors  related to this voice recognition software may still exist and should be interpreted within the appropriate context.     Electronically signed by: Vidal Menezes M.D., 7/16/2025 2:12 PM    HEART SCORE:  History - 0  EKG - 0  Age - 2  Risk Factors - 2  Initial Troponin - 0  Total - 4      DISPOSITION AND DISCUSSIONS  Escalation of care considered, and ultimately not performed:acute inpatient care management, however at this time, the patient is most appropriate for outpatient management    Decision tools and prescription drugs considered including, but not limited to: Antibiotics not indicated in the absence of bacterial infection.    FINAL DIAGNOSIS  1. Leg swelling         Electronically signed by: Vidal Menezes M.D., 7/16/2025 11:20 AM           [1]   Allergies  Allergen Reactions    Sulfa Drugs Unspecified     PER MAR

## 2025-07-16 NOTE — ED NOTES
Pt legal Guardian Mary updated on POC and plan for discharge back to group home.    Will contact  for assistance in getting pt back to group home.

## 2025-07-16 NOTE — ED NOTES
Pt BIB EMS from her Group Home, [facility].  Staff reported increase in leg swelling this AM, with swelling up to shin with purple coloration.  No discoloration or swelling noted at this time.  Pt denies any increase in swelling.  Pt takes lasix daily and on 1 liters of oxygen at baseline.  H/o dementia, pt A/Ox3, disoriented to date.  No c/o pain or SOB.  ERP to see.